# Patient Record
Sex: FEMALE | Race: WHITE | NOT HISPANIC OR LATINO | Employment: UNEMPLOYED | URBAN - METROPOLITAN AREA
[De-identification: names, ages, dates, MRNs, and addresses within clinical notes are randomized per-mention and may not be internally consistent; named-entity substitution may affect disease eponyms.]

---

## 2017-06-28 ENCOUNTER — HOSPITAL ENCOUNTER (OUTPATIENT)
Dept: MRI IMAGING | Facility: CLINIC | Age: 27
Discharge: HOME | End: 2017-06-28
Attending: PEDIATRICS | Admitting: PEDIATRICS
Payer: COMMERCIAL

## 2017-06-28 ENCOUNTER — OFFICE VISIT (OUTPATIENT)
Dept: PEDIATRIC HEMATOLOGY/ONCOLOGY | Facility: CLINIC | Age: 27
End: 2017-06-28
Attending: PEDIATRICS
Payer: COMMERCIAL

## 2017-06-28 VITALS
SYSTOLIC BLOOD PRESSURE: 107 MMHG | HEIGHT: 67 IN | TEMPERATURE: 97.5 F | DIASTOLIC BLOOD PRESSURE: 72 MMHG | WEIGHT: 186.95 LBS | RESPIRATION RATE: 20 BRPM | OXYGEN SATURATION: 100 % | BODY MASS INDEX: 29.34 KG/M2 | HEART RATE: 87 BPM

## 2017-06-28 DIAGNOSIS — D32.9 MENINGIOMA (H): Primary | ICD-10-CM

## 2017-06-28 DIAGNOSIS — D32.9 MENINGIOMA (H): ICD-10-CM

## 2017-06-28 PROCEDURE — 25000128 H RX IP 250 OP 636: Performed by: PEDIATRICS

## 2017-06-28 PROCEDURE — 99213 OFFICE O/P EST LOW 20 MIN: CPT | Mod: ZF

## 2017-06-28 PROCEDURE — 70553 MRI BRAIN STEM W/O & W/DYE: CPT

## 2017-06-28 PROCEDURE — A9585 GADOBUTROL INJECTION: HCPCS | Performed by: PEDIATRICS

## 2017-06-28 RX ORDER — GADOBUTROL 604.72 MG/ML
10 INJECTION INTRAVENOUS ONCE
Status: COMPLETED | OUTPATIENT
Start: 2017-06-28 | End: 2017-06-28

## 2017-06-28 RX ADMIN — GADOBUTROL 9 ML: 604.72 INJECTION INTRAVENOUS at 08:48

## 2017-06-28 ASSESSMENT — ENCOUNTER SYMPTOMS
HEMATOLOGIC/LYMPHATIC NEGATIVE: 1
MUSCULOSKELETAL NEGATIVE: 1
HEADACHES: 1
CARDIOVASCULAR NEGATIVE: 1
ALLERGIC/IMMUNOLOGIC NEGATIVE: 1
PSYCHIATRIC NEGATIVE: 1
CONSTITUTIONAL NEGATIVE: 1
ENDOCRINE NEGATIVE: 1
RESPIRATORY NEGATIVE: 1
DIARRHEA: 1
EYES NEGATIVE: 1

## 2017-06-28 ASSESSMENT — PAIN SCALES - GENERAL: PAINLEVEL: NO PAIN (1)

## 2017-06-28 NOTE — PROGRESS NOTES
Pt had MR brain performed this morning before Oncology appointment. After MR brain, shunt was interrogated with . Found to be 0.5 According to previous notes, patient's shunt valve should be at 2.0. Strata valve was reprogrammed to 2.0. Checked x3.     Kei Acosta, Neurosurgery, PGY-3  Please contact the neurosurgery resident on call with questions by dialing * * [phone], then entering [phone] prompted

## 2017-06-28 NOTE — LETTER
"  6/28/2017      RE: Mita Higgins  [address]   [facility] [address]          Pediatric Hematology/Oncology Clinic Note     HPI-  Mita Higgins is a 26 year old female with bilateral petrosphenoclival meningioma who presents to the clinic with mengioma for a follow up and review of MR brain results. She is s/p proton beam radiotherapy. Denies hip or other extremity pain. She is here for routine f/u - \"I'd rather be safe than sorry\".    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    History was obtained from Mita and medical record.       Allergies   Allergen Reactions     Nka [No Known Allergies]      Nkda [No Known Drug Allergies]      Seasonal Allergies        Current Outpatient Prescriptions   Medication     HYDROmorphone (DILAUDID) 2 MG tablet     acetaminophen (TYLENOL) 325 MG tablet     OMEPRAZOLE PO     cholecalciferol (VITAMIN D) 1000 UNIT tablet     atomoxetine (STRATTERA) 40 MG capsule     No current facility-administered medications for this visit.        Past Medical History:   Diagnosis Date     ADHD (attention deficit hyperactivity disorder)      Brain tumor (H)      Gastro-oesophageal reflux disease      Meningioma (H)      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)      S/P  shunt        Past Surgical History:   Procedure Laterality Date     ARTHROPLASTY HIP  7/30/2013    Procedure: ARTHROPLASTY HIP;  Left Total Hip Replacement ;  Surgeon: Oscar Peña MD;  Location:  OR     " "ARTHROPLASTY HIP Right 12/19/2014    Procedure: ARTHROPLASTY HIP;  Surgeon: Oscar Peña MD;  [facility]     BIOPSY  10/2010    brain tumor     ENT SURGERY      T & A     IMPLANT SHUNT VENTRICULOPERITONEAL  4/5/2012    Procedure:IMPLANT SHUNT VENTRICULOPERITONEAL; Left Ventriculo-Peritoneal Shunt Placement with Stealth Axiom; Surgeon:DWIGHT HOPKINS; Location:[facility]     OPTICAL TRACKING SYSTEM CRANIOTOMY, EXCISE TUMOR, COMBINED  4/12/2012    Procedure:COMBINED OPTICAL TRACKING SYSTEM CRANIOTOMY, EXCISE TUMOR; Stealth Assisted Suboccipital Craniotomy and Supracerabellar Infratentorial Approach to Brain Tumor; Surgeon:KEN VAUGHN; [facility]       No family history on file.    Review of Systems   Constitutional: Negative.    Eyes: Negative.    Respiratory: Negative.    Cardiovascular: Negative.    Gastrointestinal: Positive for diarrhea (With certain foods (greasy)).   Endocrine: Negative.    Genitourinary: Negative.    Musculoskeletal: Negative.    Skin: Negative.    Allergic/Immunologic: Negative.    Neurological: Positive for headaches.   Hematological: Negative.    Psychiatric/Behavioral: Negative.           /72 (BP Location: Right arm, Patient Position: Fowlers, Cuff Size: Adult Large)  Pulse 87  Temp 97.5  F (36.4  C) (Oral)  Resp 20  Ht 1.698 m (5' 6.85\")  Wt 84.8 kg (186 lb 15.2 oz)  SpO2 100%  BMI 29.41 kg/m2    Physical Exam   Constitutional: She is oriented to person, place, and time and well-developed, well-nourished, and in no distress.   HENT:   Head: Normocephalic.   Right Ear: External ear normal.   Left Ear: External ear normal.   Mouth/Throat: Oropharynx is clear and moist.   Eyes: Conjunctivae and EOM are normal. Pupils are equal, round, and reactive to light.   Neck: Normal range of motion. Neck supple.   Cardiovascular: Normal rate and regular rhythm.    Pulmonary/Chest: Breath sounds normal. She is in respiratory distress.   Abdominal: Soft. Bowel sounds " are normal. She exhibits no distension. There is no tenderness.   Musculoskeletal: Normal range of motion.   Neurological: She is alert and oriented to person, place, and time. She has normal reflexes. No cranial nerve deficit. Gait normal. GCS score is 15.   Skin: Skin is warm and dry. No erythema.   Psychiatric: Mood and affect normal.         Results for orders placed or performed in visit on 12/07/16   MRI Brain with & without gadolinium [TOW212]    Narrative     MR BRAIN W/O & W CONTRAST 12/7/2016 4:01 PM    History: Presence of cerebrospinal fluid drainage device, Benign  neoplasm of meninges, unspecified.    Comparison: 6/28/2016.    Technique: Multiplanar T1-weighted, axial FLAIR, and susceptibility  images were obtained without intravenous contrast. Following  intravenous gadolinium-based contrast administration, axial  T2-weighted, diffusion, and T1-weighted images (in multiple planes)  were obtained.    Contrast: 10 cc Gadavist IV    Findings:  Left frontal approach ventriculostomy catheter with tip of the foramen  of Yepez, unchanged. Susceptibility effect associated with the valve  reservoir. No change in size and configuration of the nonenlarged  ventricles. Extra-axial enhancing dural based mass extending along the  dorsal aspect of the clivus and sphenoid bone, also extending into  Meckel's cave bilaterally and surrounding the lateral and posterior  margins of both cavernous sinuses with contiguous dural thickening and  enhancement extending into the bilateral IACs, left greater than  right. There is also dural thickening and enhancement extending into  the sella and along the tentorium bilaterally. The mass nearly  completely surrounds the basilar artery with maintained flow void on  T2-weighted images. The flow voids of both cavernous internal carotid  arteries are also maintained. Associated mass effect on the lower  midbrain and upper jessica, unchanged. No change in size of this mass  since  6/28/2016. The mass is also similar in size when compared to  3/13/2015. There is less avid enhancement of the right parasagittal  portion of the tumor in the prepontine cistern, unchanged. There is  additional contiguous enhancing extra-axial mass with dural thickening  along the anterior and lateral aspects of the left middle cranial  fossa, also extending into the inferolateral aspect of the left  anterior cranial fossa. Unchanged associated local mass effect. No new  abnormal enhancement.    No parenchymal diffusion restriction. Scattered punctate foci of  susceptibility effect along the tentorium, unchanged. Mild paranasal  sinus mucosal thickening.      Impression    Impression:  1. No change in extent of meningioma, the bulk of which is in the  prepontine cistern.  2. Unchanged meningioma over the left temporoparietal region.  3. Unchanged left frontal approach ventriculostomy catheter with  stable size and configuration of the ventricles. No hydrocephalus.    JUAN SMITH MD       Comparison MRI today is stable.      1. History of meningioma   2. MR Brain - stable    Impression:  1. No progression      Plan:  1. RTC 2 years with MRI  2. Shunt reset today per neurosurgery    Time spent with patient 25 minutes.    This document serves as a record of the services and decisions personally performed and made by Pilo Barrett MD. It was created on his behalf by Carol Ann Luke, a trained medical scribe. The creation of this document is based on the provider's statements to the medical scribe.    The documentation recorded by the scribe accurately reflects the services I personally performed and the decisions made by me.      Pilo Barrett    CC  Patient Care Team:  Mitchel Aldridge as PCP - General  Susan Short MSW as  (Pediatric Hematology/Oncology)  Dread Smith MD as MD (Neurosurgery)  Oscar Peña MD as MD (Orthopedics)      Copy to patient  ANA CHASE  TONI  [address] [place] [address]   [facility] [address]

## 2017-06-28 NOTE — PROGRESS NOTES
"   Pediatric Hematology/Oncology Clinic Note     HPI-  Mita Higgins is a 26 year old female with bilateral petrosphenoclival meningioma who presents to the clinic with mengioma for a follow up and review of MR brain results. She is s/p proton beam radiotherapy. Denies hip or other extremity pain. She is here for routine f/u - \"I'd rather be safe than sorry\".    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    History was obtained from Mita and medical record.       Allergies   Allergen Reactions     Nka [No Known Allergies]      Nkda [No Known Drug Allergies]      Seasonal Allergies        Current Outpatient Prescriptions   Medication     HYDROmorphone (DILAUDID) 2 MG tablet     acetaminophen (TYLENOL) 325 MG tablet     OMEPRAZOLE PO     cholecalciferol (VITAMIN D) 1000 UNIT tablet     atomoxetine (STRATTERA) 40 MG capsule     No current facility-administered medications for this visit.        Past Medical History:   Diagnosis Date     ADHD (attention deficit hyperactivity disorder)      Brain tumor (H)      Gastro-oesophageal reflux disease      Meningioma (H)      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)      S/P  shunt        Past Surgical History:   Procedure Laterality Date     ARTHROPLASTY HIP  7/30/2013    Procedure: ARTHROPLASTY HIP;  Left Total Hip Replacement ;  Surgeon: Oscar Peña MD;  [facility]     ARTHROPLASTY HIP Right 12/19/2014    Procedure: ARTHROPLASTY HIP;  Surgeon: Oscar Peña, " "MD;  [facility]     BIOPSY  10/2010    brain tumor     ENT SURGERY      T & A     IMPLANT SHUNT VENTRICULOPERITONEAL  4/5/2012    Procedure:IMPLANT SHUNT VENTRICULOPERITONEAL; Left Ventriculo-Peritoneal Shunt Placement with Stealth Axiom; Surgeon:DWIGHT HOPKINS; [facility]     OPTICAL TRACKING SYSTEM CRANIOTOMY, EXCISE TUMOR, COMBINED  4/12/2012    Procedure:COMBINED OPTICAL TRACKING SYSTEM CRANIOTOMY, EXCISE TUMOR; Stealth Assisted Suboccipital Craniotomy and Supracerabellar Infratentorial Approach to Brain Tumor; Surgeon:KEN VAUGHN; Location[facility]       No family history on file.    Review of Systems   Constitutional: Negative.    Eyes: Negative.    Respiratory: Negative.    Cardiovascular: Negative.    Gastrointestinal: Positive for diarrhea (With certain foods (greasy)).   Endocrine: Negative.    Genitourinary: Negative.    Musculoskeletal: Negative.    Skin: Negative.    Allergic/Immunologic: Negative.    Neurological: Positive for headaches.   Hematological: Negative.    Psychiatric/Behavioral: Negative.           /72 (BP Location: Right arm, Patient Position: Fowlers, Cuff Size: Adult Large)  Pulse 87  Temp 97.5  F (36.4  C) (Oral)  Resp 20  Ht 1.698 m (5' 6.85\")  Wt 84.8 kg (186 lb 15.2 oz)  SpO2 100%  BMI 29.41 kg/m2    Physical Exam   Constitutional: She is oriented to person, place, and time and well-developed, well-nourished, and in no distress.   HENT:   Head: Normocephalic.   Right Ear: External ear normal.   Left Ear: External ear normal.   Mouth/Throat: Oropharynx is clear and moist.   Eyes: Conjunctivae and EOM are normal. Pupils are equal, round, and reactive to light.   Neck: Normal range of motion. Neck supple.   Cardiovascular: Normal rate and regular rhythm.    Pulmonary/Chest: Breath sounds normal. She is in respiratory distress.   Abdominal: Soft. Bowel sounds are normal. She exhibits no distension. There is no tenderness.   Musculoskeletal: Normal " range of motion.   Neurological: She is alert and oriented to person, place, and time. She has normal reflexes. No cranial nerve deficit. Gait normal. GCS score is 15.   Skin: Skin is warm and dry. No erythema.   Psychiatric: Mood and affect normal.         Results for orders placed or performed in visit on 12/07/16   MRI Brain with & without gadolinium [INE008]    Narrative     MR BRAIN W/O & W CONTRAST 12/7/2016 4:01 PM    History: Presence of cerebrospinal fluid drainage device, Benign  neoplasm of meninges, unspecified.    Comparison: 6/28/2016.    Technique: Multiplanar T1-weighted, axial FLAIR, and susceptibility  images were obtained without intravenous contrast. Following  intravenous gadolinium-based contrast administration, axial  T2-weighted, diffusion, and T1-weighted images (in multiple planes)  were obtained.    Contrast: 10 cc Gadavist IV    Findings:  Left frontal approach ventriculostomy catheter with tip of the foramen  of Yepez, unchanged. Susceptibility effect associated with the valve  reservoir. No change in size and configuration of the nonenlarged  ventricles. Extra-axial enhancing dural based mass extending along the  dorsal aspect of the clivus and sphenoid bone, also extending into  Meckel's cave bilaterally and surrounding the lateral and posterior  margins of both cavernous sinuses with contiguous dural thickening and  enhancement extending into the bilateral IACs, left greater than  right. There is also dural thickening and enhancement extending into  the sella and along the tentorium bilaterally. The mass nearly  completely surrounds the basilar artery with maintained flow void on  T2-weighted images. The flow voids of both cavernous internal carotid  arteries are also maintained. Associated mass effect on the lower  midbrain and upper jessica, unchanged. No change in size of this mass  since 6/28/2016. The mass is also similar in size when compared to  3/13/2015. There is less avid  enhancement of the right parasagittal  portion of the tumor in the prepontine cistern, unchanged. There is  additional contiguous enhancing extra-axial mass with dural thickening  along the anterior and lateral aspects of the left middle cranial  fossa, also extending into the inferolateral aspect of the left  anterior cranial fossa. Unchanged associated local mass effect. No new  abnormal enhancement.    No parenchymal diffusion restriction. Scattered punctate foci of  susceptibility effect along the tentorium, unchanged. Mild paranasal  sinus mucosal thickening.      Impression    Impression:  1. No change in extent of meningioma, the bulk of which is in the  prepontine cistern.  2. Unchanged meningioma over the left temporoparietal region.  3. Unchanged left frontal approach ventriculostomy catheter with  stable size and configuration of the ventricles. No hydrocephalus.    JUAN SMITH MD       Comparison MRI today is stable.      1. History of meningioma   2. MR Brain - stable    Impression:  1. No progression      Plan:  1. RTC 2 years with MRI  2. Shunt reset today per neurosurgery    Time spent with patient 25 minutes.    This document serves as a record of the services and decisions personally performed and made by Pilo Barrett MD. It was created on his behalf by Carol Ann Luke, a trained medical scribe. The creation of this document is based on the provider's statements to the medical scribe.    The documentation recorded by the scribe accurately reflects the services I personally performed and the decisions made by me.      Pilo Barrett      Patient Care Team:  Mitchel Aldridge as PCP - General  Susan Short MSW as  (Pediatric Hematology/Oncology)  Dread Smith MD as MD (Neurosurgery)  Oscar Peña MD as MD (Orthopedics)  MITCHEL ALDRIDGE    Copy to patient  ANA STONE [address]  [place] [address]

## 2017-06-28 NOTE — NURSING NOTE
"Chief Complaint   Patient presents with     RECHECK     Patient here today for follow up with Meningioma (H)     /72 (BP Location: Right arm, Patient Position: Fowlers, Cuff Size: Adult Large)  Pulse 87  Temp 97.5  F (36.4  C) (Oral)  Resp 20  Ht 1.698 m (5' 6.85\")  Wt 84.8 kg (186 lb 15.2 oz)  SpO2 100%  BMI 29.41 kg/m2  Carmel Meza M.A  June 28, 2017    "

## 2017-06-28 NOTE — MR AVS SNAPSHOT
After Visit Summary   6/28/2017    Mita Higgins    MRN: [id number]           Patient Information     Date Of Birth          1990        Visit Information        Provider Department      6/28/2017 10:00 AM Pilo Barrett MD Peds Hematology Oncology        Today's Diagnoses     Meningioma (H)    -  1          [facility], 9th floor  [address] [place] [address]  Phone: [phone]  Clinic Hours:   Monday-Friday:   7 am to 5:00 pm   closed weekends and major  holidays     If your fever is 100.5  or greater,   call the clinic during business hours.   After hours call [phone] and ask for the pediatric hematology / oncology physician to be paged for you.               Follow-ups after your visit        Follow-up notes from your care team     Return for CNS Tumor follow-up, Scan Results.      Who to contact     Please call your clinic at [phone] to:    Ask questions about your health    Make or cancel appointments    Discuss your medicines    Learn about your test results    Speak to your doctor   If you have compliments or concerns about an experience at your clinic, or if you wish to file a complaint, please contact [facility] Patient Relations at [phone] or email us at [email or web address]         Additional Information About Your Visit        MyChart Information     iQiyi is an electronic gateway that provides easy, online access to your medical records. With iQiyi, you can request a clinic appointment, read your test results, renew a prescription or communicate with your care team.     To sign up for ByAllAccountst visit the website at [email or web address]   You will be asked to enter the access code listed below, as well as some personal information. Please follow the directions to create your username and password.     Your access code is: [email or web address]  Expires:  "2017  2:41 PM     Your access code will  in 90 days. If you need help or a new code, please contact your [facility] or call [phone] for assistance.        Care EveryWhere ID     This is your Care EveryWhere ID. This could be used by other organizations to access your [facility] medical records  [id number]        Your Vitals Were     Pulse Temperature Respirations Height Pulse Oximetry BMI (Body Mass Index)    87 97.5  F (36.4  C) (Oral) 20 1.698 m (5' 6.85\") 100% 29.41 kg/m2       Blood Pressure from Last 3 Encounters:   17 107/72   16 118/66   03/13/15 107/78    Weight from Last 3 Encounters:   17 84.8 kg (186 lb 15.2 oz)   16 92.2 kg (203 lb 3.2 oz)   03/13/15 87.3 kg (192 lb 7.4 oz)              Today, you had the following     No orders found for display       Primary Care Provider Office Phone # Fax #    Mitchel Aldridge [phone]       [address] [address]        Equal Access to Services     SAMUEL BROWN AH: Hadii suzan ku hadasho Soomaali, waaxda luqadaha, qaybta kaalmada adeegyada, barrie leonin hayramon mcgraw. So [facility] [phone].    ATENCIÓN: Si habla español, tiene a floyd disposición servicios gratuitos de asistencia lingüística. Llame al [phone].    We comply with applicable federal civil rights laws and [place] laws. We do not discriminate on the basis of race, color, national origin, age, disability sex, sexual orientation or gender identity.            Thank you!     Thank you for choosing PEDS HEMATOLOGY ONCOLOGY  for your care. Our goal is always to provide you with excellent care. Hearing back from our patients is one way we can continue to improve our services. Please take a few minutes to complete the written survey that you may receive in the mail after your visit with us. Thank you!             Your Updated Medication List - Protect others " around you: Learn how to safely use, store and throw away your medicines at www.disposemymeds.org.          This list is accurate as of: 6/28/17  2:41 PM.  Always use your most recent med list.                   Brand Name Dispense Instructions for use Diagnosis    acetaminophen 325 MG tablet    TYLENOL    100 tablet    Take 2 tablets (650 mg) by mouth every 6 hours    S/P total hip arthroplasty       cholecalciferol 1000 UNIT tablet    vitamin D    30 tablet    Take 2 tablets by mouth daily.    Meningioma (H)       ranitidine 150 MG tablet    ZANTAC    60 tablet    Take by mouth 2 times daily    Meningioma (H)       STRATTERA 40 MG capsule   Generic drug:  atomoxetine      Take 1 capsule by mouth daily Hold DOS 12-19

## 2017-06-28 NOTE — LETTER
"  6/28/2017      RE: Mita Higgins  [address]   [facility] [address]          Pediatric Hematology/Oncology Clinic Note     HPI-  Mita Higgins is a 26 year old female with bilateral petrosphenoclival meningioma who presents to the clinic with mengioma for a follow up and review of MR brain results. She is s/p proton beam radiotherapy. Denies hip or other extremity pain. She is here for routine f/u - \"I'd rather be safe than sorry\".    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    History was obtained from Mita and medical record.       Allergies   Allergen Reactions     Nka [No Known Allergies]      Nkda [No Known Drug Allergies]      Seasonal Allergies        Current Outpatient Prescriptions   Medication     HYDROmorphone (DILAUDID) 2 MG tablet     acetaminophen (TYLENOL) 325 MG tablet     OMEPRAZOLE PO     cholecalciferol (VITAMIN D) 1000 UNIT tablet     atomoxetine (STRATTERA) 40 MG capsule     No current facility-administered medications for this visit.        Past Medical History:   Diagnosis Date     ADHD (attention deficit hyperactivity disorder)      Brain tumor (H)      Gastro-oesophageal reflux disease      Meningioma (H)      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)      S/P  shunt        Past Surgical History:   Procedure Laterality Date     ARTHROPLASTY HIP  7/30/2013    Procedure: ARTHROPLASTY HIP;  Left Total Hip Replacement ;  Surgeon: Oscar Peña MD;  Location:  OR     " "ARTHROPLASTY HIP Right 12/19/2014    Procedure: ARTHROPLASTY HIP;  Surgeon: Oscar Peña MD;  Location: [facility]     BIOPSY  10/2010    brain tumor     ENT SURGERY      T & A     IMPLANT SHUNT VENTRICULOPERITONEAL  4/5/2012    Procedure:IMPLANT SHUNT VENTRICULOPERITONEAL; Left Ventriculo-Peritoneal Shunt Placement with Stealth Axiom; Surgeon:DWIGHT HOPKINS; Location:[facility]     OPTICAL TRACKING SYSTEM CRANIOTOMY, EXCISE TUMOR, COMBINED  4/12/2012    Procedure:COMBINED OPTICAL TRACKING SYSTEM CRANIOTOMY, EXCISE TUMOR; Stealth Assisted Suboccipital Craniotomy and Supracerabellar Infratentorial Approach to Brain Tumor; Surgeon:KEN VAUGHN; Location:[facility]       No family history on file.    Review of Systems   Constitutional: Negative.    Eyes: Negative.    Respiratory: Negative.    Cardiovascular: Negative.    Gastrointestinal: Positive for diarrhea (With certain foods (greasy)).   Endocrine: Negative.    Genitourinary: Negative.    Musculoskeletal: Negative.    Skin: Negative.    Allergic/Immunologic: Negative.    Neurological: Positive for headaches.   Hematological: Negative.    Psychiatric/Behavioral: Negative.           /72 (BP Location: Right arm, Patient Position: Fowlers, Cuff Size: Adult Large)  Pulse 87  Temp 97.5  F (36.4  C) (Oral)  Resp 20  Ht 1.698 m (5' 6.85\")  Wt 84.8 kg (186 lb 15.2 oz)  SpO2 100%  BMI 29.41 kg/m2    Physical Exam   Constitutional: She is oriented to person, place, and time and well-developed, well-nourished, and in no distress.   HENT:   Head: Normocephalic.   Right Ear: External ear normal.   Left Ear: External ear normal.   Mouth/Throat: Oropharynx is clear and moist.   Eyes: Conjunctivae and EOM are normal. Pupils are equal, round, and reactive to light.   Neck: Normal range of motion. Neck supple.   Cardiovascular: Normal rate and regular rhythm.    Pulmonary/Chest: Breath sounds normal. She is in respiratory distress.   Abdominal: Soft. Bowel sounds " are normal. She exhibits no distension. There is no tenderness.   Musculoskeletal: Normal range of motion.   Neurological: She is alert and oriented to person, place, and time. She has normal reflexes. No cranial nerve deficit. Gait normal. GCS score is 15.   Skin: Skin is warm and dry. No erythema.   Psychiatric: Mood and affect normal.         Results for orders placed or performed in visit on 12/07/16   MRI Brain with & without gadolinium [[id number]]    Narrative     MR BRAIN W/O & W CONTRAST 12/7/2016 4:01 PM    History: Presence of cerebrospinal fluid drainage device, Benign  neoplasm of meninges, unspecified.    Comparison: 6/28/2016.    Technique: Multiplanar T1-weighted, axial FLAIR, and susceptibility  images were obtained without intravenous contrast. Following  intravenous gadolinium-based contrast administration, axial  T2-weighted, diffusion, and T1-weighted images (in multiple planes)  were obtained.    Contrast: 10 cc Gadavist IV    Findings:  Left frontal approach ventriculostomy catheter with tip of the foramen  of Yepez, unchanged. Susceptibility effect associated with the valve  reservoir. No change in size and configuration of the nonenlarged  ventricles. Extra-axial enhancing dural based mass extending along the  dorsal aspect of the clivus and sphenoid bone, also extending into  Meckel's cave bilaterally and surrounding the lateral and posterior  margins of both cavernous sinuses with contiguous dural thickening and  enhancement extending into the bilateral IACs, left greater than  right. There is also dural thickening and enhancement extending into  the sella and along the tentorium bilaterally. The mass nearly  completely surrounds the basilar artery with maintained flow void on  T2-weighted images. The flow voids of both cavernous internal carotid  arteries are also maintained. Associated mass effect on the lower  midbrain and upper jessica, unchanged. No change in size of this mass  since  6/28/2016. The mass is also similar in size when compared to  3/13/2015. There is less avid enhancement of the right parasagittal  portion of the tumor in the prepontine cistern, unchanged. There is  additional contiguous enhancing extra-axial mass with dural thickening  along the anterior and lateral aspects of the left middle cranial  fossa, also extending into the inferolateral aspect of the left  anterior cranial fossa. Unchanged associated local mass effect. No new  abnormal enhancement.    No parenchymal diffusion restriction. Scattered punctate foci of  susceptibility effect along the tentorium, unchanged. Mild paranasal  sinus mucosal thickening.      Impression    Impression:  1. No change in extent of meningioma, the bulk of which is in the  prepontine cistern.  2. Unchanged meningioma over the left temporoparietal region.  3. Unchanged left frontal approach ventriculostomy catheter with  stable size and configuration of the ventricles. No hydrocephalus.    JUAN SMITH MD       Comparison MRI today is stable.      1. History of meningioma   2. MR Brain - stable    Impression:  1. No progression      Plan:  1. RTC 2 years with MRI  2. Shunt reset today per neurosurgery    Time spent with patient 25 minutes.    This document serves as a record of the services and decisions personally performed and made by Pilo Barrett MD. It was created on his behalf by Carol Ann Luke, a trained medical scribe. The creation of this document is based on the provider's statements to the medical scribe.    The documentation recorded by the scribe accurately reflects the services I personally performed and the decisions made by me.      Pilo Barrett      Patient Care Team:  Mitchel Aldridge as PCP - General  Susan Short MSW as  (Pediatric Hematology/Oncology)  Dread Smith MD as MD (Neurosurgery)  Oscar Peña MD as MD (Orthopedics)  MITCHEL ALDRIDGE    Copy to  patient  ANA RICHARDS[address]   [facility] [address]      Pilo Barrett MD

## 2018-11-19 DIAGNOSIS — D32.9 MENINGIOMA (H): Primary | ICD-10-CM

## 2018-12-04 ENCOUNTER — HOSPITAL ENCOUNTER (OUTPATIENT)
Dept: MRI IMAGING | Facility: CLINIC | Age: 28
Discharge: HOME | End: 2018-12-04
Attending: PEDIATRICS | Admitting: PEDIATRICS
Payer: COMMERCIAL

## 2018-12-04 ENCOUNTER — OFFICE VISIT (OUTPATIENT)
Dept: PEDIATRIC HEMATOLOGY/ONCOLOGY | Facility: CLINIC | Age: 28
End: 2018-12-04
Attending: NURSE PRACTITIONER
Payer: COMMERCIAL

## 2018-12-04 VITALS
HEIGHT: 67 IN | BODY MASS INDEX: 30.62 KG/M2 | HEART RATE: 79 BPM | RESPIRATION RATE: 24 BRPM | TEMPERATURE: 98 F | OXYGEN SATURATION: 100 % | WEIGHT: 195.11 LBS | SYSTOLIC BLOOD PRESSURE: 110 MMHG | DIASTOLIC BLOOD PRESSURE: 94 MMHG

## 2018-12-04 DIAGNOSIS — D32.9 MENINGIOMA (H): Primary | ICD-10-CM

## 2018-12-04 DIAGNOSIS — G44.209 TENSION HEADACHE: ICD-10-CM

## 2018-12-04 DIAGNOSIS — D32.9 MENINGIOMA (H): ICD-10-CM

## 2018-12-04 PROCEDURE — A9585 GADOBUTROL INJECTION: HCPCS | Performed by: PEDIATRICS

## 2018-12-04 PROCEDURE — G0463 HOSPITAL OUTPT CLINIC VISIT: HCPCS | Mod: ZF,25

## 2018-12-04 PROCEDURE — 25500064 ZZH RX 255 OP 636: Performed by: PEDIATRICS

## 2018-12-04 PROCEDURE — 70553 MRI BRAIN STEM W/O & W/DYE: CPT

## 2018-12-04 RX ORDER — MEDROXYPROGESTERONE ACETATE 10 MG
10 TABLET ORAL
Status: ON HOLD | COMMUNITY
Start: 2018-11-16 | End: 2021-09-11

## 2018-12-04 RX ORDER — GADOBUTROL 604.72 MG/ML
7.5 INJECTION INTRAVENOUS ONCE
Status: COMPLETED | OUTPATIENT
Start: 2018-12-04 | End: 2018-12-04

## 2018-12-04 RX ADMIN — GADOBUTROL 7.5 ML: 604.72 INJECTION INTRAVENOUS at 12:08

## 2018-12-04 ASSESSMENT — PAIN SCALES - GENERAL: PAINLEVEL: NO PAIN (0)

## 2018-12-04 ASSESSMENT — ENCOUNTER SYMPTOMS
RESPIRATORY NEGATIVE: 1
DIARRHEA: 1
MUSCULOSKELETAL NEGATIVE: 1
ALLERGIC/IMMUNOLOGIC NEGATIVE: 1
HEADACHES: 1
HEMATOLOGIC/LYMPHATIC NEGATIVE: 1
ENDOCRINE NEGATIVE: 1
EYES NEGATIVE: 1
CONSTITUTIONAL NEGATIVE: 1
PSYCHIATRIC NEGATIVE: 1
CARDIOVASCULAR NEGATIVE: 1

## 2018-12-04 NOTE — NURSING NOTE
"Chief Complaint   Patient presents with     RECHECK     Patient is here today for Meningioma follow up     BP (!) 110/94 (BP Location: Right arm, Patient Position: Fowlers, Cuff Size: Adult Large)  Pulse 79  Temp 98  F (36.7  C) (Oral)  Resp 24  Ht 1.698 m (5' 6.85\")  Wt 88.5 kg (195 lb 1.7 oz)  SpO2 100%  BMI 30.7 kg/m2    Jannette Miller LPN  December 4, 2018  "

## 2018-12-04 NOTE — LETTER
12/4/2018      RE: Mita Higgins  [address]  [facility] [address]          Pediatric Hematology/Oncology Clinic Note     CC:  Mita Higgins is a 28 year old female with bilateral petrosphenoclival meningioma who presents to the clinic with mengioma for a review of MR brain results which was ordered for difficult symptomatology.    HPI:. Denies hip or other extremity pain.She spoke with Dr. Barrett regarding increasing headaches which started about 3 weeks ago. They began to happen intermittently since her travel in late August but recently are more continuous. They start on the right side and then cross toward the shunt.   No nausea or vomiting with the headaches.  Increased headache issues when she is hot.  Voiding okay.  The last three days she has had diarrhea.  No fevers.  She started her period on the 17th for the first time in years.  She was started on Provera on the 15th.  Still is changing her menstrual pads today.        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    History was obtained from Mita and medical record.       Allergies   Allergen Reactions     Nka [No Known Allergies]      Nkda [No Known Drug Allergies]      Seasonal Allergies        Current Outpatient Prescriptions   Medication     acetaminophen (TYLENOL) 325 MG tablet     atomoxetine (STRATTERA) 40 MG capsule     medroxyPROGESTERone (PROVERA) 10 MG tablet     ranitidine (ZANTAC) 150 MG tablet     cholecalciferol (VITAMIN D) 1000 UNIT tablet     No current facility-administered medications for this visit.        Past Medical History:   Diagnosis Date     ADHD (attention deficit hyperactivity  "disorder)      Brain tumor (H)      Gastro-oesophageal reflux disease      Meningioma (H)      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)      S/P  shunt        Past Surgical History:   Procedure Laterality Date     ARTHROPLASTY HIP  7/30/2013    Procedure: ARTHROPLASTY HIP;  Left Total Hip Replacement ;  Surgeon: Oscar Peña MD;  [facility]     ARTHROPLASTY HIP Right 12/19/2014    Procedure: ARTHROPLASTY HIP;  Surgeon: Oscar Peña MD;  [facility]: [facility]     BIOPSY  10/2010    brain tumor     ENT SURGERY      T & A     IMPLANT SHUNT VENTRICULOPERITONEAL  4/5/2012    Procedure:IMPLANT SHUNT VENTRICULOPERITONEAL; Left Ventriculo-Peritoneal Shunt Placement with Stealth Axiom; Surgeon:DWIGHT HOPKINS; Location[facility]     OPTICAL TRACKING SYSTEM CRANIOTOMY, EXCISE TUMOR, COMBINED  4/12/2012    Procedure:COMBINED OPTICAL TRACKING SYSTEM CRANIOTOMY, EXCISE TUMOR; Stealth Assisted Suboccipital Craniotomy and Supracerabellar Infratentorial Approach to Brain Tumor; Surgeon:KEN VAUGHN; Location:[facility]   She is s/p proton beam radiotherapy.     No family history on file.    Review of Systems   Constitutional: Negative.    Eyes: Negative.    Respiratory: Negative.    Cardiovascular: Negative.    Gastrointestinal: Positive for diarrhea (With certain foods (greasy).  She has also had some the last several days. ).   Endocrine: Negative.    Genitourinary: Negative.    Musculoskeletal: Negative.    Skin: Negative.    Allergic/Immunologic: Negative.    Neurological: Positive for headaches.   Hematological: Negative.    Psychiatric/Behavioral: Negative.         BP (!) 110/94 (BP Location: Right arm, Patient Position: Fowlers, Cuff Size: Adult Large)  Pulse 79  Temp 98  F (36.7  C) (Oral)  Resp 24  Ht 1.698 m (5' 6.85\")  Wt 88.5 kg (195 lb 1.7 oz)  SpO2 100%  BMI 30.7 kg/m2    Physical Exam   Constitutional: She is oriented to person, place, and time and well-developed, well-nourished, and in no distress. "   HENT:   Head: Normocephalic.   Right Ear: External ear normal.   Left Ear: External ear normal.   Mouth/Throat: Oropharynx is clear and moist.   Eyes: Conjunctivae and EOM are normal. Pupils are equal, round, and reactive to light.   Neck: Normal range of motion. Neck supple.   Cardiovascular: Normal rate and regular rhythm.    Pulmonary/Chest: Breath sounds normal.   Abdominal: Soft. Bowel sounds are normal. She exhibits no distension. There is no tenderness.   Musculoskeletal: Normal range of motion.   Neurological: She is alert and oriented to person, place, and time. She has normal reflexes. No cranial nerve deficit. Gait normal. GCS score is 15.   Skin: Skin is warm and dry. No erythema.   Psychiatric: Mood and affect normal.       Imaging: MRI completed today    Impression:  1. History of meningioma   2. MR Brain - prelim results are that it is stable.  3. Headache - that resolved with shunt reprogramimg.     Plan:  1. Discussed preliminary results with radiology and they thought MRI was stable.  We will review the scans in tumor conference on Wednesday and will let them know when results have been finalized.    2. Shunt reset today per neurosurgery - Interestingly the headache improved with reprogramming.  It is possible that security measures or the flight itself could have spontaneously adjusted the shunt.    Addendum:  Results for orders placed or performed during the hospital encounter of 12/04/18   MR Brain w/o & w Contrast    Narrative     MR BRAIN W/O & W CONTRAST 12/4/2018 12:39 PM    Provided History: Pt has a  shunt. Strata Valve; Meningioma (H).  ICD-10: Meningioma (H)    Comparison: 6/28/2017, 12/7/2016.    Technique: Multiplanar T1-weighted, axial FLAIR, and susceptibility  images were obtained without intravenous contrast. Following  intravenous gadolinium-based contrast administration, axial  T2-weighted, diffusion, and T1-weighted images (in multiple planes)  were obtained.    Contrast:  7.5 mL of Gadavist    Findings:  Left frontal approach ventriculostomy catheter with tip at the level  of the left foramen of Yepez. Significant metal artifact from the  shunt reservoir over the left frontal convexity.    No substantial change in the dural based enhancing mass lesion  centered about the prepontine cistern with partial encasement of the  basilar artery. This lesion extends bilaterally into Meckel's cave,  the proximal aspects of the internal auditory canals, and the sella  turcica.    No substantial change in the dural based enhancing mass lesion  situated between the left frontal lobe, temporal lobe, and anterior  left temporal bone.    No substantial change in the dural based enhancing lesion overlying  the left frontal convexity.    Further increase in size of a right frontal convexity dural based  enhancing mass lesion measuring 8 x 11 mm (series 101 image 122)  previously 5 x 8 mm 6/28/2017 and 3 x 5 mm 6/28/2016.    There is no midline shift, or evidence of intracranial hemorrhage. The  ventricles are proportionate to the cerebral sulci.  No definite abnormality of the skull marrow signal is noted. The major  vascular intracranial flow-voids are present. The visualized portions  of paranasal sinuses, and mastoid air cells are relatively clear. The  orbits are grossly unremarkable.      Impression    Impression:  1. Further increase in size of the small right frontal convexity  meningioma measuring 8 x 11 mm (series 101 image 122) previously 5 x 8  mm 6/28/2017 and 3 x 5 mm 6/28/2016.  2. Unchanged meningiomas centered in the prepontine cistern, left  frontal cerebral convexity, and left temporo frontal area dating back  to 6/28/2016.  3. Stable appearance of the left frontal approach ventriculostomy  catheter.  4. T1 bright signal in the bilateral globus pallidus, question  gadolinium deposition.    I have personally reviewed the examination and initial interpretation  and I agree with the  findings.    MIRTHA WATSON MD     Discussed final MRI results with My and her mother.  It is concerning that she has multiple meningiomas and there is mild progression.  We will recheck MRI in 6 months and discuss testing for meningiomatosis. We will arrange visit with the genetic counselor.  This was explained and discussed with Mita and her mother.     LOUISE Zhang CNP

## 2018-12-04 NOTE — PROGRESS NOTES
Pediatric Hematology/Oncology Clinic Note     CC:  Mita Higgins is a 28 year old female with bilateral petrosphenoclival meningioma who presents to the clinic with mengioma for a review of MR brain results which was ordered for difficult symptomatology.    HPI:. Denies hip or other extremity pain.She spoke with Dr. Barrett regarding increasing headaches which started about 3 weeks ago. They began to happen intermittently since her travel in late August but recently are more continuous. They start on the right side and then cross toward the shunt.   No nausea or vomiting with the headaches.  Increased headache issues when she is hot.  Voiding okay.  The last three days she has had diarrhea.  No fevers.  She started her period on the 17th for the first time in years.  She was started on Provera on the 15th.  Still is changing her menstrual pads today.        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    History was obtained from Mita and medical record.       Allergies   Allergen Reactions     Nka [No Known Allergies]      Nkda [No Known Drug Allergies]      Seasonal Allergies        Current Outpatient Prescriptions   Medication     acetaminophen (TYLENOL) 325 MG tablet     atomoxetine (STRATTERA) 40 MG capsule     medroxyPROGESTERone (PROVERA) 10 MG tablet     ranitidine (ZANTAC) 150 MG tablet     cholecalciferol (VITAMIN D) 1000 UNIT tablet     No current facility-administered medications for this visit.        Past Medical History:   Diagnosis Date     ADHD (attention deficit hyperactivity disorder)      Brain tumor (H)      Gastro-oesophageal reflux disease      Meningioma (H)   "    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)      S/P  shunt        Past Surgical History:   Procedure Laterality Date     ARTHROPLASTY HIP  7/30/2013    Procedure: ARTHROPLASTY HIP;  Left Total Hip Replacement ;  Surgeon: Oscar Peña MD;  Location: [facility]     ARTHROPLASTY HIP Right 12/19/2014    Procedure: ARTHROPLASTY HIP;  Surgeon: Oscar Peña MD;  Location: [facility] OR     BIOPSY  10/2010    brain tumor     ENT SURGERY      T & A     IMPLANT SHUNT VENTRICULOPERITONEAL  4/5/2012    Procedure:IMPLANT SHUNT VENTRICULOPERITONEAL; Left Ventriculo-Peritoneal Shunt Placement with Stealth Axiom; Surgeon:DWIGHT OHPKINS; Location:[facility] OR     OPTICAL TRACKING SYSTEM CRANIOTOMY, EXCISE TUMOR, COMBINED  4/12/2012    Procedure:COMBINED OPTICAL TRACKING SYSTEM CRANIOTOMY, EXCISE TUMOR; Stealth Assisted Suboccipital Craniotomy and Supracerabellar Infratentorial Approach to Brain Tumor; Surgeon:KEN VAUGHN; Location:[facility] OR   She is s/p proton beam radiotherapy.     No family history on file.    Review of Systems   Constitutional: Negative.    Eyes: Negative.    Respiratory: Negative.    Cardiovascular: Negative.    Gastrointestinal: Positive for diarrhea (With certain foods (greasy).  She has also had some the last several days. ).   Endocrine: Negative.    Genitourinary: Negative.    Musculoskeletal: Negative.    Skin: Negative.    Allergic/Immunologic: Negative.    Neurological: Positive for headaches.   Hematological: Negative.    Psychiatric/Behavioral: Negative.         BP (!) 110/94 (BP Location: Right arm, Patient Position: Fowlers, Cuff Size: Adult Large)  Pulse 79  Temp 98  F (36.7  C) (Oral)  Resp 24  Ht 1.698 m (5' 6.85\")  Wt 88.5 kg (195 lb 1.7 oz)  SpO2 100%  BMI 30.7 kg/m2    Physical Exam   Constitutional: She is oriented to person, place, and time and well-developed, well-nourished, and in no distress.   HENT:   Head: Normocephalic.   Right Ear: External ear normal.   Left Ear: External ear normal. "   Mouth/Throat: Oropharynx is clear and moist.   Eyes: Conjunctivae and EOM are normal. Pupils are equal, round, and reactive to light.   Neck: Normal range of motion. Neck supple.   Cardiovascular: Normal rate and regular rhythm.    Pulmonary/Chest: Breath sounds normal.   Abdominal: Soft. Bowel sounds are normal. She exhibits no distension. There is no tenderness.   Musculoskeletal: Normal range of motion.   Neurological: She is alert and oriented to person, place, and time. She has normal reflexes. No cranial nerve deficit. Gait normal. GCS score is 15.   Skin: Skin is warm and dry. No erythema.   Psychiatric: Mood and affect normal.       Imaging: MRI completed today    Impression:  1. History of meningioma   2. MR Brain - prelim results are that it is stable.  3. Headache - that resolved with shunt reprogramimg.     Plan:  1. Discussed preliminary results with radiology and they thought MRI was stable.  We will review the scans in tumor conference on Wednesday and will let them know when results have been finalized.    2. Shunt reset today per neurosurgery - Interestingly the headache improved with reprogramming.  It is possible that security measures or the flight itself could have spontaneously adjusted the shunt.    Addendum:  Results for orders placed or performed during the hospital encounter of 12/04/18   MR Brain w/o & w Contrast    Narrative     MR BRAIN W/O & W CONTRAST 12/4/2018 12:39 PM    Provided History: Pt has a  shunt. Strata Valve; Meningioma (H).  ICD-10: Meningioma (H)    Comparison: 6/28/2017, 12/7/2016.    Technique: Multiplanar T1-weighted, axial FLAIR, and susceptibility  images were obtained without intravenous contrast. Following  intravenous gadolinium-based contrast administration, axial  T2-weighted, diffusion, and T1-weighted images (in multiple planes)  were obtained.    Contrast: 7.5 mL of Gadavist    Findings:  Left frontal approach ventriculostomy catheter with tip at the  level  of the left foramen of Yepez. Significant metal artifact from the  shunt reservoir over the left frontal convexity.    No substantial change in the dural based enhancing mass lesion  centered about the prepontine cistern with partial encasement of the  basilar artery. This lesion extends bilaterally into Meckel's cave,  the proximal aspects of the internal auditory canals, and the sella  turcica.    No substantial change in the dural based enhancing mass lesion  situated between the left frontal lobe, temporal lobe, and anterior  left temporal bone.    No substantial change in the dural based enhancing lesion overlying  the left frontal convexity.    Further increase in size of a right frontal convexity dural based  enhancing mass lesion measuring 8 x 11 mm (series 101 image 122)  previously 5 x 8 mm 6/28/2017 and 3 x 5 mm 6/28/2016.    There is no midline shift, or evidence of intracranial hemorrhage. The  ventricles are proportionate to the cerebral sulci.  No definite abnormality of the skull marrow signal is noted. The major  vascular intracranial flow-voids are present. The visualized portions  of paranasal sinuses, and mastoid air cells are relatively clear. The  orbits are grossly unremarkable.      Impression    Impression:  1. Further increase in size of the small right frontal convexity  meningioma measuring 8 x 11 mm (series 101 image 122) previously 5 x 8  mm 6/28/2017 and 3 x 5 mm 6/28/2016.  2. Unchanged meningiomas centered in the prepontine cistern, left  frontal cerebral convexity, and left temporo frontal area dating back  to 6/28/2016.  3. Stable appearance of the left frontal approach ventriculostomy  catheter.  4. T1 bright signal in the bilateral globus pallidus, question  gadolinium deposition.    I have personally reviewed the examination and initial interpretation  and I agree with the findings.    MIRTHA WATSON MD     Discussed final MRI results with My and her mother.  It  is concerning that she has multiple meningiomas and there is mild progression.  We will recheck MRI in 6 months and discuss testing for meningiomatosis. We will arrange visit with the genetic counselor.  This was explained and discussed with Mita and her mother.

## 2018-12-05 ENCOUNTER — TELEPHONE (OUTPATIENT)
Dept: PEDIATRIC HEMATOLOGY/ONCOLOGY | Facility: CLINIC | Age: 28
End: 2018-12-05

## 2018-12-05 DIAGNOSIS — D42.9 MENINGIOMA, MULTIPLE (H): Primary | ICD-10-CM

## 2018-12-05 DIAGNOSIS — Z98.2 S/P VP SHUNT: ICD-10-CM

## 2018-12-05 NOTE — TELEPHONE ENCOUNTER
"Called patient and her mother, per request by MICHELLE Rivas CNP, regarding updated MRI result:     \"Further increase in size of the small right frontal convexity meningioma measuring 8 x 11 mm (series 101 image 122) previously 5 x 8 mm 6/28/2017 and 3 x 5 mm 6/28/2016.\"    Informed patient and her mother that Mita's case was discussed in our team conference today; the team recommended repeating the MRI scan again in 6 months, or sooner if Mita's headaches come back, or if she develops any new symptoms. 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     Also explained to patient and mother that recent research has found genetic changes in the blood of some patients who develop multiple meningiomas or in families who have multiple members with meningiomas, so we could send a sample of Mita's blood to be tested for any of these genetic changes the next time she comes back to our clinic. We would also have Mita meet with a genetic counselor to learn more about the genetic testing process.    Reassured patient and mother that if Mita has one of these genetic changes, this would not make her meningiomas grow any faster. It would just help explain why she has developed so many of them, and if there is any risk of passing this change on to offspring, if Mita wanted to have children.     Advised patient and mother to let us know if headaches or new symptoms develop.  Patient and mother verbalized agreement with this plan.    Cristy Rios RN, MS            "

## 2018-12-06 ENCOUNTER — TELEPHONE (OUTPATIENT)
Dept: PEDIATRIC HEMATOLOGY/ONCOLOGY | Facility: CLINIC | Age: 28
End: 2018-12-06

## 2019-01-08 ENCOUNTER — TELEPHONE (OUTPATIENT)
Dept: PEDIATRIC HEMATOLOGY/ONCOLOGY | Facility: CLINIC | Age: 29
End: 2019-01-08

## 2019-01-08 DIAGNOSIS — D32.9 MENINGIOMA (H): Primary | ICD-10-CM

## 2019-01-08 NOTE — TELEPHONE ENCOUNTER
Discussion by phone with Mita today about increasing headaches.  She did not have trouble with headaches in early December after her shunt was reprogrammed however she reports headaches in December on 16th, 19th, 29th and the 31st.  She was asked to keep track of headaches is she began to have them.  She call today with another headache.  Her headaches are right sided or the entire top of her head.  She has had headaches on Jan 2nd, 4th, 6th, 7th and today.  They last for over an hour.  She is using ibuprofen for discomfort which is helping.  We will refer her to [facility] Weiner for headache evaluation.  She was asked to continue to keep record of her headaches as she has been but also to include the time of day.

## 2019-01-10 ENCOUNTER — OFFICE VISIT (OUTPATIENT)
Dept: NEUROLOGY | Facility: CLINIC | Age: 29
End: 2019-01-10
Payer: COMMERCIAL

## 2019-01-10 VITALS — OXYGEN SATURATION: 99 % | SYSTOLIC BLOOD PRESSURE: 101 MMHG | DIASTOLIC BLOOD PRESSURE: 61 MMHG | HEART RATE: 75 BPM

## 2019-01-10 DIAGNOSIS — D32.9 MENINGIOMA (H): ICD-10-CM

## 2019-01-10 DIAGNOSIS — R51.9 CHRONIC DAILY HEADACHE: Primary | ICD-10-CM

## 2019-01-10 RX ORDER — TOPIRAMATE 25 MG/1
TABLET, FILM COATED ORAL
Qty: 60 TABLET | Refills: 3 | Status: SHIPPED | OUTPATIENT
Start: 2019-01-10 | End: 2019-05-20

## 2019-01-10 ASSESSMENT — PAIN SCALES - GENERAL: PAINLEVEL: MODERATE PAIN (4)

## 2019-01-10 NOTE — PATIENT INSTRUCTIONS
Plan:  Headache log for severity and frequency  Hydration-at least 10+ glasses of water  A trial of topiramate 25 mg at bedtime for one week then 50 mg at bedtime. Use back up birth control. Pregnancy contraindicated while taking topiramate. Drink at least 10+ glasses of water. Call back with any concerns especially severe mood changes.   Ibuprofen as needed but limit use to no more than 2 days per week.   Vitamin B2 (riboflavin) 400 mg daily OTC  Follow up in 6 weeks or sooner if needed        Patient Education     Patient Education    Topiramate Oral capsule, extended-release    Topiramate Oral capsule, sprinkles    Topiramate Oral tablet  Topiramate Oral tablet  What is this medicine?  TOPIRAMATE (toe PYRE a mate) is used to treat seizures in adults or children with epilepsy. It is also used for the prevention of migraine headaches.  This medicine may be used for other purposes; ask your health care provider or pharmacist if you have questions.  What should I tell my health care provider before I take this medicine?  They need to know if you have any of these conditions:    bleeding disorders    cirrhosis of the liver or liver disease    diarrhea    glaucoma    kidney stones or kidney disease    low blood counts, like low white cell, platelet, or red cell counts    lung disease like asthma, obstructive pulmonary disease, emphysema    metabolic acidosis    on a ketogenic diet    schedule for surgery or a procedure    suicidal thoughts, plans, or attempt; a previous suicide attempt by you or a family member    an unusual or allergic reaction to topiramate, other medicines, foods, dyes, or preservatives    pregnant or trying to get pregnant    breast-feeding  How should I use this medicine?  Take this medicine by mouth with a glass of water. Follow the directions on the prescription label. Do not crush or chew. You may take this medicine with meals. Take your medicine at regular intervals. Do not take it more often  than directed.  Talk to your pediatrician regarding the use of this medicine in children. Special care may be needed. While this drug may be prescribed for children as young as 2 years of age for selected conditions, precautions do apply.  Overdosage: If you think you have taken too much of this medicine contact a poison control center or emergency room at once.  NOTE: This medicine is only for you. Do not share this medicine with others.  What if I miss a dose?  If you miss a dose, take it as soon as you can. If your next dose is to be taken in less than 6 hours, then do not take the missed dose. Take the next dose at your regular time. Do not take double or extra doses.  What may interact with this medicine?  Do not take this medicine with any of the following medications:    probenecid  This medicine may also interact with the following medications:    acetazolamide    alcohol    amitriptyline    aspirin and aspirin-like medicines    birth control pills    certain medicines for depression    certain medicines for seizures    certain medicines that treat or prevent blood clots like warfarin, enoxaparin, dalteparin, apixaban, dabigatran, and rivaroxaban    digoxin    hydrochlorothiazide    lithium    medicines for pain, sleep, or muscle relaxation    metformin    methazolamide    NSAIDS, medicines for pain and inflammation, like ibuprofen or naproxen    pioglitazone    risperidone  This list may not describe all possible interactions. Give your health care provider a list of all the medicines, herbs, non-prescription drugs, or dietary supplements you use. Also tell them if you smoke, drink alcohol, or use illegal drugs. Some items may interact with your medicine.  What should I watch for while using this medicine?  Visit your doctor or health care professional for regular checks on your progress. Do not stop taking this medicine suddenly. This increases the risk of seizures if you are using this medicine to control  epilepsy. Wear a medical identification bracelet or chain to say you have epilepsy or seizures, and carry a card that lists all your medicines.  This medicine can decrease sweating and increase your body temperature. Watch for signs of  sweating or fever, especially in children. Avoid extreme heat, hot baths, and saunas. Be careful about exercising, especially in hot weather. Contact your health care provider right away if you notice a fever or decrease in sweating.  You should drink plenty of fluids while taking this medicine. If you have had kidney stones in the past, this will help to reduce your chances of forming kidney stones.  If you have stomach pain, with nausea or vomiting and yellowing of your eyes or skin, call your doctor immediately.  You may get drowsy, dizzy, or have blurred vision. Do not drive, use machinery, or do anything that needs mental alertness until you know how this medicine affects you. To reduce dizziness, do not sit or stand up quickly, especially if you are an older patient. Alcohol can increase drowsiness and dizziness. Avoid alcoholic drinks.  If you notice blurred vision, eye pain, or other eye problems, seek medical attention at once for an eye exam.  The use of this medicine may increase the chance of suicidal thoughts or actions. Pay special attention to how you are responding while on this medicine. Any worsening of mood, or thoughts of suicide or dying should be reported to your health care professional right away.  This medicine may increase the chance of developing metabolic acidosis. If left untreated, this can cause kidney stones, bone disease, or slowed growth in children. Symptoms include breathing fast, fatigue, loss of appetite, irregular heartbeat, or loss of consciousness. Call your doctor immediately if you experience any of these side effects. Also, tell your doctor about any surgery you plan on having while taking this medicine since this may increase your  risk for metabolic acidosis.  Birth control pills may not work properly while you are taking this medicine. Talk to your doctor about using an extra method of birth control.  Women who become pregnant while using this medicine may enroll in the [place] Antiepileptic Drug Pregnancy Registry by calling 1-878.579.7319. This registry collects information about the safety of antiepileptic drug use during pregnancy.  What side effects may I notice from receiving this medicine?  Side effects that you should report to your doctor or health care professional as soon as possible:    allergic reactions like skin rash, itching or hives, swelling of the face, lips, or tongue    decreased sweating and/or rise in body temperature    depression    difficulty breathing, fast or irregular breathing patterns    difficulty speaking    difficulty walking or controlling muscle movements    hearing impairment    redness, blistering, peeling or loosening of the skin, including inside the mouth    tingling, pain or numbness in the hands or feet    unusual bleeding or bruising    unusually weak or tired    worsening of mood, thoughts or actions of suicide or dying  Side effects that usually do not require medical attention (report to your doctor or health care professional if they continue or are bothersome):    altered taste    back pain, joint or muscle aches and pains    diarrhea, or constipation    headache    loss of appetite    nausea    stomach upset, indigestion    tremors  This list may not describe all possible side effects. Call your doctor for medical advice about side effects. You may report side effects to FDA at 7-725-FDA-3931.  Where should I keep my medicine?  Keep out of the reach of children.  Store at room temperature between 15 and 30 degrees C (59 and 86 degrees F) in a tightly closed container. Protect from moisture. Throw away any unused medicine after the expiration date.  NOTE:This sheet is a summary. It may  not cover all possible information. If you have questions about this medicine, talk to your doctor, pharmacist, or health care provider. Copyright  2016 [employer]

## 2019-01-10 NOTE — NURSING NOTE
Chief Complaint   Patient presents with     Consult     [facility] NEW - HEADACHES       Pilo Wilder, EMT

## 2019-01-10 NOTE — LETTER
"1/10/2019       RE: Mita Higgins  [address]  [place] [address]     Dear Colleague,    Thank you for referring your patient, Mita Higgins, to the [facility] NEUROLOGY at [facility]. Please see a copy of my visit note below.    Re: Mita Higgins      MRN# [id number]  YOB: 1990  Date of Visit:1/10/2019     OUTPATIENT NEUROLOGY VISIT NOTE    Chief Complaint:  Headache evaluation.     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  It has been radiated a several years ago in [place].   Dr Barrett/Sakshi Rivas CNP on 12/4/2018 and brain MRI was stable, shunt reset on 12/4/2018 and helped with headaches  Accompanied by her mother    Patient reports that headaches since 7th grade and would go to the chiropractor. Family history-brother has migraine headaches and mother has some headaches. Patient reports that headache goes between two sides and sharp. Intensity between 4-6/10 and patient typically takes ibuprofen and lays down. Patient reports that headache is associated with light sensitivity, loud noise sensitivity, no nausea or vomiting.   Headache frequency 30 days per month and lasts for an hour with ibuprofen. Patient reports that she tries to limit ibuprofen use because of GI intolerance but ends up with about 3 days per week ibuprofen 600 mg which helps for a while.   Patient reports that it seems like activity and lights triggers the headache.   Headache " "tried  Ibuprofen and sleep -\"that all I do\" and \"nothing else.\"  No preventive medications tried.   Patient takes strattera for ADHD and mood.   Patient reports that sleep a lot.   Patient denies any risk of pregnancy-not sexually active.   Periods are back after radiation per mother but irregular now. Patient was on provera for a brief time but stopped.   Patient reports that she drinks a lot of water and milk  Patient wears dark shades for light sensitivity  No recent falls or head injuries.   History of head injury at the age of 3 and \"meningities\" 3 weeks old per mother. No personal or family history of seizures.     Neurodiagnostic Testing  MRI 12/4/2018  Impression:  1. Further increase in size of the small right frontal convexity  meningioma measuring 8 x 11 mm (series 101 image 122) previously 5 x 8  mm 6/28/2017 and 3 x 5 mm 6/28/2016.  2. Unchanged meningiomas centered in the prepontine cistern, left  frontal cerebral convexity, and left temporo frontal area dating back  to 6/28/2016.  3. Stable appearance of the left frontal approach ventriculostomy  catheter.  4. T1 bright signal in the bilateral globus pallidus, question  gadolinium deposition.    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)      S/P  shunt        Past Surgical History reviewed and verified with the patient  Past Surgical History:   Procedure Laterality Date     ARTHROPLASTY HIP  7/30/2013    Procedure: ARTHROPLASTY HIP;  Left Total Hip Replacement ;  Surgeon: Oscar Peña MD;  [facility]     ARTHROPLASTY HIP Right 12/19/2014    Procedure: ARTHROPLASTY HIP;  Surgeon: Oscar Peña MD;  [facility]: [facility]     BIOPSY  10/2010    brain tumor     ENT SURGERY      T & A     " IMPLANT SHUNT VENTRICULOPERITONEAL  4/5/2012    Procedure:IMPLANT SHUNT VENTRICULOPERITONEAL; Left Ventriculo-Peritoneal Shunt Placement with Stealth Axiom; Surgeon:DWIGHT HOPKINS; Location:[facility]     OPTICAL TRACKING SYSTEM CRANIOTOMY, EXCISE TUMOR, COMBINED  4/12/2012    Procedure:COMBINED OPTICAL TRACKING SYSTEM CRANIOTOMY, EXCISE TUMOR; Stealth Assisted Suboccipital Craniotomy and Supracerabellar Infratentorial Approach to Brain Tumor; Surgeon:KEN VAUGHN; Location[facility]       Family History reviewed and verified with the patient  Brother and mother-headaches    Social History:  Social History     Tobacco Use     Smoking status: Never Smoker     Smokeless tobacco: Never Used   Substance Use Topics     Alcohol use: Yes     Comment: rare---- only on special occasions     reviewed and verified with the patient     Allergies   Allergen Reactions     Nka [No Known Allergies]      Nkda [No Known Drug Allergies]      Seasonal Allergies        Current Outpatient Medications   Medication Sig Dispense Refill     acetaminophen (TYLENOL) 325 MG tablet Take 2 tablets (650 mg) by mouth every 6 hours 100 tablet 1     atomoxetine (STRATTERA) 40 MG capsule Take 1 capsule by mouth daily Hold DOS 12-19       ranitidine (ZANTAC) 150 MG tablet Take by mouth 2 times daily 60 tablet      cholecalciferol (VITAMIN D) 1000 UNIT tablet Take 2 tablets by mouth daily. (Patient not taking: Reported on 6/28/2017) 30 tablet 2     medroxyPROGESTERone (PROVERA) 10 MG tablet Take 10 mg by mouth     reviewed and verified with the patient    Review of Systems:  A 12-point ROS including constitutional, eyes, ENT, respiratory, cardiovascular, gastroenterology, genitourinary, integumentary, musculoskeletal, neurology, hematology and psychiatric were all reviewed with the patient and completed at the [facility] and as mentioned in the HPI.     General Exam:   /61 (BP Location: Left arm, Patient Position:  Sitting, Cuff Size: Adult Regular)   Pulse 75   SpO2 99%   GEN: Awake, NAD; good eye contact, responses appropriately, non toxic appearance   HEENT: Head atraumatic/Normocephalic. Scalp normal. Pupils equally round, 4 mm, reactive to light and accommodation, sclera and conjunctiva normal. Fundoscopic examination reveals normal vessels no papilledema.   Neck: Easily moveable without resistance  Heart: S1/S2 appreciated, RRR, no m/r/g, no carotid bruits  Lungs:Lungs are clear to auscultation bilaterally, no wheezes or crackles.   Neurological Examination:  The patient is alert and oriented times four. Has good attention and concentration.  Speech is fluent without dysarthria.   Cranial nerves:  CN I deferred.   CN II: Intact and full visual fields to confrontation bilaterally.    CN III, IV, VI: EOM intact. There is no nystagmus. Has conjugated gaze. Intact direct and consensual pupillary light reflexes.   CN V: Intact and symmetrical to facial sensation in the V1 through V3 bilaterally.   CN VII: Intact and symmetrical eyebrow and lid raise and eyelid closure, smiles and frown.   CN VIII: Intact to finger rub bilaterally.   CN IX and X: The palates elevates symmetrical. The uvula is midline.   CN XII: The tongue protrudes midline with no atrophy or fasciculations.   Motor exam: The patient has a normal bulk and tone throughout. There is no atrophy, fasciculations, clonus, or abnormal movements appreciated.   Strength Exam:  5/5 strength at shoulder abduction, elbow flexion or extension, wrist flexion or extension, finger abduction, , hip flexion and extension, knee flexion and extension, and dorsiflexion and plantarflexion bilaterally.   Sensation is intact to light touch  throughout. Reflexes are 2+ and symmetrical at biceps, triceps, brachioradialis, patellar, and Achilles.   Negative Babinski with downgoing toes bilaterally.   Coordination reveals finger-nose-finger, rapid alternating movements with normal  speed and accuracy. Normal casual gait.    DATA:  Last hemoglobin 13.9 in Nov of 2018      Assessment and Plan:  Headache daily chronic mixed etiology. 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   Stable neurological exam today. Patient's headaches possible multifactorial and discussed medical treatment of headaches and with main focus of decreasing severity and decreasing need for acute analgesics use. Discussed treatment options with the patient and decided to proceed with topiramate. Patient denies any history of renal calculi. Discussed topiramate side effects including but not limited to paresthesia, nephrolithiasis, teratogenic effect, mood changes, taste and food aversion, GI intolerance and other -attached to after visit summary. Patient excepts the risk and agreed to a trial.     Plan discussed with the patient:  Headache log for severity and frequency  Hydration-at least 10+ glasses of water  A trial of topiramate 25 mg at bedtime for one week then 50 mg at bedtime. Use back up birth control. Pregnancy contraindicated while taking topiramate. Drink at least 10+ glasses of water. Call back with any concerns especially severe mood changes.   Ibuprofen as needed but limit use to no more than 2 days per week.   Vitamin B2 (riboflavin) 400 mg daily OTC  Follow up in 6 weeks or sooner if needed    I discussed all my recommendation with Mita Higgins. The patient verbalizes understanding and comfortable with the plan. The patient has our clinic phone number to call with any questions or concerns. All of the patient's questions were answered from the best of my current knowledge.     Thank you for letting me be a part of the treatment team for Mita Higgins    Time spent with pt answering questions, discussing findings, counseling and coordinating  care was more than 50% the appointment time, 58  minutes.     LOUISE Pickett, CNP  [facility]

## 2019-01-10 NOTE — PROGRESS NOTES
"Re: Mita Higgins      MRN# [id number]  YOB: 1990  Date of Visit:1/10/2019     OUTPATIENT NEUROLOGY VISIT NOTE    Chief Complaint:  Headache evaluation.     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  It has been radiated a several years ago in [place].   Dr Barrett/Sakshi Rivas CNP on 12/4/2018 and brain MRI was stable, shunt reset on 12/4/2018 and helped with headaches  Accompanied by her mother    Patient reports that headaches since 7th grade and would go to the chiropractor. Family history-brother has migraine headaches and mother has some headaches. Patient reports that headache goes between two sides and sharp. Intensity between 4-6/10 and patient typically takes ibuprofen and lays down. Patient reports that headache is associated with light sensitivity, loud noise sensitivity, no nausea or vomiting.   Headache frequency 30 days per month and lasts for an hour with ibuprofen. Patient reports that she tries to limit ibuprofen use because of GI intolerance but ends up with about 3 days per week ibuprofen 600 mg which helps for a while.   Patient reports that it seems like activity and lights triggers the headache.   Headache tried  Ibuprofen and sleep -\"that all I do\" and \"nothing else.\"  No preventive medications tried.   Patient takes strattera for ADHD and mood.   Patient reports that sleep a lot.   Patient denies any risk of pregnancy-not sexually active.   Periods are back after radiation per mother but " "irregular now. Patient was on provera for a brief time but stopped.   Patient reports that she drinks a lot of water and milk  Patient wears dark shades for light sensitivity  No recent falls or head injuries.   History of head injury at the age of 3 and \"meningities\" 3 weeks old per mother. No personal or family history of seizures.     Neurodiagnostic Testing  MRI 12/4/2018  Impression:  1. Further increase in size of the small right frontal convexity  meningioma measuring 8 x 11 mm (series 101 image 122) previously 5 x 8  mm 6/28/2017 and 3 x 5 mm 6/28/2016.  2. Unchanged meningiomas centered in the prepontine cistern, left  frontal cerebral convexity, and left temporo frontal area dating back  to 6/28/2016.  3. Stable appearance of the left frontal approach ventriculostomy  catheter.  4. T1 bright signal in the bilateral globus pallidus, question  gadolinium deposition.    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)      S/P  shunt        Past Surgical History reviewed and verified with the patient  Past Surgical History:   Procedure Laterality Date     ARTHROPLASTY HIP  7/30/2013    Procedure: ARTHROPLASTY HIP;  Left Total Hip Replacement ;  Surgeon: Oscar Peña MD;  [facility]     ARTHROPLASTY HIP Right 12/19/2014    Procedure: ARTHROPLASTY HIP;  Surgeon: Oscar Peña MD;  [facility]     BIOPSY  10/2010    brain tumor     ENT SURGERY      T & A     IMPLANT SHUNT VENTRICULOPERITONEAL  4/5/2012    Procedure:IMPLANT SHUNT VENTRICULOPERITONEAL; Left Ventriculo-Peritoneal Shunt Placement with Stealth Axiom; Surgeon:DWIGHT HOPKINS; [facility]     OPTICAL TRACKING SYSTEM CRANIOTOMY, EXCISE TUMOR, COMBINED  4/12/2012    " Procedure:COMBINED OPTICAL TRACKING SYSTEM CRANIOTOMY, EXCISE TUMOR; Stealth Assisted Suboccipital Craniotomy and Supracerabellar Infratentorial Approach to Brain Tumor; Surgeon:KEN VAUGHN; Location:[facility]       Family History reviewed and verified with the patient  Brother and mother-headaches    Social History:  Social History     Tobacco Use     Smoking status: Never Smoker     Smokeless tobacco: Never Used   Substance Use Topics     Alcohol use: Yes     Comment: rare---- only on special occasions     reviewed and verified with the patient     Allergies   Allergen Reactions     Nka [No Known Allergies]      Nkda [No Known Drug Allergies]      Seasonal Allergies        Current Outpatient Medications   Medication Sig Dispense Refill     acetaminophen (TYLENOL) 325 MG tablet Take 2 tablets (650 mg) by mouth every 6 hours 100 tablet 1     atomoxetine (STRATTERA) 40 MG capsule Take 1 capsule by mouth daily Hold DOS 12-19       ranitidine (ZANTAC) 150 MG tablet Take by mouth 2 times daily 60 tablet      cholecalciferol (VITAMIN D) 1000 UNIT tablet Take 2 tablets by mouth daily. (Patient not taking: Reported on 6/28/2017) 30 tablet 2     medroxyPROGESTERone (PROVERA) 10 MG tablet Take 10 mg by mouth     reviewed and verified with the patient    Review of Systems:  A 12-point ROS including constitutional, eyes, ENT, respiratory, cardiovascular, gastroenterology, genitourinary, integumentary, musculoskeletal, neurology, hematology and psychiatric were all reviewed with the patient and completed at the [facility] and as mentioned in the HPI.     General Exam:   /61 (BP Location: Left arm, Patient Position: Sitting, Cuff Size: Adult Regular)   Pulse 75   SpO2 99%   GEN: Awake, NAD; good eye contact, responses appropriately, non toxic appearance   HEENT: Head atraumatic/Normocephalic. Scalp normal. Pupils equally round, 4 mm, reactive to light and accommodation, sclera and  conjunctiva normal. Fundoscopic examination reveals normal vessels no papilledema.   Neck: Easily moveable without resistance  Heart: S1/S2 appreciated, RRR, no m/r/g, no carotid bruits  Lungs:Lungs are clear to auscultation bilaterally, no wheezes or crackles.   Neurological Examination:  The patient is alert and oriented times four. Has good attention and concentration.  Speech is fluent without dysarthria.   Cranial nerves:  CN I deferred.   CN II: Intact and full visual fields to confrontation bilaterally.    CN III, IV, VI: EOM intact. There is no nystagmus. Has conjugated gaze. Intact direct and consensual pupillary light reflexes.   CN V: Intact and symmetrical to facial sensation in the V1 through V3 bilaterally.   CN VII: Intact and symmetrical eyebrow and lid raise and eyelid closure, smiles and frown.   CN VIII: Intact to finger rub bilaterally.   CN IX and X: The palates elevates symmetrical. The uvula is midline.   CN XII: The tongue protrudes midline with no atrophy or fasciculations.   Motor exam: The patient has a normal bulk and tone throughout. There is no atrophy, fasciculations, clonus, or abnormal movements appreciated.   Strength Exam:  5/5 strength at shoulder abduction, elbow flexion or extension, wrist flexion or extension, finger abduction, , hip flexion and extension, knee flexion and extension, and dorsiflexion and plantarflexion bilaterally.   Sensation is intact to light touch  throughout. Reflexes are 2+ and symmetrical at biceps, triceps, brachioradialis, patellar, and Achilles.   Negative Babinski with downgoing toes bilaterally.   Coordination reveals finger-nose-finger, rapid alternating movements with normal speed and accuracy. Normal casual gait.    DATA:  Last hemoglobin 13.9 in Nov of 2018      Assessment and Plan:  Headache daily chronic mixed etiology. History of complex suprasellar and skull base meningioma diagnosed in 2010bilateral petrosphenoclival meningioma, s/p  craniotomy and tumor debulking in 2012,  s/p  shunt and s/p s/p proton beam radiotherapy.   Stable neurological exam today. Patient's headaches possible multifactorial and discussed medical treatment of headaches and with main focus of decreasing severity and decreasing need for acute analgesics use. Discussed treatment options with the patient and decided to proceed with topiramate. Patient denies any history of renal calculi. Discussed topiramate side effects including but not limited to paresthesia, nephrolithiasis, teratogenic effect, mood changes, taste and food aversion, GI intolerance and other -attached to after visit summary. Patient excepts the risk and agreed to a trial.     Plan discussed with the patient:  Headache log for severity and frequency  Hydration-at least 10+ glasses of water  A trial of topiramate 25 mg at bedtime for one week then 50 mg at bedtime. Use back up birth control. Pregnancy contraindicated while taking topiramate. Drink at least 10+ glasses of water. Call back with any concerns especially severe mood changes.   Ibuprofen as needed but limit use to no more than 2 days per week.   Vitamin B2 (riboflavin) 400 mg daily OTC  Follow up in 6 weeks or sooner if needed    I discussed all my recommendation with Mita Higgins. The patient verbalizes understanding and comfortable with the plan. The patient has our clinic phone number to call with any questions or concerns. All of the patient's questions were answered from the best of my current knowledge.     Thank you for letting me be a part of the treatment team for Mita Higgins    Time spent with pt answering questions, discussing findings, counseling and coordinating care was more than 50% the appointment time, 58  minutes.         LOUISE Pickett, CNP  [facility]

## 2019-02-15 ENCOUNTER — HEALTH MAINTENANCE LETTER (OUTPATIENT)
Age: 29
End: 2019-02-15

## 2019-02-21 ENCOUNTER — TELEPHONE (OUTPATIENT)
Dept: NEUROLOGY | Facility: CLINIC | Age: 29
End: 2019-02-21

## 2019-02-21 ENCOUNTER — VIRTUAL VISIT (OUTPATIENT)
Dept: NEUROLOGY | Facility: CLINIC | Age: 29
End: 2019-02-21
Payer: COMMERCIAL

## 2019-02-21 DIAGNOSIS — R51.9 CHRONIC DAILY HEADACHE: Primary | ICD-10-CM

## 2019-02-21 NOTE — PROGRESS NOTES
Med follow up. Called and spoke to the patient. Headaches are better and no headaches at night. Mild headaches in the day time but mild and tolerable. Patient reports that topiramate has been helping with headaches. Currently she takes 25 mg at bedtime and tried 50 mg at bedtime but made her tired. May try 37.5 mg  for a few weeks  Then increase the dose to 50 mg at bedtime. Patient would like to continue with 25 mg at bedtime. Patient reports that she has been staying hydrated and in overall doing well. She sound happy on the phone.   Follow up on 6/4 for headaches and med recheck. Patient is in agreement with the plan.

## 2019-05-20 DIAGNOSIS — R51.9 CHRONIC DAILY HEADACHE: ICD-10-CM

## 2019-05-22 ENCOUNTER — TELEPHONE (OUTPATIENT)
Dept: NEUROLOGY | Facility: CLINIC | Age: 29
End: 2019-05-22

## 2019-05-22 NOTE — TELEPHONE ENCOUNTER
"Spoke with pt. She is currently taking 25mg Topiramate at bedtime and is happy with this dose. She has been doing \"ok\" since last med check in February and reports \"very little\" headache days.   "

## 2019-05-24 RX ORDER — TOPIRAMATE 25 MG/1
TABLET, FILM COATED ORAL
Qty: 30 TABLET | Refills: 6 | Status: ON HOLD | OUTPATIENT
Start: 2019-05-24 | End: 2021-09-13

## 2019-06-04 ENCOUNTER — HOSPITAL ENCOUNTER (OUTPATIENT)
Dept: MRI IMAGING | Facility: CLINIC | Age: 29
Discharge: HOME | End: 2019-06-04
Attending: NURSE PRACTITIONER | Admitting: NURSE PRACTITIONER
Payer: COMMERCIAL

## 2019-06-04 ENCOUNTER — OFFICE VISIT (OUTPATIENT)
Dept: CONSULT | Facility: CLINIC | Age: 29
End: 2019-06-04
Attending: GENETIC COUNSELOR, MS
Payer: COMMERCIAL

## 2019-06-04 ENCOUNTER — OFFICE VISIT (OUTPATIENT)
Dept: PEDIATRIC HEMATOLOGY/ONCOLOGY | Facility: CLINIC | Age: 29
End: 2019-06-04
Attending: PEDIATRICS
Payer: COMMERCIAL

## 2019-06-04 ENCOUNTER — OFFICE VISIT (OUTPATIENT)
Dept: NEUROLOGY | Facility: CLINIC | Age: 29
End: 2019-06-04
Payer: COMMERCIAL

## 2019-06-04 VITALS
HEART RATE: 64 BPM | WEIGHT: 197 LBS | SYSTOLIC BLOOD PRESSURE: 105 MMHG | TEMPERATURE: 98 F | OXYGEN SATURATION: 97 % | HEIGHT: 67 IN | BODY MASS INDEX: 30.92 KG/M2 | RESPIRATION RATE: 16 BRPM | DIASTOLIC BLOOD PRESSURE: 73 MMHG

## 2019-06-04 DIAGNOSIS — D42.9 MENINGIOMA, MULTIPLE (H): Primary | ICD-10-CM

## 2019-06-04 DIAGNOSIS — N92.6 IRREGULAR PERIODS: ICD-10-CM

## 2019-06-04 DIAGNOSIS — Z98.2 S/P VP SHUNT: ICD-10-CM

## 2019-06-04 DIAGNOSIS — D49.6 INTRACRANIAL TUMOR (H): ICD-10-CM

## 2019-06-04 DIAGNOSIS — D42.9 MENINGIOMA, MULTIPLE (H): ICD-10-CM

## 2019-06-04 DIAGNOSIS — R51.9 CHRONIC DAILY HEADACHE: Primary | ICD-10-CM

## 2019-06-04 PROBLEM — H53.001 AMBLYOPIA OF RIGHT EYE: Status: ACTIVE | Noted: 2019-06-04

## 2019-06-04 PROBLEM — H52.13 MYOPIA, BILATERAL: Status: ACTIVE | Noted: 2019-06-04

## 2019-06-04 PROBLEM — H52.223 REGULAR ASTIGMATISM, BILATERAL: Status: ACTIVE | Noted: 2019-06-04

## 2019-06-04 PROBLEM — H50.21 VERTICAL STRABISMUS OF RIGHT EYE: Status: ACTIVE | Noted: 2019-06-04

## 2019-06-04 LAB — HCG UR QL: NEGATIVE

## 2019-06-04 PROCEDURE — 25500064 ZZH RX 255 OP 636: Performed by: NURSE PRACTITIONER

## 2019-06-04 PROCEDURE — 81406 MOPATH PROCEDURE LEVEL 7: CPT | Performed by: PEDIATRICS

## 2019-06-04 PROCEDURE — 70553 MRI BRAIN STEM W/O & W/DYE: CPT

## 2019-06-04 PROCEDURE — 81405 MOPATH PROCEDURE LEVEL 6: CPT | Performed by: PEDIATRICS

## 2019-06-04 PROCEDURE — 36415 COLL VENOUS BLD VENIPUNCTURE: CPT | Performed by: PEDIATRICS

## 2019-06-04 PROCEDURE — 81479 UNLISTED MOLECULAR PATHOLOGY: CPT | Performed by: PEDIATRICS

## 2019-06-04 PROCEDURE — 84999 UNLISTED CHEMISTRY PROCEDURE: CPT | Performed by: PEDIATRICS

## 2019-06-04 PROCEDURE — A9585 GADOBUTROL INJECTION: HCPCS | Performed by: NURSE PRACTITIONER

## 2019-06-04 PROCEDURE — 96040 ZZH GENETIC COUNSELING, EACH 30 MINUTES: CPT | Mod: ZF | Performed by: GENETIC COUNSELOR, MS

## 2019-06-04 RX ORDER — GADOBUTROL 604.72 MG/ML
10 INJECTION INTRAVENOUS ONCE
Status: COMPLETED | OUTPATIENT
Start: 2019-06-04 | End: 2019-06-04

## 2019-06-04 RX ADMIN — GADOBUTROL 8.8 ML: 604.72 INJECTION INTRAVENOUS at 10:57

## 2019-06-04 ASSESSMENT — ENCOUNTER SYMPTOMS
ABDOMINAL PAIN: 1
HEADACHES: 1
SPEECH CHANGE: 0
BLOOD IN STOOL: 0
ARTHRALGIAS: 0
NAUSEA: 0
HEARTBURN: 1
JAUNDICE: 0
FLANK PAIN: 0
DECREASED LIBIDO: 0
BACK PAIN: 0
CONSTIPATION: 0
NAUSEA: 1
HEMATURIA: 0
STIFFNESS: 1
RECTAL PAIN: 0
MYALGIAS: 1
NUMBNESS: 0
JOINT SWELLING: 0
TINGLING: 0
BOWEL INCONTINENCE: 0
NECK PAIN: 0
TREMORS: 0
VOMITING: 0
BACK PAIN: 1
HOT FLASHES: 0
DIARRHEA: 0
NECK PAIN: 0
MUSCLE WEAKNESS: 0
WEAKNESS: 0
MYALGIAS: 1
PARALYSIS: 0
BLOATING: 0
VOMITING: 0
DYSURIA: 0
ABDOMINAL PAIN: 1
DIFFICULTY URINATING: 0
MUSCLE CRAMPS: 0

## 2019-06-04 ASSESSMENT — PAIN SCALES - GENERAL: PAINLEVEL: NO PAIN (0)

## 2019-06-04 ASSESSMENT — MIFFLIN-ST. JEOR: SCORE: 1653.83

## 2019-06-04 NOTE — MR AVS SNAPSHOT
After Visit Summary   6/4/2019    Mita Higgins    MRN: [id number]           Patient Information     Date Of Birth          1990        Visit Information        Provider Department      6/4/2019 1:00 PM Pilo Barrett MD [facility] PEDS NEUROFIBROMATOSIS         Follow-ups after your visit        Your next 10 appointments already scheduled     Jun 04, 2019 11:00 AM CDT   MR BRAIN W/O & W CONTRAST with URMR2   [facility] [place] ([facility])    [address] [place] [address] [phone]           How do I prepare for my exam? (Food and drink instructions) **If you will be receiving sedation or general anesthesia, please see special notes below.**  How do I prepare for my exam? (Other instructions) Take your medicines as usual, unless your doctor tells you not to. You may or may not receive intravenous (IV) contrast for this exam pending the discretion of the Radiologist.  You do not need to do anything special to prepare.  **If you will be receiving sedation or general anesthesia, please see special notes below.**  What should I wear: The MRI machine uses a strong magnet. Please wear clothes without metal (snaps, zippers). A sweatsuit works well, or we may give you a hospital gown. Please remove any body piercings and hair extensions before you arrive. You will also remove watches, jewelry, hairpins, wallets, dentures, partial dental plates and hearing aids. You may wear contact lenses, and you may be able to wear your rings. We have a safe place to keep your personal items, but it is safer to leave them at home.  How long does the exam take: Most tests take 30 to 60 minutes.  HOWEVER, IF YOUR DOCTOR PRESCRIBES ANESTHESIA please plan on spending four to five hours in the recovery room.  What should I bring:  Bring a list of your current medicines to your exam (including vitamins, minerals and over-the-counter drugs).  Do I  need a :  **If you will be receiving sedation or general anesthesia, please see special notes below.**  What should I do after the exam: No Restrictions, You may resume normal activities.  What is this test: MRI (magnetic resonance imaging) uses a strong magnet and radio waves to look inside the body. An MRA (magnetic resonance angiogram) does the same thing, but it lets us look at your blood vessels. A computer turns the radio waves into pictures showing cross sections of the body, much like slices of bread. This helps us see any problems more clearly. You may receive fluid (called  contrast ) before or during your scan. The fluid helps us see the pictures better. We give the fluid through an IV (small needle in your arm).  Who should I call with questions:  Please call the Imaging Department at your exam site with any questions. Directions, parking instructions, and other information is available on our website, [email or web address].  How do I prepare if I m having sedation or anesthesia? **IMPORTANT** THE INSTRUCTIONS BELOW ARE ONLY FOR THOSE PATIENTS WHO HAVE BEEN TOLD THEY WILL RECEIVE SEDATION OR GENERAL ANESTHESIA DURING THEIR MRI PROCEDURE:  IF YOU WILL RECEIVE SEDATION (take medicine to help you relax during your exam): You must get the medicine from your doctor before you arrive. Bring the medicine to the exam. Do not take it at home. Arrive one hour early. Bring someone who can take you home after the test. Your medicine will make you sleepy. After the exam, you may not drive, take a bus or take a taxi by yourself. No eating 8 hours before your exam. You may have clear liquids up until 4 hours before your exam. (Clear liquids include water, clear tea, black coffee and fruit juice without pulp.)  IF YOU WILL RECEIVE ANESTHESIA (be asleep for your exam): Arrive 1 1/2 hours early. Bring someone who can take you home after the test. You may not drive, take a bus or take a taxi by yourself. No eating 8  hours before your exam. You may have clear liquids up until 4 hours before your exam. (Clear liquids include water, clear tea, black coffee and fruit juice without pulp.)            2019  1:00 PM CDT   Return Visit with Pilo Barrett MD   [facility] PEDS NEUROFIBROMATOSIS ([facility])    [facility]  9th Floor  [address] [address] [phone]            2019  1:00 PM CDT   Genetic Counseling with Jonelle Freedman GC   [facility] NEUROFIBROMATOSIS ([facility])    [facility]  9th Floor  [address] [address] [phone]              Future tests that were ordered for you today     Open Future Orders        Priority Expected Expires Ordered    MR Brain w/o & w Contrast Routine 2019            Who to contact     Please call your clinic at [phone] to:    Ask questions about your health    Make or cancel appointments    Discuss your medicines    Learn about your test results    Speak to your doctor            Additional Information About Your Visit        [employer] Information     [employer] is an electronic gateway that provides easy, online access to your medical records. With [employer], you can request a clinic appointment, read your test results, renew a prescription or communicate with your care team.     To sign up for [employer] visit the website at [email or web address]   You will be asked to enter the access code listed below, as well as some personal information. Please follow the directions to create your username and password.     Your access code is: [email or web address]  Expires: 3/6/2019  9:14 AM     Your access code will  in 90 days. If you need help or a new code, please contact your [facility] or call [phone] for assistance.        Care EveryWhere ID     This is your Care EveryWhere ID. This could be used by other  organizations to access your [place] medical records  [id number]         Blood Pressure from Last 3 Encounters:   12/04/18 (!) 110/94   06/28/17 107/72   06/28/16 118/66    Weight from Last 3 Encounters:   12/04/18 88.5 kg (195 lb 1.7 oz)   06/28/17 84.8 kg (186 lb 15.2 oz)   06/28/16 92.2 kg (203 lb 3.2 oz)              Today, you had the following     No orders found for display       Primary Care Provider Office Phone # Fax #    Mitchel Aldridge [phone]       [address] [address]        Equal Access to Services     SAMUEL BROWN : Ezra Fenton, waaxda luqadaha, qaybta kaalmada adeegyarodger, barrie mcgraw. So [facility] [phone].    ATENCIÓN: Si habla español, tiene a floyd disposición servicios gratuitos de asistencia lingüística. Ll[facility] [phone].    We comply with applicable federal civil rights laws and [place] laws. We do not discriminate on the basis of race, color, national origin, age, disability, sex, sexual orientation, or gender identity.            Thank you!     Thank you for choosing [facility] NEUROFIBROMATOSIS  for your care. Our goal is always to provide you with excellent care. Hearing back from our patients is one way we can continue to improve our services. Please take a few minutes to complete the written survey that you may receive in the mail after your visit with us. Thank you!             Your Updated Medication List - Protect others around you: Learn how to safely use, store and throw away your medicines at www.disposemymeds.org.          This list is accurate as of 12/6/18  9:14 AM.  Always use your most recent med list.                   Brand Name Dispense Instructions for use Diagnosis    acetaminophen 325 MG tablet    TYLENOL    100 tablet    Take 2 tablets (650 mg) by mouth every 6 hours    S/P total hip arthroplasty       medroxyPROGESTERone 10 MG tablet    PROVERA      Take 10 mg by mouth        ranitidine 150 MG tablet    ZANTAC    60 tablet    Take by mouth 2 times daily    Meningioma (H)       STRATTERA 40 MG capsule   Generic drug:  atomoxetine      Take 1 capsule by mouth daily Hold DOS 12-19        vitamin D3 1000 units (25 mcg) tablet    CHOLECALCIFEROL    30 tablet    Take 2 tablets by mouth daily.    Meningioma (H)

## 2019-06-04 NOTE — LETTER
"  6/4/2019      RE: Mita STONE Ronaldo  [address]  [place] [address]       Presenting information: Mita is a 28 year old female with a history of multiple meningiomas. She has been seen previously in the [facility] clinic and was evaluated by Dr. Barrett today.   Mita was accompanied to her appointment by her mother, Riri, and step-father, Juancho.  I met with the family at the request of Dr. Barrett to obtain a personal and family history, discuss possible genetic contributions to her symptoms, and to obtain informed consent for genetic testing.     Personal History:  Mita has a history of a complex suprasellar and skull base meningioma diagnosed in 2010, bilateral petrosphenoclival meningioma; s/p craniotomy and tumor debulking in 2012; s/p shunt and s/p proton beam radiotherapy. Per Dr Estrada note from 2016, \"The remaining tumor is anterior to the brainstem and completely engulfs the basilar artery and is, therefore, not resectable without unacceptable consequences.  It has been radiated a several years ago in [place].\" Mita has had headaches since the 7th grade. She denies a history of dizziness/tinnitus; she reports taht her right ear is sensitive to loud noises after her surgery. She denies a history of eye concerns or facial weakness. 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 See Dr. Barrett' note for additional details.     Family History: A three generation pedigree was obtained today and scanned into the EMR. The following information is significant:  - Children: no children  - Siblings:  30yo brother with migraines. 25yo sister A&W. Two half brothers living in their 40s with limited contact. No concerns noted for nieces/nephews.   - Maternal:51yo mother with a history of cervical cancer dx30, occasional headaches. No concerns noted for maternal uncle. No maternal cousins. Grandmother with a history of breast cancer dx around age 67. Grandfather with high " "blood pressure and diabetes.   - Paternal: 65yo father with high blood pressure. Aunt  from a blood clot in her brain. No concerns noted for additional aunt, uncle, or cousins. Grandmother  in her 70s from \"age.\" Grandfather  at an older age from Alzheimer's disease.   - Ancestry: ; consanguinity was denied.    Discussion:    Meningiomas are tumors that develop in the meninges, which is the protective membrane that covers the brain and spinal cord. Meningiomas may be sporadic or may present as part of a tumor predisposition syndrome like NF2.    Individuals who have an inherited type of multiple meningiomas have a 50% chance of passing this condition on to each of their children. It is inherited in a dominant pattern in families.     The symptoms may be different in different relatives (variable expression) or a very few symptoms (reduced penetrance).     Multiple meningiomas can also be segmental, meaning that it occurs only in one part of the body.    Testing: The genes associated with hereditary meningiomas include NF2, SMARCB1, SMARCE1 and SUFU.   Genetic testing for hereditary meningiomas involves next generation sequencing of these four genes to look for single nucleotide misspellings, as well as deletion/duplication analysis to look for missing or extra chunks of DNA within the gene.  This testing allows for simultaneous analysis of multiple genes associated with particular symptoms or related disorders.  Testing will be done by the [facility] pending insurance authorization.  We discussed possible results from the testing which can include:      1)  Negative/normal - no mutations were identified in the genes that were analyzed.  A negative result reduces the likelihood of a diagnosis, but cannot definitively exclude a diagnosis.      2)  Positive - a mutation(s) was identified that is known to be associated with meningiomas.  In most cases, a clearly positive result would " confirm a diagnosis on a molecular level.     3)  Variant of uncertain significance (VUS) - a change in the DNA sequence of a particular gene was identified, but there is not enough information to determine if the DNA change is disease-causing or benign.  If a variant of uncertain significance is identified, testing of other relatives may be helpful to provide clarification.  In most cases, identification of a VUS does not confirm a diagnosis and does not result in any clinically actionable recommendations.    Mita consented to this testing and blood was drawn and sent to the [facility] for this testing. A hold was placed on this order to await insurance prior authorization. Results are typically available within 4 to 6 weeks after testing is initiated. We will plan to see Mita for follow-up to discuss these results.    Medical Management: If a mutation is identified in a gene associated with hereditary meningiomas, it will be important for Mita  to be followed in our NF clinic and to have imaging as recommended by the physicians in our  clinic. If Mita Higgins is found to have a mutation in one of these genes, this confirms her diagnosis of the condition and would make targeted testing available for other family members suspected of having the condition.     Plan:  1. Testing of NF2, SMARCB1, SMARCE1 and SUFU.   2. Return pending results of genetic testing and per Dr. Barrett's recommendation.   3. Contact information was provided should any questions arise in the future.     Valentina Sim MS, [facility]  Licensed Genetic Counselor  [phone]    Approximate Time Spent in Consultation: 30 minutes    Valentina Sim GC

## 2019-06-04 NOTE — PROGRESS NOTES
Pediatric Hematology/Oncology Clinic Note     HPI-  Mita Higgins is a 28 year old female with bilateral petrosphenoclival meningioma who presents to the clinic with her mother and step father for a follow up and review of MRI Brain.     Today, Mita continues to have frequent headaches. She will take Ibuprofen and topiramate for her headaches at bedtime. She has also recently started Provera at the same time as the topiramate. She will have 1-2 headaches a day when she wakes up and when she is getting ready for bed. They are not as severe and less often then before. They are not accompanied by nausea or vomiting. Her headaches originate near the location of her shunt.     In the winter time she will have rhinorrhea and cough.     Recently, her stomach has been bothering her and she will feel like she needs to vomit. She will be seeing her PCP for this soon. The pain originates in her left abdomen.     She discussed genetic testing for her and her family with a genetic counselor.      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 Her first day was Saturday June 1st. She lives on her own and sees her mom frequently. She will begin riding horses soon and has been more active this summer. She can ride for one hour maximum.     History was obtained from Mita and her mother.        Allergies   Allergen Reactions     Nka [No Known Allergies]      Nkda [No Known Drug Allergies]      Seasonal Allergies        Current Outpatient Medications   Medication     acetaminophen (TYLENOL) 325 MG tablet     atomoxetine (STRATTERA) 40 MG capsule     cholecalciferol (VITAMIN D) 1000 UNIT tablet     medroxyPROGESTERone (PROVERA) 10 MG tablet     ranitidine (ZANTAC) 150 MG tablet     topiramate (TOPAMAX) 25 MG tablet     No current facility-administered medications for this visit.        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)      S/P  shunt        Past Surgical History:   Procedure Laterality Date     ARTHROPLASTY HIP  7/30/2013    Procedure: ARTHROPLASTY HIP;  Left Total Hip Replacement ;  Surgeon: Oscar Peña MD;  Location: [facility] OR     ARTHROPLASTY HIP Right 12/19/2014    Procedure: ARTHROPLASTY HIP;  Surgeon: Oscar Peña MD;  Location: [facility] OR     BIOPSY  10/2010    brain tumor     ENT SURGERY      T & A     IMPLANT SHUNT VENTRICULOPERITONEAL  4/5/2012    Procedure:IMPLANT SHUNT VENTRICULOPERITONEAL; Left Ventriculo-Peritoneal Shunt Placement with Stealth Axiom; Surgeon:DWIGHT HOPKINS; Location:[facility]     OPTICAL TRACKING SYSTEM CRANIOTOMY, EXCISE TUMOR, COMBINED  4/12/2012    Procedure:COMBINED OPTICAL TRACKING SYSTEM CRANIOTOMY, EXCISE TUMOR; Stealth Assisted Suboccipital Craniotomy and Supracerabellar Infratentorial Approach to Brain Tumor; Surgeon:KEN VAUGHN; Location:[facility]       No family history on file.    Review of Systems   Eyes: Negative for visual disturbance.   Gastrointestinal: Positive for abdominal pain. Negative for nausea and vomiting.   Musculoskeletal: Positive for myalgias (Leg cramping when she sleeps. She will sleep with a pilow between her legs which helps. ). Negative for back pain and neck pain.   Allergic/Immunologic: Positive for environmental allergies (Seasonal allergies. ).   Neurological: Positive for headaches (See HPI. ).     Physical Exam   Constitutional: She is oriented to person, place, and time. She appears well-developed and well-nourished.   HENT:   Head: Normocephalic and atraumatic.   Right Ear: External ear normal.   Left Ear: External ear normal.   Mouth/Throat: Oropharynx is clear and moist. No oropharyngeal exudate.   Eyes: Pupils are equal, round, and reactive to light.  Conjunctivae are normal.   Cardiovascular: Normal rate and regular rhythm.   No murmur heard.  Abdominal: Soft.   Musculoskeletal: She exhibits no edema.   Neurological: She is alert and oriented to person, place, and time. No cranial nerve deficit or sensory deficit. Coordination normal.   Skin: Skin is warm and dry.   Psychiatric: She has a normal mood and affect. Her behavior is normal.         Results for orders placed or performed during the hospital encounter of 12/04/18   MR Brain w/o & w Contrast    Narrative     MR BRAIN W/O & W CONTRAST 12/4/2018 12:39 PM    Provided History: Pt has a  shunt. Strata Valve; Meningioma (H).  ICD-10: Meningioma (H)    Comparison: 6/28/2017, 12/7/2016.    Technique: Multiplanar T1-weighted, axial FLAIR, and susceptibility  images were obtained without intravenous contrast. Following  intravenous gadolinium-based contrast administration, axial  T2-weighted, diffusion, and T1-weighted images (in multiple planes)  were obtained.    Contrast: 7.5 mL of Gadavist    Findings:  Left frontal approach ventriculostomy catheter with tip at the level  of the left foramen of Yepez. Significant metal artifact from the  shunt reservoir over the left frontal convexity.    No substantial change in the dural based enhancing mass lesion  centered about the prepontine cistern with partial encasement of the  basilar artery. This lesion extends bilaterally into Meckel's cave,  the proximal aspects of the internal auditory canals, and the sella  turcica.    No substantial change in the dural based enhancing mass lesion  situated between the left frontal lobe, temporal lobe, and anterior  left temporal bone.    No substantial change in the dural based enhancing lesion overlying  the left frontal convexity.    Further increase in size of a right frontal convexity dural based  enhancing mass lesion measuring 8 x 11 mm (series 101 image 122)  previously 5 x 8 mm 6/28/2017 and 3 x 5 mm  6/28/2016.    There is no midline shift, or evidence of intracranial hemorrhage. The  ventricles are proportionate to the cerebral sulci.  No definite abnormality of the skull marrow signal is noted. The major  vascular intracranial flow-voids are present. The visualized portions  of paranasal sinuses, and mastoid air cells are relatively clear. The  orbits are grossly unremarkable.      Impression    Impression:  1. Further increase in size of the small right frontal convexity  meningioma measuring 8 x 11 mm (series 101 image 122) previously 5 x 8  mm 6/28/2017 and 3 x 5 mm 6/28/2016.  2. Unchanged meningiomas centered in the prepontine cistern, left  frontal cerebral convexity, and left temporo frontal area dating back  to 6/28/2016.  3. Stable appearance of the left frontal approach ventriculostomy  catheter.  4. T1 bright signal in the bilateral globus pallidus, question  gadolinium deposition.    I have personally reviewed the examination and initial interpretation  and I agree with the findings.    MIRTHA WATSON MD       Impression:  1. H/o meningioma s/p subtotal resection and radiation therapy  2. MRI Brain 6/4/19: showed no change, tumors have not grown, shunt is working, normal vasculature - no pathologic reason for headache noted.  3. Meningioma - stable disease    Plan:  1. RTC in 3 months for follow up, or sooner PRN.  2. Obtain genetic testing for Mita and her family.   Symptomatic headache management        This document serves as a record of the services and decisions personally performed and made by Pilo Barrett MD. It was created on his behalf by Damaris joiner trained medical scribe. The creation of this document is based on the provider's statements to the medical scribe.    The documentation recorded by the scribe accurately reflects the services I personally performed and the decisions made by me.      Pilo Barrett    CC  Patient Care Team:  Mitchel Aldridge  MD CESAR as PCP - General  Susan Short MSW as  (Pediatric Hematology/Oncology)  Dread Smith MD as MD (Neurosurgery)  Oscar Peña MD as MD (Orthopedics)  Mehnaz Weiner APRN CNP as Nurse Practitioner (Nurse Practitioner)  TAIWO MIJARES    Copy to patient  ANA MUÑOZ  [address] [facility] [address]  [place] [address]    MR BRAIN W/O & W CONTRAST 6/4/2019 11:32 AM     Provided History: multiple meningiomas; follow-up imaging; Meningioma,  multiple (H); S/P  shunt.  ICD-10: Meningioma, multiple (H); S/P  shunt     Comparison: MRI 12/4/2018, 6/20/2017.     Technique: Multiplanar T1-weighted, axial FLAIR, and susceptibility  images were obtained without intravenous contrast. Following  intravenous gadolinium-based contrast administration, axial  T2-weighted, diffusion, and T1-weighted images (in multiple planes)  were obtained.     Contrast: 8.8ml Gadavist      Findings:  Susceptibility artifact associated with the ventriculostomy valve  degrades multiple images, most pronounced over the left frontal and  parietal regions.     Postsurgical changes of left frontal approach ventriculostomy, with  the catheter tip in the region of the foramen of Yepez. Multiple  homogeneously enhancing dural based extra-axial masses are present  within the prepontine cisterns, right frontoparietal convexity, and  left frontotemporal region. The prepontine cistern mass is unchanged  and encases and narrows the basilar causing basilar artery, extends to  bilateral Meckel's caves and sella turcica. The right cord terminal  cistern meningioma in the right frontoparietal convexity meningiomas  demonstrate reduced diffusion.      There is no midline shift, or evidence of intracranial hemorrhage. The  ventricles are proportionate to the cerebral sulci. Diffusion-weighted  imaging reveals no acute infarction. Susceptibility weighted imaging  reveals scattered foci of hemosiderin staining  within the posterior  fossa and along the right frontal convexity. Flow voids within the  major intracranial vessels are present..     Unchanged subtle T1 hyperintensity bilateral globus pallidus may be  from prior gadolinium deposition.     No abnormality of the skull marrow signal. The visualized portions of  paranasal sinuses, and mastoid air cells are relatively clear. The  orbits are grossly unremarkable.                                                                      Impression:  1. Unchanged multiple enhancing dural base masses, likely representing  meningiomas 2018. Specifically, the meningioma centered in the  prepontine cistern encases the basilar artery causing mild narrowing.  This is also unchanged.  2. Unchanged left frontal approach ventriculostomy catheter tip in the  third ventricle.     I have personally reviewed the examination and initial interpretation  and I agree with the findings.     PAULETTE AMBROSIO MD    Plan:  Return on 3 months with MRI and visit with Dr. Barrett    Thank you for choosing [facility].    It was a pleasure to see you today.      CNS Tumor Team  Pilo Rivas NP APRN  Veronique Snyder RN BSN - Care Coordinator       [facility], 9th Floor - [address] [address]  Scheduling/Appointments: [phone]  Fax: [phone]     Numbers to call:   Monday - Friday, 8:00 am - 5:00 pm:    Office : [phone]    Scheduling/Appointments: [phone]  Nights and weekends:   Call [phone] and ask the  to page the 'Pediatric Heme/Onc fellow on call' if you have an urgent concern that can't wait until the clinic opens.     Scheduling:    [facility], 9th floor [phone]  Infusion Center/Lab: [phone]   Radiology/ [phone]      Services:   [phone]     Veronique Snyder RN, BSN  CNS Tumor Care  Coordinator  Office: [phone]  Pager: [phone]  E-mail: [email or web address]     We encourage you to sign up for [email or web address] for easy communication with us.  Sign up at the clinic  or go to [email or web address].      Please try the [facility] to [facility] ([facility]) phone application for Virtual Tours, Procedure Preparation, Resources, Preparation for Hospital Stay and the Coloring Board.

## 2019-06-04 NOTE — PATIENT INSTRUCTIONS
Plan:   try to increase topiramate 1.5 tab (37.5 mg ) at bedtime for one-two  weeks then may try 2 tabs (50 mg) at bedtime. Side effects-possible kidney stones so stay hydrated  Drink plenty of fluids at least 10 +glasses of water  Consistent sleep hours   Lab UA pregnancy test due to stomach pain and irregular periods   Magnesium 500 mg daily OTC for headache prevention but may cause constipation or diarrhea, bloating or indigestion.    Acute headache treatment-ibuprofen 600-800 mg every 6-8 hours as needed or naproxen 440 every 12 hours as needed  Follow up schedule around late August -September of sooner if needed

## 2019-06-04 NOTE — NURSING NOTE
Chief Complaint   Patient presents with     Headache     [facility] RETURN HEADACHE- MIGRAINE F/U       Ryder Coyle, EMT

## 2019-06-04 NOTE — LETTER
6/4/2019      RE: Mita Higgins  [address]  [facility] [facility] [place] [address]          Pediatric Hematology/Oncology Clinic Note     HPI-  Mita Higgins is a 28 year old female with bilateral petrosphenoclival meningioma who presents to the clinic with her mother and step father for a follow up and review of MRI Brain.     Today, Mita continues to have frequent headaches. She will take Ibuprofen and topiramate for her headaches at bedtime. She has also recently started Provera at the same time as the topiramate. She will have 1-2 headaches a day when she wakes up and when she is getting ready for bed. They are not as severe and less often then before. They are not accompanied by nausea or vomiting. Her headaches originate near the location of her shunt.     In the winter time she will have rhinorrhea and cough.     Recently, her stomach has been bothering her and she will feel like she needs to vomit. She will be seeing her PCP for this soon. The pain originates in her left abdomen.     She discussed genetic testing for her and her family with a genetic counselor.      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 Her first day was Saturday June 1st. She lives on her own and sees her mom frequently. She will begin riding horses soon and has been more active this summer. She can ride for one hour maximum.     History was obtained from Mita and her mother.        Allergies   Allergen Reactions     Nka [No Known Allergies]      Nkda [No Known Drug Allergies]      Seasonal Allergies        Current Outpatient Medications   Medication     acetaminophen (TYLENOL) 325 MG tablet     atomoxetine (STRATTERA) 40 MG capsule     cholecalciferol (VITAMIN D) 1000 UNIT tablet     medroxyPROGESTERone (PROVERA) 10 MG tablet     ranitidine (ZANTAC) 150 MG tablet     topiramate (TOPAMAX) 25 MG tablet     No current facility-administered medications  for this visit.        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)      S/P  shunt        Past Surgical History:   Procedure Laterality Date     ARTHROPLASTY HIP  7/30/2013    Procedure: ARTHROPLASTY HIP;  Left Total Hip Replacement ;  Surgeon: Oscar Peña MD;  Location: [facility] OR     ARTHROPLASTY HIP Right 12/19/2014    Procedure: ARTHROPLASTY HIP;  Surgeon: Oscar Peña MD;  Location: [facility] OR     BIOPSY  10/2010    brain tumor     ENT SURGERY      T & A     IMPLANT SHUNT VENTRICULOPERITONEAL  4/5/2012    Procedure:IMPLANT SHUNT VENTRICULOPERITONEAL; Left Ventriculo-Peritoneal Shunt Placement with Stealth Axiom; Surgeon:DWIGHT HOPKINS; Location:[facility]     OPTICAL TRACKING SYSTEM CRANIOTOMY, EXCISE TUMOR, COMBINED  4/12/2012    Procedure:COMBINED OPTICAL TRACKING SYSTEM CRANIOTOMY, EXCISE TUMOR; Stealth Assisted Suboccipital Craniotomy and Supracerabellar Infratentorial Approach to Brain Tumor; Surgeon:KEN VAUGHN; Location:[facility]       No family history on file.    Review of Systems   Eyes: Negative for visual disturbance.   Gastrointestinal: Positive for abdominal pain. Negative for nausea and vomiting.   Musculoskeletal: Positive for myalgias (Leg cramping when she sleeps. She will sleep with a pilow between her legs which helps. ). Negative for back pain and neck pain.   Allergic/Immunologic: Positive for environmental allergies (Seasonal allergies. ).   Neurological: Positive for headaches (See HPI. ).     Physical Exam   Constitutional: She is oriented to person, place, and time. She appears well-developed and well-nourished.   HENT:   Head: Normocephalic and atraumatic.   Right Ear: External ear normal.   Left Ear: External ear normal.   Mouth/Throat: Oropharynx is clear and moist. No  oropharyngeal exudate.   Eyes: Pupils are equal, round, and reactive to light. Conjunctivae are normal.   Cardiovascular: Normal rate and regular rhythm.   No murmur heard.  Abdominal: Soft.   Musculoskeletal: She exhibits no edema.   Neurological: She is alert and oriented to person, place, and time. No cranial nerve deficit or sensory deficit. Coordination normal.   Skin: Skin is warm and dry.   Psychiatric: She has a normal mood and affect. Her behavior is normal.         Results for orders placed or performed during the hospital encounter of 12/04/18   MR Brain w/o & w Contrast    Narrative     MR BRAIN W/O & W CONTRAST 12/4/2018 12:39 PM    Provided History: Pt has a  shunt. Strata Valve; Meningioma (H).  ICD-10: Meningioma (H)    Comparison: 6/28/2017, 12/7/2016.    Technique: Multiplanar T1-weighted, axial FLAIR, and susceptibility  images were obtained without intravenous contrast. Following  intravenous gadolinium-based contrast administration, axial  T2-weighted, diffusion, and T1-weighted images (in multiple planes)  were obtained.    Contrast: 7.5 mL of Gadavist    Findings:  Left frontal approach ventriculostomy catheter with tip at the level  of the left foramen of Yepez. Significant metal artifact from the  shunt reservoir over the left frontal convexity.    No substantial change in the dural based enhancing mass lesion  centered about the prepontine cistern with partial encasement of the  basilar artery. This lesion extends bilaterally into Meckel's cave,  the proximal aspects of the internal auditory canals, and the sella  turcica.    No substantial change in the dural based enhancing mass lesion  situated between the left frontal lobe, temporal lobe, and anterior  left temporal bone.    No substantial change in the dural based enhancing lesion overlying  the left frontal convexity.    Further increase in size of a right frontal convexity dural based  enhancing mass lesion measuring 8 x 11 mm  (series 101 image 122)  previously 5 x 8 mm 6/28/2017 and 3 x 5 mm 6/28/2016.    There is no midline shift, or evidence of intracranial hemorrhage. The  ventricles are proportionate to the cerebral sulci.  No definite abnormality of the skull marrow signal is noted. The major  vascular intracranial flow-voids are present. The visualized portions  of paranasal sinuses, and mastoid air cells are relatively clear. The  orbits are grossly unremarkable.      Impression    Impression:  1. Further increase in size of the small right frontal convexity  meningioma measuring 8 x 11 mm (series 101 image 122) previously 5 x 8  mm 6/28/2017 and 3 x 5 mm 6/28/2016.  2. Unchanged meningiomas centered in the prepontine cistern, left  frontal cerebral convexity, and left temporo frontal area dating back  to 6/28/2016.  3. Stable appearance of the left frontal approach ventriculostomy  catheter.  4. T1 bright signal in the bilateral globus pallidus, question  gadolinium deposition.    I have personally reviewed the examination and initial interpretation  and I agree with the findings.    MIRTHA WATSON MD       Impression:  1. H/o meningioma s/p resection and radiation therapy  2. MRI Brain 6/4/19: showed no change, tumors have not grown, shunt is working    Plan:  1. RTC in 3 months for follow up, or sooner PRN.  2. Obtain genetic testing for Mita and her family.     Total time spent with patient 15 minutes.     This document serves as a record of the services and decisions personally performed and made by Pilo Barrett MD. It was created on his behalf by Damaris joiner trained medical scribe. The creation of this document is based on the provider's statements to the medical scribe.    The documentation recorded by the scribe accurately reflects the services I personally performed and the decisions made by me.      Pilo SOTOMAYOR  Patient Care Team:  Mitchel Aldridge MD as PCP -  Susan Yadav MSW as  (Pediatric Hematology/Oncology)  Dread Smith MD as MD (Neurosurgery)  Oscar Peña MD as MD (Orthopedics)  Mehnaz Weiner APRN CNP as Nurse Practitioner (Nurse Practitioner)    Copy to patient  ANA MUÑOZ  [address]  [place] [address]    MR BRAIN W/O & W CONTRAST 6/4/2019 11:32 AM     Provided History: multiple meningiomas; follow-up imaging; Meningioma,  multiple (H); S/P  shunt.  ICD-10: Meningioma, multiple (H); S/P  shunt     Comparison: MRI 12/4/2018, 6/20/2017.     Technique: Multiplanar T1-weighted, axial FLAIR, and susceptibility  images were obtained without intravenous contrast. Following  intravenous gadolinium-based contrast administration, axial  T2-weighted, diffusion, and T1-weighted images (in multiple planes)  were obtained.     Contrast: 8.8ml Gadavist      Findings:  Susceptibility artifact associated with the ventriculostomy valve  degrades multiple images, most pronounced over the left frontal and  parietal regions.     Postsurgical changes of left frontal approach ventriculostomy, with  the catheter tip in the region of the foramen of Yepez. Multiple  homogeneously enhancing dural based extra-axial masses are present  within the prepontine cisterns, right frontoparietal convexity, and  left frontotemporal region. The prepontine cistern mass is unchanged  and encases and narrows the basilar causing basilar artery, extends to  bilateral Meckel's caves and sella turcica. The right cord terminal  cistern meningioma in the right frontoparietal convexity meningiomas  demonstrate reduced diffusion.      There is no midline shift, or evidence of intracranial hemorrhage. The  ventricles are proportionate to the cerebral sulci. Diffusion-weighted  imaging reveals no acute infarction. Susceptibility weighted imaging  reveals scattered foci of hemosiderin staining within the posterior  fossa and along  the right frontal convexity. Flow voids within the  major intracranial vessels are present..     Unchanged subtle T1 hyperintensity bilateral globus pallidus may be  from prior gadolinium deposition.     No abnormality of the skull marrow signal. The visualized portions of  paranasal sinuses, and mastoid air cells are relatively clear. The  orbits are grossly unremarkable.                                                                      Impression:  1. Unchanged multiple enhancing dural base masses, likely representing  meningiomas 2018. Specifically, the meningioma centered in the  prepontine cistern encases the basilar artery causing mild narrowing.  This is also unchanged.  2. Unchanged left frontal approach ventriculostomy catheter tip in the  third ventricle.     I have personally reviewed the examination and initial interpretation  and I agree with the findings.     PAULETTE AMBROSIO MD    Plan:  Return on 3 months with MRI and visit with Dr. Barrett    Thank you for choosing [facility].    It was a pleasure to see you today.      CNS Tumor Team  Pilo Snyder RN BSN - Care Coordinator       [facility], 9th Floor - [address] [address]  Scheduling/Appointments: [phone]  Fax: [phone]     Numbers to call:   Monday - Friday, 8:00 am - 5:00 pm:    Office : [phone]    Scheduling/Appointments: [phone]  Nights and weekends:   Call [phone] and ask the  to page the 'Pediatric Heme/Onc fellow on call' if you have an urgent concern that can't wait until the clinic opens.     Scheduling:    [facility], 9th floor [phone]  Infusion Center/Lab: [phone]   Radiology/ [phone]      Services:   [phone]     Veronique Snyder RN, BSN  CNS Tumor Care Coordinator  Office: [phone]  Pager:  [phone]  E-mail: [email or web address]     We encourage you to sign up for [employer] for easy communication with us.  Sign up at the clinic  or go to [email or web address].      Please try the Passport to [facility]) phone application for Virtual Tours, Procedure Preparation, Resources, Preparation for Hospital Stay and the Coloring Board.     Pilo Barrett MD

## 2019-06-04 NOTE — PROGRESS NOTES
"Re: Mita Higgins      MRN# [id number]  YOB: 1990  Date of Visit:6/4/2019     OUTPATIENT NEUROLOGY VISIT NOTE    Reason for Visit:  Headache follow up    Interval History:  Mita Higgins is a 28-year-old female presents to the clinic today for headache follow up.  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  It has been radiated a several years ago in [place].   Dr Barrett/Sakshi Rivas CNP on 12/4/2018 and brain MRI was stable, shunt reset on 12/4/2018     Accompanied to today's appointment by her family. Initial Neurology Clinic visit for headaches on 1/10/2019, see note for details. Patient has started on topiramate for headache prevention and follow up via phone on 2/21/2019 -patient reported headache improvement with starting topiramate.   Today Patient reports that she has been taking topiramate one tab at bedtime and reports that once she tried 50 mg of topiramate  at bedtime and \"it was too much\" and made her \"sleepy\" and she went down on topiramate dose  to one tablet (25 mg ) at bedtime.   Headaches are \"good\" and less \"frequent\" especially at night and less intense.   Recommended to try to increase topiramate 1.5 tab (37.5 mg ) at bedtime for one-two weeks then may try 2 tabs (50 mg) at bedtime if tolerated.   Patient reports that she has too many late night activities at [facility] with her family which may contribute to her headaches.   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   Patient reports that she is normally sleeps about 9 hours but regular schedule may be changing from 10-13 so it may " "affect her headaches.   Regarding acute headache treatment, patient reports that she takes ibuprofen 800 mg as needed and helps with headaches and reports that headaches are less with starting topiramate.   Patient reports that she has some \"stomach\" pain and nausea  Patient reports that she has had irregular periods and no period since 2009 and patient received a pill to force the period and no period since then.   Not clear of sexual activities and will do UA per patient and mother's agreement because of reported stomach pain and nausea and teratogenic effect of topiramate. Patient and mother both aware of teratogenic effect of topiramate and pregnancy contraindications.       Headache treatment plan discussed with the patient:   Try to increase topiramate 1.5 tab (37.5 mg ) at bedtime for one-two  weeks then may try 2 tabs (50 mg) at bedtime. Side effects-possible kidney stones so stay hydrated  Drink plenty of fluids at least 10 +glasses of water  Consistent sleep hours   Lab UA pregnancy test due to stomach pain and irregular periods   Magnesium 500 mg daily OTC for headache prevention but may cause constipation or diarrhea, bloating or indigestion.    Acute headache treatment-ibuprofen 600-800 mg every 6-8 hours as needed or naproxen 440 every 12 hours as needed  Follow up schedule around late August -September of sooner if needed        Past Medical History reviewed   Social History     Tobacco Use     Smoking status: Never Smoker     Smokeless tobacco: Never Used   Substance Use Topics     Alcohol use: Yes     Comment: rare---- only on special occasions     reviewed and verified with the patient     Allergies   Allergen Reactions     Nka [No Known Allergies]      Nkda [No Known Drug Allergies]      Seasonal Allergies        Current Outpatient Medications   Medication Sig Dispense Refill     acetaminophen (TYLENOL) 325 MG tablet Take 2 tablets (650 mg) by mouth every 6 hours 100 tablet 1     atomoxetine " "(STRATTERA) 40 MG capsule Take 1 capsule by mouth daily Hold DOS 12-19       ranitidine (ZANTAC) 150 MG tablet Take by mouth 2 times daily 60 tablet      topiramate (TOPAMAX) 25 MG tablet TAKE ONE TABLET ORALLY AT EACH BEDTIME 30 tablet 6     cholecalciferol (VITAMIN D) 1000 UNIT tablet Take 2 tablets by mouth daily. (Patient not taking: Reported on 6/28/2017) 30 tablet 2     medroxyPROGESTERone (PROVERA) 10 MG tablet Take 10 mg by mouth     reviewed and verified with the patient    Review of Systems:   A 10-point ROS including constitutional, eyes, respiratory, cardiovascular, gastroenterology, genitourinary, integumentary, musculoskeletal, neurology and psychiatric were reviewed and positives as mentioned in the interval history.      General Exam:   /73   Pulse 64   Temp 98  F (36.7  C) (Oral)   Resp 16   Ht 1.698 m (5' 6.85\")   Wt 89.4 kg (197 lb)   SpO2 97%   BMI 30.99 kg/m    GEN: Awake, NAD; good eye contact, responses appropriately, healthy appearing   HEENT: Head atraumatic/Normocephalic. Scalp normal. Pupils equally round, 4 mm, reactive to light and accommodation, sclera and conjunctiva normal. Speech is fluent without dysarthria. EOM intact. There is no nystagmus. Has conjugated gaze. Face is symmetrical.  Strength intact in the upper and lower extremities bilaterally.  Normal casual gait.    Assessment and Plan:  See Interval History for our discussion and plan    I discussed all my recommendation with Mita Higgins. The patient verbalizes understanding and comfortable with the plan. The patient has our clinic phone number to call with any questions or concerns. All of the patient's questions were answered from the best of my current knowledge.     Time spent with pt answering questions, discussing findings, counseling and coordinating care was more than 50% the appointment time, 21  minutes.         LOUISE Pickett, CNP  [facility]    Answers for HPI/ROS submitted by " the patient on 6/4/2019   General Symptoms: No  Skin Symptoms: No  HENT Symptoms: No  EYE SYMPTOMS: No  HEART SYMPTOMS: No  LUNG SYMPTOMS: No  INTESTINAL SYMPTOMS: Yes  URINARY SYMPTOMS: Yes  GYNECOLOGIC SYMPTOMS: Yes  BREAST SYMPTOMS: No  SKELETAL SYMPTOMS: Yes  BLOOD SYMPTOMS: No  NERVOUS SYSTEM SYMPTOMS: Yes  MENTAL HEALTH SYMPTOMS: No  Heart burn or indigestion: Yes  Nausea: Yes  Vomiting: No  Abdominal pain: Yes  Bloating: No  Constipation: No  Diarrhea: No  Blood in stool: No  Black stools: No  Rectal or Anal pain: No  Fecal incontinence: No  Yellowing of skin or eyes: No  Vomit with blood: No  Change in stools: No  Trouble holding urine or incontinence: Yes  Pain or burning: No  Trouble starting or stopping: No  Increased frequency of urination: No  Blood in urine: No  Decreased frequency of urination: No  Frequent nighttime urination: No  Flank pain: No  Difficulty emptying bladder: No  Back pain: Yes  Muscle aches: Yes  Neck pain: No  Swollen joints: No  Joint pain: No  Bone pain: No  Muscle cramps: No  Muscle weakness: No  Joint stiffness: Yes  Bone fracture: No  Speech problems: No  Tingling: No  Tremor: No  Weakness: No  Difficulty walking: No  Paralysis: No  Numbness: No  Bleeding or spotting between periods: No  Heavy or painful periods: No  Irregular periods: Yes  Vaginal discharge: No  Hot flashes: No  Vaginal dryness: No  Genital ulcers: No  Reduced libido: No  Painful intercourse: No  Difficulty with sexual arousal: No  Post-menopausal bleeding: No

## 2019-06-04 NOTE — LETTER
6/4/2019      RE: Mita Higgins  [address]  [place] [facility] [place] [address]          Pediatric Hematology/Oncology Clinic Note     HPI-  Mita Higgins is a 28 year old female with bilateral petrosphenoclival meningioma who presents to the clinic with her mother and step father for a follow up and review of MRI Brain.     Today, Mita continues to have frequent headaches. She will take Ibuprofen and topiramate for her headaches at bedtime. She has also recently started Provera at the same time as the topiramate. She will have 1-2 headaches a day when she wakes up and when she is getting ready for bed. They are not as severe and less often then before. They are not accompanied by nausea or vomiting. Her headaches originate near the location of her shunt.     In the winter time she will have rhinorrhea and cough.     Recently, her stomach has been bothering her and she will feel like she needs to vomit. She will be seeing her PCP for this soon. The pain originates in her left abdomen.     She discussed genetic testing for her and her family with a genetic counselor.      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 Her first day was Saturday June 1st. She lives on her own and sees her mom frequently. She will begin riding horses soon and has been more active this summer. She can ride for one hour maximum.     History was obtained from Mita and her mother.        Allergies   Allergen Reactions     Nka [No Known Allergies]      Nkda [No Known Drug Allergies]      Seasonal Allergies        Current Outpatient Medications   Medication     acetaminophen (TYLENOL) 325 MG tablet     atomoxetine (STRATTERA) 40 MG capsule     cholecalciferol (VITAMIN D) 1000 UNIT tablet     medroxyPROGESTERone (PROVERA) 10 MG tablet     ranitidine (ZANTAC) 150 MG tablet     topiramate (TOPAMAX) 25 MG tablet     No current facility-administered medications  for this visit.        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)      S/P  shunt        Past Surgical History:   Procedure Laterality Date     ARTHROPLASTY HIP  7/30/2013    Procedure: ARTHROPLASTY HIP;  Left Total Hip Replacement ;  Surgeon: Oscar Peña MD;  Location: [facility] OR     ARTHROPLASTY HIP Right 12/19/2014    Procedure: ARTHROPLASTY HIP;  Surgeon: Oscar Peña MD;  Location: [facility] OR     BIOPSY  10/2010    brain tumor     ENT SURGERY      T & A     IMPLANT SHUNT VENTRICULOPERITONEAL  4/5/2012    Procedure:IMPLANT SHUNT VENTRICULOPERITONEAL; Left Ventriculo-Peritoneal Shunt Placement with Stealth Axiom; Surgeon:DWIGHT HOPKINS; Location:[facility] OR     OPTICAL TRACKING SYSTEM CRANIOTOMY, EXCISE TUMOR, COMBINED  4/12/2012    Procedure:COMBINED OPTICAL TRACKING SYSTEM CRANIOTOMY, EXCISE TUMOR; Stealth Assisted Suboccipital Craniotomy and Supracerabellar Infratentorial Approach to Brain Tumor; Surgeon:KEN VAUGHN; Location:[facility]       No family history on file.    Review of Systems   Eyes: Negative for visual disturbance.   Gastrointestinal: Positive for abdominal pain. Negative for nausea and vomiting.   Musculoskeletal: Positive for myalgias (Leg cramping when she sleeps. She will sleep with a pilow between her legs which helps. ). Negative for back pain and neck pain.   Allergic/Immunologic: Positive for environmental allergies (Seasonal allergies. ).   Neurological: Positive for headaches (See HPI. ).     Physical Exam   Constitutional: She is oriented to person, place, and time. She appears well-developed and well-nourished.   HENT:   Head: Normocephalic and atraumatic.   Right Ear: External ear normal.   Left Ear: External ear normal.   Mouth/Throat: Oropharynx is clear and moist. No  oropharyngeal exudate.   Eyes: Pupils are equal, round, and reactive to light. Conjunctivae are normal.   Cardiovascular: Normal rate and regular rhythm.   No murmur heard.  Abdominal: Soft.   Musculoskeletal: She exhibits no edema.   Neurological: She is alert and oriented to person, place, and time. No cranial nerve deficit or sensory deficit. Coordination normal.   Skin: Skin is warm and dry.   Psychiatric: She has a normal mood and affect. Her behavior is normal.         Results for orders placed or performed during the hospital encounter of 12/04/18   MR Brain w/o & w Contrast    Narrative     MR BRAIN W/O & W CONTRAST 12/4/2018 12:39 PM    Provided History: Pt has a  shunt. Strata Valve; Meningioma (H).  ICD-10: Meningioma (H)    Comparison: 6/28/2017, 12/7/2016.    Technique: Multiplanar T1-weighted, axial FLAIR, and susceptibility  images were obtained without intravenous contrast. Following  intravenous gadolinium-based contrast administration, axial  T2-weighted, diffusion, and T1-weighted images (in multiple planes)  were obtained.    Contrast: 7.5 mL of Gadavist    Findings:  Left frontal approach ventriculostomy catheter with tip at the level  of the left foramen of Yepez. Significant metal artifact from the  shunt reservoir over the left frontal convexity.    No substantial change in the dural based enhancing mass lesion  centered about the prepontine cistern with partial encasement of the  basilar artery. This lesion extends bilaterally into Meckel's cave,  the proximal aspects of the internal auditory canals, and the sella  turcica.    No substantial change in the dural based enhancing mass lesion  situated between the left frontal lobe, temporal lobe, and anterior  left temporal bone.    No substantial change in the dural based enhancing lesion overlying  the left frontal convexity.    Further increase in size of a right frontal convexity dural based  enhancing mass lesion measuring 8 x 11 mm  (series 101 image 122)  previously 5 x 8 mm 6/28/2017 and 3 x 5 mm 6/28/2016.    There is no midline shift, or evidence of intracranial hemorrhage. The  ventricles are proportionate to the cerebral sulci.  No definite abnormality of the skull marrow signal is noted. The major  vascular intracranial flow-voids are present. The visualized portions  of paranasal sinuses, and mastoid air cells are relatively clear. The  orbits are grossly unremarkable.      Impression    Impression:  1. Further increase in size of the small right frontal convexity  meningioma measuring 8 x 11 mm (series 101 image 122) previously 5 x 8  mm 6/28/2017 and 3 x 5 mm 6/28/2016.  2. Unchanged meningiomas centered in the prepontine cistern, left  frontal cerebral convexity, and left temporo frontal area dating back  to 6/28/2016.  3. Stable appearance of the left frontal approach ventriculostomy  catheter.  4. T1 bright signal in the bilateral globus pallidus, question  gadolinium deposition.    I have personally reviewed the examination and initial interpretation  and I agree with the findings.    MIRTHA WATSON MD       Impression:  1. H/o meningioma s/p resection and radiation therapy  2. MRI Brain 6/4/19: showed no change, tumors have not grown, shunt is working    Plan:  1. RTC in 3 months for follow up, or sooner PRN.  2. Obtain genetic testing for Mita and her family.     Total time spent with patient 15 minutes.     This document serves as a record of the services and decisions personally performed and made by Pilo Barrett MD. It was created on his behalf by Damaris joiner trained medical scribe. The creation of this document is based on the provider's statements to the medical scribe.    The documentation recorded by the scribe accurately reflects the services I personally performed and the decisions made by me.      Pilo SOTOMAYOR  Patient Care Team:  Mitchel Aldridge MD as PCP -  General  Susan Short MSW as  (Pediatric Hematology/Oncology)  Dread Smith MD as MD (Neurosurgery)  Oscar Peña MD as MD (Orthopedics)  Mehnaz Weiner APRN CNP as Nurse Practitioner (Nurse Practitioner)  TAIWO MIJARES    Copy to patient  ANA MUÑOZ  [address]  [place] [address]    MR BRAIN W/O & W CONTRAST 6/4/2019 11:32 AM     Provided History: multiple meningiomas; follow-up imaging; Meningioma,  multiple (H); S/P  shunt.  ICD-10: Meningioma, multiple (H); S/P  shunt     Comparison: MRI 12/4/2018, 6/20/2017.     Technique: Multiplanar T1-weighted, axial FLAIR, and susceptibility  images were obtained without intravenous contrast. Following  intravenous gadolinium-based contrast administration, axial  T2-weighted, diffusion, and T1-weighted images (in multiple planes)  were obtained.     Contrast: 8.8ml Gadavist      Findings:  Susceptibility artifact associated with the ventriculostomy valve  degrades multiple images, most pronounced over the left frontal and  parietal regions.     Postsurgical changes of left frontal approach ventriculostomy, with  the catheter tip in the region of the foramen of Yepez. Multiple  homogeneously enhancing dural based extra-axial masses are present  within the prepontine cisterns, right frontoparietal convexity, and  left frontotemporal region. The prepontine cistern mass is unchanged  and encases and narrows the basilar causing basilar artery, extends to  bilateral Meckel's caves and sella turcica. The right cord terminal  cistern meningioma in the right frontoparietal convexity meningiomas  demonstrate reduced diffusion.      There is no midline shift, or evidence of intracranial hemorrhage. The  ventricles are proportionate to the cerebral sulci. Diffusion-weighted  imaging reveals no acute infarction. Susceptibility weighted imaging  reveals scattered foci of hemosiderin staining within the  posterior  fossa and along the right frontal convexity. Flow voids within the  major intracranial vessels are present..     Unchanged subtle T1 hyperintensity bilateral globus pallidus may be  from prior gadolinium deposition.     No abnormality of the skull marrow signal. The visualized portions of  paranasal sinuses, and mastoid air cells are relatively clear. The  orbits are grossly unremarkable.                                                                      Impression:  1. Unchanged multiple enhancing dural base masses, likely representing  meningiomas 2018. Specifically, the meningioma centered in the  prepontine cistern encases the basilar artery causing mild narrowing.  This is also unchanged.  2. Unchanged left frontal approach ventriculostomy catheter tip in the  third ventricle.     I have personally reviewed the examination and initial interpretation  and I agree with the findings.     PAULETTE AMBROSIO MD    Plan:  Return on 3 months with MRI and visit with Dr. Barrett    Thank you for choosing [facility].    It was a pleasure to see you today.      CNS Tumor Team  Pilo Rivas NP APRN  Veronique Snyder RN BSN - Care Coordinator       [facility], 9th Floor - [address] [address]  Scheduling/Appointments: [phone]  Fax: [phone]     Numbers to call:   Monday - Friday, 8:00 am - 5:00 pm:    Office : [phone]    Scheduling/Appointments: [phone]  Nights and weekends:   Call [phone] and ask the  to page the 'Pediatric Heme/Onc fellow on call' if you have an urgent concern that can't wait until the clinic opens.     Scheduling:    [facility], 9th floor [phone]  Infusion Center/Lab: [phone]   Radiology/ [phone]      Services:   [phone]     Veronique Snyder RN, BSN  CNS Tumor Care Coordinator  Office:  [phone]  Pager: [phone]  E-mail: [email or web address]     We encourage you to sign up for [employer] for easy communication with us.  Sign up at the clinic  or go to [email or web address].      Please try the Passport to [facility]) phone application for Virtual Tours, Procedure Preparation, Resources, Preparation for Hospital Stay and the Coloring Board.     Pilo Barrett MD

## 2019-06-05 LAB — MISCELLANEOUS TEST: NORMAL

## 2019-06-05 NOTE — PROGRESS NOTES
"Presenting information: Mita is a 28 year old female with a history of multiple meningiomas. She has been seen previously in the NF clinic and was evaluated by Dr. Barrett today.   Mita was accompanied to her appointment by her mother, Riri, and step-father, Juancho.  I met with the family at the request of Dr. Barrett to obtain a personal and family history, discuss possible genetic contributions to her symptoms, and to obtain informed consent for genetic testing.     Personal History:  Mita has a history of a complex suprasellar and skull base meningioma diagnosed in 2010, bilateral petrosphenoclival meningioma; s/p craniotomy and tumor debulking in 2012; s/p shunt and s/p proton beam radiotherapy. Per Dr Estrada note from 2016, \"The remaining tumor is anterior to the brainstem and completely engulfs the basilar artery and is, therefore, not resectable without unacceptable consequences.  It has been radiated a several years ago in [place].\" Mita has had headaches since the 7th grade. She denies a history of dizziness/tinnitus; she reports taht her right ear is sensitive to loud noises after her surgery. She denies a history of eye concerns or facial weakness. 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 See Dr. Barrett' note for additional details.     Family History: A three generation pedigree was obtained today and scanned into the EMR. The following information is significant:  - Children: no children  - Siblings:  28yo brother with migraines. 23yo sister A&W. Two half brothers living in their 40s with limited contact. No concerns noted for nieces/nephews.   - Maternal:51yo mother with a history of cervical cancer dx30, occasional headaches. No concerns noted for maternal uncle. No maternal cousins. Grandmother with a history of breast cancer dx around age 67. Grandfather with high blood pressure and diabetes.   - Paternal: 65yo father with high blood pressure. Aunt " " from a blood clot in her brain. No concerns noted for additional aunt, uncle, or cousins. Grandmother  in her 70s from \"age.\" Grandfather  at an older age from Alzheimer's disease.   - Ancestry: ; consanguinity was denied.    Discussion:    Meningiomas are tumors that develop in the meninges, which is the protective membrane that covers the brain and spinal cord. Meningiomas may be sporadic or may present as part of a tumor predisposition syndrome like NF2.    Individuals who have an inherited type of multiple meningiomas have a 50% chance of passing this condition on to each of their children. It is inherited in a dominant pattern in families.     The symptoms may be different in different relatives (variable expression) or a very few symptoms (reduced penetrance).     Multiple meningiomas can also be segmental, meaning that it occurs only in one part of the body.    Testing: The genes associated with hereditary meningiomas include NF2, SMARCB1, SMARCE1 and SUFU.   Genetic testing for hereditary meningiomas involves next generation sequencing of these four genes to look for single nucleotide misspellings, as well as deletion/duplication analysis to look for missing or extra chunks of DNA within the gene.  This testing allows for simultaneous analysis of multiple genes associated with particular symptoms or related disorders.  Testing will be done by the [facility] pending insurance authorization.  We discussed possible results from the testing which can include:      1)  Negative/normal - no mutations were identified in the genes that were analyzed.  A negative result reduces the likelihood of a diagnosis, but cannot definitively exclude a diagnosis.      2)  Positive - a mutation(s) was identified that is known to be associated with meningiomas.  In most cases, a clearly positive result would confirm a diagnosis on a molecular level.     3)  Variant of uncertain significance (VUS) " - a change in the DNA sequence of a particular gene was identified, but there is not enough information to determine if the DNA change is disease-causing or benign.  If a variant of uncertain significance is identified, testing of other relatives may be helpful to provide clarification.  In most cases, identification of a VUS does not confirm a diagnosis and does not result in any clinically actionable recommendations.    Mita consented to this testing and blood was drawn and sent to the [facility] for this testing. A hold was placed on this order to await insurance prior authorization. Results are typically available within 4 to 6 weeks after testing is initiated. We will plan to see Mita for follow-up to discuss these results.    Medical Management: If a mutation is identified in a gene associated with hereditary meningiomas, it will be important for Mita  to be followed in our NF clinic and to have imaging as recommended by the physicians in our  clinic. If Mita Higgins is found to have a mutation in one of these genes, this confirms her diagnosis of the condition and would make targeted testing available for other family members suspected of having the condition.     Plan:  1. Testing of NF2, SMARCB1, SMARCE1 and SUFU.   2. Return pending results of genetic testing and per Dr. Barrett's recommendation.   3. Contact information was provided should any questions arise in the future.     Valentina Sim MS, [facility]  Licensed Genetic Counselor  [phone]    Approximate Time Spent in Consultation: 30 minutes

## 2019-06-10 NOTE — PATIENT INSTRUCTIONS
Plan:  Return on 3 months with MRI and visit with Dr. Barrett    Thank you for choosing [facility].    It was a pleasure to see you today.      CNS Tumor Team  Pilo Rivas NP, APRN  Veronique Snyder RN BSN - Care Coordinator       [facility], 9th Floor - [facility]  [address] [place] [address]  Scheduling/Appointments: [phone]  Fax: [phone]     Numbers to call:   Monday - Friday, 8:00 am - 5:00 pm:    Office : [phone]    Scheduling/Appointments: [phone]  Nights and weekends:   Call [phone] and ask the  to page the 'Pediatric Heme/Onc fellow on call' if you have an urgent concern that can't wait until the clinic opens.     Scheduling:    [facility], 9th floor [phone]  Infusion Center/Lab: [phone]   Radiology/ [phone]      Services:   [phone]     Veronique Snyder RN, BSN  CNS Tumor Care Coordinator  Office: [phone]  Pager: [phone]  E-mail: [email or web address]     We encourage you to sign up for [employer] for easy communication with us.  Sign up at the clinic  or go to [email or web address].      Please try the Passport to [facility] ([facility]) phone application for Virtual Tours, Procedure Preparation, Resources, Preparation for Hospital Stay and the Coloring Board.

## 2019-06-27 ENCOUNTER — TELEPHONE (OUTPATIENT)
Dept: CONSULT | Facility: CLINIC | Age: 29
End: 2019-06-27

## 2019-06-27 NOTE — TELEPHONE ENCOUNTER
I called 06/27/19 to update Mita on insurance coverage for her genetic testing (approval was received). However, I was unable to reach Mita. I left a non-detailed voicemail with my name and phone number.  Mary Barnes    Division of Genetics  [facility]  P: [phone]

## 2019-07-01 NOTE — TELEPHONE ENCOUNTER
Notified Mita that we received prior authorization approval for genetic testing. Valid from 06/04/19 to 06/04/19. Authorization number is [id number].     Explained that insurance benefits may still apply, therefore, there could be an out of pocket cost. Provided Mita with estimated out of pocket cost for testing.     Mita expressed understanding and stated that she wants to proceed with testing. We will call when results are available. Mita had no further questions.    Mary Barnes    Division of Genetics  [facility]  P: [phone]

## 2019-07-09 LAB — LAB SCANNED RESULT: NORMAL

## 2019-07-24 DIAGNOSIS — R51.9 CHRONIC DAILY HEADACHE: ICD-10-CM

## 2019-07-24 NOTE — TELEPHONE ENCOUNTER
Last Written Prescription Date:  05/24/2019  Last Fill Quantity: 30,  # refills: 6   Last office visit: Previous visit found with prescribing provider:  06/04/2019   Future Office Visit:  09/03/19

## 2019-07-29 RX ORDER — TOPIRAMATE 25 MG/1
TABLET, FILM COATED ORAL
Qty: 60 TABLET | Refills: 3 | OUTPATIENT
Start: 2019-07-29

## 2019-08-28 ENCOUNTER — TELEPHONE (OUTPATIENT)
Dept: CONSULT | Facility: CLINIC | Age: 29
End: 2019-08-28

## 2019-08-28 NOTE — TELEPHONE ENCOUNTER
"Mita returned my call and we reviewed the information below. Mita wanted to know if it was a good or bad thing that her testing was negative. We discussed that you could look at it as both \"good\" and \"bad.\" On the good side, this makes it less likely that Mita's meningioma was due to a genetic reason. If she had been positive, she may have been at an increased risk to develop other tumors due to these results and we may have needed to do more screenings/tests to keep her healthy than we already do because of her personal history. It also makes us think that her family members are less likely to have a meningioma due to Mita's history. Some people think that this result would be bad because it does not give us an explanation for why Mita had her meningioma. We discussed that it is ok to be happy and sad at these results, and that this information can be confusing. We can talk more about this when Mita returns to clinic as well, so that she has time to see if she has more questions and to talk to her mom about this too.     Mita notes that she has been trying to reach the NF clinic to reschedule her upcoming appointment next week. Her mother is unable to bring her to her appointment and the family would like to reschedule. I will copy the care coordinator for the NF clinic and our  to this message.    Mita took my contact information and was asked to call me if she has questions or concerns before her follow-up appointment.    Valentina Sim  Licensed Genetic Counselor  [phone]    ---------------------------------------------------  I attempted to contact Mita to discuss the results of her recent genetic testing at the [facility]. I left her a voicemail asking her to return my phone call. When she returns my phone call I will review the following:    Results:  - Genetic testing for a panel of genes (NF2, SMARCB1, SMARCE1 and SUFU) related to a genetic predisposition for " "meningiomas via next generation sequencing at the [facility] was NEGATIVE. No pathogenic variants, likely pathogenic variants or variants of uncertain significance were detected.   - A copy of these results is available in the laboratory section dated 06/04/2019 labeled \"SEND OUTS MISC TEST.\"     Discussion:  - Testing did not identify a known pathogenic change in any of the genes tested. This reduces the likelihood that Mita's meningiomas are due to a known genetic predisposition syndrome. However, we cannot completely rule out a genetic cause due to several reasons:   1. One explanation may be that there is a different gene or combination of genes and environment that are associated with the meningiomas in Mita that were not tested.  2. There is also a small possibility that there is a mutation in one of these genes, and we could not find it with our current testing methods.     3. It is possible that Mita could have a mutation in one of these genes that is only in parts of her body (mosaicism or segmental).     Plan:  - Mita has a follow-up appointment scheduled with Dr. Barrett on Tuesday September 4. We will meet at that time to review results again and answer any questions the family has.    Valentina Sim  Licensed Genetic Counselor  [phone]    "

## 2019-09-10 ENCOUNTER — OFFICE VISIT (OUTPATIENT)
Dept: PEDIATRIC HEMATOLOGY/ONCOLOGY | Facility: CLINIC | Age: 29
End: 2019-09-10
Attending: PEDIATRICS
Payer: COMMERCIAL

## 2019-09-10 ENCOUNTER — HOSPITAL ENCOUNTER (OUTPATIENT)
Dept: MRI IMAGING | Facility: CLINIC | Age: 29
Discharge: HOME | End: 2019-09-10
Attending: PEDIATRICS | Admitting: PEDIATRICS
Payer: COMMERCIAL

## 2019-09-10 VITALS
RESPIRATION RATE: 18 BRPM | WEIGHT: 199.74 LBS | SYSTOLIC BLOOD PRESSURE: 124 MMHG | HEIGHT: 66 IN | OXYGEN SATURATION: 100 % | TEMPERATURE: 97.6 F | BODY MASS INDEX: 32.1 KG/M2 | DIASTOLIC BLOOD PRESSURE: 72 MMHG | HEART RATE: 84 BPM

## 2019-09-10 DIAGNOSIS — D42.9 MENINGIOMA, MULTIPLE (H): ICD-10-CM

## 2019-09-10 DIAGNOSIS — D42.9 MENINGIOMA, MULTIPLE (H): Primary | ICD-10-CM

## 2019-09-10 PROCEDURE — 25500064 ZZH RX 255 OP 636: Performed by: PEDIATRICS

## 2019-09-10 PROCEDURE — A9585 GADOBUTROL INJECTION: HCPCS | Performed by: PEDIATRICS

## 2019-09-10 PROCEDURE — 70553 MRI BRAIN STEM W/O & W/DYE: CPT

## 2019-09-10 RX ORDER — GADOBUTROL 604.72 MG/ML
10 INJECTION INTRAVENOUS ONCE
Status: COMPLETED | OUTPATIENT
Start: 2019-09-10 | End: 2019-09-10

## 2019-09-10 RX ADMIN — GADOBUTROL 9 ML: 604.72 INJECTION INTRAVENOUS at 14:58

## 2019-09-10 ASSESSMENT — PAIN SCALES - GENERAL: PAINLEVEL: NO PAIN (0)

## 2019-09-10 ASSESSMENT — ENCOUNTER SYMPTOMS
HEADACHES: 1
TROUBLE SWALLOWING: 0

## 2019-09-10 ASSESSMENT — MIFFLIN-ST. JEOR: SCORE: 1657.5

## 2019-09-10 NOTE — PROGRESS NOTES
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    Mita continues to have pretty consistent headaches. Her mother reports that Mita has been kohler recently as well.     Fam/Soc: Mita enjoys horseback riding with her mom.    History was obtained from Mita, her mother, and her stepfather.     Allergies   Allergen Reactions     Nka [No Known Allergies]      Nkda [No Known Drug Allergies]      Seasonal Allergies      Current Outpatient Medications   Medication     acetaminophen (TYLENOL) 325 MG tablet     atomoxetine (STRATTERA) 40 MG capsule     cholecalciferol (VITAMIN D) 1000 UNIT tablet     medroxyPROGESTERone (PROVERA) 10 MG tablet     ranitidine (ZANTAC) 150 MG tablet     topiramate (TOPAMAX) 25 MG tablet     No current facility-administered medications for this visit.      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)      S/P  shunt      Past Surgical History:   Procedure Laterality Date     ARTHROPLASTY HIP  7/30/2013    Procedure: ARTHROPLASTY HIP;  Left Total Hip Replacement ;  Surgeon: Oscar Peña MD;  [facility]     ARTHROPLASTY HIP Right 12/19/2014    Procedure: ARTHROPLASTY HIP;  Surgeon: Oscar Peña MD;  [facility]     BIOPSY  10/2010    brain tumor     ENT SURGERY      T & A     IMPLANT SHUNT VENTRICULOPERITONEAL  4/5/2012    Procedure:IMPLANT SHUNT VENTRICULOPERITONEAL; Left Ventriculo-Peritoneal Shunt Placement with Stealth Axiom; Surgeon:DWIGHT HOPKINS; [facility]     OPTICAL TRACKING SYSTEM CRANIOTOMY, EXCISE TUMOR, COMBINED  4/12/2012     "Procedure:COMBINED OPTICAL TRACKING SYSTEM CRANIOTOMY, EXCISE TUMOR; Stealth Assisted Suboccipital Craniotomy and Supracerabellar Infratentorial Approach to Brain Tumor; Surgeon:KEN VAUGHN; [facility]     No family history on file.    Review of Systems   HENT: Negative for trouble swallowing.         Getting shunt valve reprogrammed today.   Neurological: Positive for headaches.     /72 (BP Location: Right arm, Patient Position: Sitting, Cuff Size: Adult Large)   Pulse 84   Temp 97.6  F (36.4  C) (Oral)   Resp 18   Ht 1.684 m (5' 6.3\")   Wt 90.6 kg (199 lb 11.8 oz)   SpO2 100%   BMI 31.95 kg/m      Physical Exam   Constitutional: She is oriented to person, place, and time. She is cooperative.   HENT:   Mouth/Throat: Oropharynx is clear and moist. Normal dentition.   Eyes: Pupils are equal, round, and reactive to light. Conjunctivae and EOM are normal.   Neurological: She is alert and oriented to person, place, and time. She has normal strength. No sensory deficit. She displays a negative Romberg sign.   Skin: Skin is warm and dry.   Psychiatric: She has a normal mood and affect. Her behavior is normal.     MR BRAIN W/O & W CONTRAST 9/10/2019 3:42 PM     Provided History: Hisotry of multiple meningiomas, s/p radiation;  Meningioma, multiple (H).  ICD-10: Meningioma, multiple (H)     Comparison: Brain MRI 6/4/2019 and 12/4/2018.     Technique: Multiplanar T1-weighted, axial FLAIR, and susceptibility  images were obtained without intravenous contrast. Following  intravenous gadolinium-based contrast administration, axial  T2-weighted, diffusion, and T1-weighted images (in multiple planes)  were obtained.     Contrast: 10 cc Gadavist     Findings:  Shunt reservoir related susceptibility artifact limits the evaluation  of the left cerebral hemisphere.     No significant change in dural-based enhancing meningiomas. The  largest is in the prepontine cistern and encases the basilar " artery  without narrowing the artery. It appears to extend into the Meckel's  cave. Additional meningiomas in the left temporal and right  frontoparietal lobes, unchanged.      No extra-axial collection or midline shift. Left frontal approach  shunt catheter tip is in the third ventricle. Ventricles are not  enlarged. Patent major intracranial vascular flow-voids.     No abnormality of the skull marrow signal. The visualized portions of  paranasal sinuses, and mastoid air cells are relatively clear. The  orbits are grossly unremarkable.                                                                      Impression:No significant change dating back to 12/4/2018.  1. Multiple meningiomas involving the basilar cisterns, left temporal  and right frontoparietal regions.  2. Stable positioning of left frontal approach shunt catheter. No  hydrocephalus.     CIARA NEGRO MD    Impression:  1. History of meningioma status post subtotal resection and radiation therapy  2. 9/10/19 Brain MRI: most of the tumor is stable.    Plan:  1. RTC in 3 months for follow up, or sooner PRN.  2. Continue to monitor for changes in how Mita's face moves. If there is a change, this could indicate a growth of the tumor near her cranial nerve. At this time, we would want to do a biopsy.      Time spent with patient 25 minutes. Over 50% of the visit was spent counseling the patient, her mother, and her step father on Brain MRI results and treatment plan.    This document serves as a record of the services and decisions personally performed and made by Pilo Barrett MD. It was created on his behalf by Valentina joiner trained medical scribe. The creation of this document is based on the provider's statements to the medical scribe.    The documentation recorded by the scribe accurately reflects the services I personally performed and the decisions made by me.    Pilo Barrett    CC  Patient Care Team:  Luis Carlos  Mitchel GUERRERO MD as PCP - General  Susan Short MSW as  (Pediatric Hematology/Oncology)  Dread Smith MD as MD (Neurosurgery)  Oscar Peña MD as MD (Orthopedics)  Mehnaz Weiner APRN CNP as Nurse Practitioner (Nurse Practitioner)  MITCHEL MIJARES    Copy to patient  ANA MUÑOZ  [address] [facility] [address]  [facility] [address]    Plan:  Return in 3 months for MRI and visit with Dr. Barrett    Thank you for choosing [facility].    It was a pleasure to see you today.      CNS Tumor Team  Pilo Rivas NP APRN  Veronique Snyder RN BSN - Care Coordinator       [facility], 9th Floor - [address] [address]  Scheduling/Appointments: [phone]  Fax: [phone]     Numbers to call:   Monday - Friday, 8:00 am - 5:00 pm:    Office : [phone]    Scheduling/Appointments: [phone]  Nights and weekends:   Call [phone] and ask the  to page the 'Pediatric Heme/Onc fellow on call' if you have an urgent concern that can't wait until the clinic opens.     Scheduling:    [facility], 9th floor [phone]  Infusion Center/Lab: [phone]   Radiology/ [phone]      Services:   [phone]     Veronique Snyder RN, BSN  CNS Tumor Care Coordinator  Office: [phone]  Pager: [phone]  E-mail: [email or web address]     We encourage you to sign up for [employer] for easy communication with us.  Sign up at the clinic  or go to [email or web address].      Please try the Passport to [facility] ([facility]) phone application for Virtual Tours, Procedure Preparation, Resources, Preparation for Hospital Stay and the Coloring Board.

## 2019-09-10 NOTE — PATIENT INSTRUCTIONS
Plan:  Return in 3 months for MRI and visit with Dr. Barrett    Thank you for choosing [facility].    It was a pleasure to see you today.      CNS Tumor Team  Pilo Rivas NP, APRN  Veronique Snyder RN BSN - Care Coordinator       [facility], 9th Floor - [facility]  [address] [place] [address]  Scheduling/Appointments: [phone]  Fax: [phone]     Numbers to call:   Monday - Friday, 8:00 am - 5:00 pm:    Office : [phone]    Scheduling/Appointments: [phone]  Nights and weekends:   Call [phone] and ask the  to page the 'Pediatric Heme/Onc fellow on call' if you have an urgent concern that can't wait until the clinic opens.     Scheduling:    [facility], 9th floor [phone]  Infusion Center/Lab: [phone]   Radiology/ [phone]      Services:   [phone]     Veronique Snyder RN, BSN  CNS Tumor Care Coordinator  Office: [phone]  Pager: [phone]  E-mail: [email or web address]     We encourage you to sign up for [employer] for easy communication with us.  Sign up at the clinic  or go to [email or web address].      Please try the Passport to [facility] ([facility]) phone application for Virtual Tours, Procedure Preparation, Resources, Preparation for Hospital Stay and the Coloring Board.

## 2019-09-10 NOTE — LETTER
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    Mita continues to have pretty consistent headaches. Her mother reports that Mita has been kohler recently as well.     Fam/Soc: Mita enjoys horseback riding with her mom.    History was obtained from Mita, her mother, and her stepfather.     Allergies   Allergen Reactions     Nka [No Known Allergies]      Nkda [No Known Drug Allergies]      Seasonal Allergies      Current Outpatient Medications   Medication     acetaminophen (TYLENOL) 325 MG tablet     atomoxetine (STRATTERA) 40 MG capsule     cholecalciferol (VITAMIN D) 1000 UNIT tablet     medroxyPROGESTERone (PROVERA) 10 MG tablet     ranitidine (ZANTAC) 150 MG tablet     topiramate (TOPAMAX) 25 MG tablet     No current facility-administered medications for this visit.      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)      S/P  shunt      Past Surgical History:   Procedure Laterality Date     ARTHROPLASTY HIP  7/30/2013    Procedure: ARTHROPLASTY HIP;  Left Total Hip Replacement ;  Surgeon: Oscar Peña MD;  [facility]     ARTHROPLASTY HIP Right 12/19/2014    Procedure: ARTHROPLASTY HIP;  Surgeon: Oscar Peña MD;  [facility]     BIOPSY  10/2010    brain tumor     ENT SURGERY      T & A     IMPLANT SHUNT VENTRICULOPERITONEAL  4/5/2012    Procedure:IMPLANT SHUNT VENTRICULOPERITONEAL; Left Ventriculo-Peritoneal Shunt Placement with Stealth Axiom; Surgeon:DWIGHT HOPKINS; Location:  "OR     OPTICAL TRACKING SYSTEM CRANIOTOMY, EXCISE TUMOR, COMBINED  4/12/2012    Procedure:COMBINED OPTICAL TRACKING SYSTEM CRANIOTOMY, EXCISE TUMOR; Stealth Assisted Suboccipital Craniotomy and Supracerabellar Infratentorial Approach to Brain Tumor; Surgeon:KEN VAUGHN; Location: OR     No family history on file.    Review of Systems   HENT: Negative for trouble swallowing.         Getting shunt valve reprogrammed today.   Neurological: Positive for headaches.     /72 (BP Location: Right arm, Patient Position: Sitting, Cuff Size: Adult Large)   Pulse 84   Temp 97.6  F (36.4  C) (Oral)   Resp 18   Ht 1.684 m (5' 6.3\")   Wt 90.6 kg (199 lb 11.8 oz)   SpO2 100%   BMI 31.95 kg/m       Physical Exam   Constitutional: She is oriented to person, place, and time. She is cooperative.   HENT:   Mouth/Throat: Oropharynx is clear and moist. Normal dentition.   Eyes: Pupils are equal, round, and reactive to light. Conjunctivae and EOM are normal.   Neurological: She is alert and oriented to person, place, and time. She has normal strength. No sensory deficit. She displays a negative Romberg sign.   Skin: Skin is warm and dry.   Psychiatric: She has a normal mood and affect. Her behavior is normal.     MR BRAIN W/O & W CONTRAST 9/10/2019 3:42 PM     Provided History: Hisotry of multiple meningiomas, s/p radiation;  Meningioma, multiple (H).  ICD-10: Meningioma, multiple (H)     Comparison: Brain MRI 6/4/2019 and 12/4/2018.     Technique: Multiplanar T1-weighted, axial FLAIR, and susceptibility  images were obtained without intravenous contrast. Following  intravenous gadolinium-based contrast administration, axial  T2-weighted, diffusion, and T1-weighted images (in multiple planes)  were obtained.     Contrast: 10 cc Gadavist     Findings:  Shunt reservoir related susceptibility artifact limits the evaluation  of the left cerebral hemisphere.     No significant change in dural-based enhancing " meningiomas. The  largest is in the prepontine cistern and encases the basilar artery  without narrowing the artery. It appears to extend into the Meckel's  cave. Additional meningiomas in the left temporal and right  frontoparietal lobes, unchanged.      No extra-axial collection or midline shift. Left frontal approach  shunt catheter tip is in the third ventricle. Ventricles are not  enlarged. Patent major intracranial vascular flow-voids.     No abnormality of the skull marrow signal. The visualized portions of  paranasal sinuses, and mastoid air cells are relatively clear. The  orbits are grossly unremarkable.                                                                      Impression:No significant change dating back to 12/4/2018.  1. Multiple meningiomas involving the basilar cisterns, left temporal  and right frontoparietal regions.  2. Stable positioning of left frontal approach shunt catheter. No  hydrocephalus.     CIARA NEGRO MD    Impression:  1. H istory of meningioma status post subtotal resection and radiation therapy  2. 9/10/19 Brain MRI: most of the tumor is stable.    Plan:  1. RTC in 3 months for follow up, or sooner PRN.  2. Continue to monitor for changes in how Mita's face moves. If there is a change, this could indicate a growth of the tumor near her cranial nerve. At this time, we would want to do a biopsy.      Time spent with patient 25 minutes. Over 50% of the visit was spent counseling the patient, her mother, and her step father on Brain MRI results and treatment plan.    This document serves as a record of the services and decisions personally performed and made by Pilo Barrett MD. It was created on his behalf by Valentina Simon a trained medical scribe. The creation of this document is based on the provider's statements to the medical scribe.    The documentation recorded by the scribe accurately reflects the services I personally performed and the decisions  made by me.    Pilo Barrett    CC  Patient Care Team:  Mitchel Aldridge MD as PCP - General  Susan Shotr MSW as  (Pediatric Hematology/Oncology)  Dread Smith MD as MD (Neurosurgery)  Oscar Peña MD as MD (Orthopedics)  Mehnaz Weiner APRN CNP as Nurse Practitioner (Nurse Practitioner)      Copy to patient  ANA MUÑOZ  [address]  [facility] [address]    Plan:  Return in 3 months for MRI and visit with Dr. Barrett    Thank you for choosing [facility].    It was a pleasure to see you today.      CNS Tumor Team  Pilo Rivas NP APRN  Veronique Snyder RN BSN - Care Coordinator       [facility], 9th Floor - [address] [address]  Scheduling/Appointments: [phone]  Fax: [phone]     Numbers to call:   Monday - Friday, 8:00 am - 5:00 pm:    Office : [phone]    Scheduling/Appointments: [phone]  Nights and weekends:   Call [phone] and ask the  to page the 'Pediatric Heme/Onc fellow on call' if you have an urgent concern that can't wait until the clinic opens.     Scheduling:    [facility], 9th floor [phone]  Infusion Center/Lab: [phone]   Radiology/ [phone]      Services:   [phone]     Veronique Snyder RN, BSN  CNS Tumor Care Coordinator  Office: [phone]  Pager: [phone]  E-mail: [email or web address]     We encourage you to sign up for [employer] for easy communication with us.  Sign up at the clinic  or go to [email or web address].      Please try the Passport to [facility] ([facility]) phone application for Virtual Tours, Procedure Preparation, Resources, Preparation for Hospital Stay and the Coloring Board.     Pilo Barrett MD

## 2019-09-12 NOTE — PROCEDURES
NP at bedside to reprogram shunt following MRI.  Procedure explained, questions answered.  Using the [employer] , shunt valve was interrogated and found to be at 2.5.  Previous setting was 2.0.  Using the [employer] magnet, the shunt valve was dialed down to 2.0.  This was confirmed x 3.  Pt tolerated procedure well.

## 2019-12-10 ENCOUNTER — HOSPITAL ENCOUNTER (OUTPATIENT)
Dept: MRI IMAGING | Facility: CLINIC | Age: 29
Discharge: HOME | End: 2019-12-10
Attending: PEDIATRICS | Admitting: PEDIATRICS
Payer: COMMERCIAL

## 2019-12-10 ENCOUNTER — OFFICE VISIT (OUTPATIENT)
Dept: PEDIATRIC HEMATOLOGY/ONCOLOGY | Facility: CLINIC | Age: 29
End: 2019-12-10
Attending: PEDIATRICS
Payer: COMMERCIAL

## 2019-12-10 VITALS
OXYGEN SATURATION: 99 % | SYSTOLIC BLOOD PRESSURE: 124 MMHG | RESPIRATION RATE: 18 BRPM | TEMPERATURE: 98.4 F | WEIGHT: 199.74 LBS | DIASTOLIC BLOOD PRESSURE: 80 MMHG | HEART RATE: 106 BPM | BODY MASS INDEX: 31.35 KG/M2 | HEIGHT: 67 IN

## 2019-12-10 DIAGNOSIS — D42.9 MENINGIOMA, MULTIPLE (H): ICD-10-CM

## 2019-12-10 DIAGNOSIS — D42.9 MENINGIOMA, MULTIPLE (H): Primary | ICD-10-CM

## 2019-12-10 PROCEDURE — G0463 HOSPITAL OUTPT CLINIC VISIT: HCPCS | Mod: 25

## 2019-12-10 PROCEDURE — 62252 CSF SHUNT REPROGRAM: CPT

## 2019-12-10 PROCEDURE — A9585 GADOBUTROL INJECTION: HCPCS | Performed by: PEDIATRICS

## 2019-12-10 PROCEDURE — 25500064 ZZH RX 255 OP 636: Performed by: PEDIATRICS

## 2019-12-10 PROCEDURE — 70553 MRI BRAIN STEM W/O & W/DYE: CPT

## 2019-12-10 RX ORDER — GADOBUTROL 604.72 MG/ML
10 INJECTION INTRAVENOUS ONCE
Status: COMPLETED | OUTPATIENT
Start: 2019-12-10 | End: 2019-12-10

## 2019-12-10 RX ADMIN — GADOBUTROL 9 ML: 604.72 INJECTION INTRAVENOUS at 13:53

## 2019-12-10 ASSESSMENT — ENCOUNTER SYMPTOMS
SHORTNESS OF BREATH: 0
COUGH: 1
NUMBNESS: 0
NAUSEA: 1
BACK PAIN: 0
VOMITING: 0
MYALGIAS: 0
TROUBLE SWALLOWING: 0
FEVER: 0
DIARRHEA: 1
ARTHRALGIAS: 0
NECK PAIN: 0

## 2019-12-10 ASSESSMENT — MIFFLIN-ST. JEOR: SCORE: 1659.37

## 2019-12-10 ASSESSMENT — PAIN SCALES - GENERAL: PAINLEVEL: NO PAIN (0)

## 2019-12-10 NOTE — PROCEDURES
NP at bedside to reprogram shunt following MRI.  Procedure explained, questions answered.  Using the [employer] , shunt valve was interrogated and found to be at 1.0.  Previous setting was 2.0.  Using the [employer] magnet, the shunt valve was dialed up to 2.0.  This was confirmed x 3.  Pt tolerated procedure well.

## 2019-12-10 NOTE — NURSING NOTE
"Chief Complaint   Patient presents with     RECHECK     Patient here today for  Meningioma     /80 (BP Location: Right arm, Patient Position: Sitting, Cuff Size: Adult Large)   Pulse 106   Temp 98.4  F (36.9  C) (Oral)   Resp 18   Ht 1.695 m (5' 6.73\")   Wt 90.6 kg (199 lb 11.8 oz)   SpO2 99%   BMI 31.53 kg/m    Dalila Yepez, [facility]   December 10, 2019  "

## 2019-12-10 NOTE — LETTER
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    Since our last visit, things have remained stable with Mita. She still has headaches, but she denies experiencing any double vision with the headaches. Her headaches are worsened with loud noises. The pain is alleviated with ibuprofen.     Fam/Soc: Mita spends her days watching TV and going to basketball and volleyball games. She is not excited for the winter and cold. Mita still lives alone in her own apartment. She is currently applying for jobs.    History was obtained from Mita, her mom, and her stepdad.    Allergies   Allergen Reactions     Nka [No Known Allergies]      Nkda [No Known Drug Allergies]      Seasonal Allergies      Current Outpatient Medications   Medication     acetaminophen (TYLENOL) 325 MG tablet     atomoxetine (STRATTERA) 40 MG capsule     ranitidine (ZANTAC) 150 MG tablet     topiramate (TOPAMAX) 25 MG tablet     cholecalciferol (VITAMIN D) 1000 UNIT tablet     medroxyPROGESTERone (PROVERA) 10 MG tablet     No current facility-administered medications for this visit.      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)      S/P  shunt      Past Surgical History:   Procedure Laterality Date     ARTHROPLASTY HIP  7/30/2013    Procedure: ARTHROPLASTY HIP;  Left Total Hip Replacement ;  Surgeon: Oscar Peña MD;  [facility]: [facility]     ARTHROPLASTY HIP Right 12/19/2014    Procedure: ARTHROPLASTY HIP;  Surgeon: Oscar Peña MD;  Location: [facility]  "    BIOPSY  10/2010    brain tumor     ENT SURGERY      T & A     IMPLANT SHUNT VENTRICULOPERITONEAL  4/5/2012    Procedure:IMPLANT SHUNT VENTRICULOPERITONEAL; Left Ventriculo-Peritoneal Shunt Placement with Stealth Axiom; Surgeon:DWIGHT HOPKINS; Location: OR     OPTICAL TRACKING SYSTEM CRANIOTOMY, EXCISE TUMOR, COMBINED  4/12/2012    Procedure:COMBINED OPTICAL TRACKING SYSTEM CRANIOTOMY, EXCISE TUMOR; Stealth Assisted Suboccipital Craniotomy and Supracerabellar Infratentorial Approach to Brain Tumor; Surgeon:KEN VAUGHN; Location: OR     No family history on file.    Review of Systems   Constitutional: Negative for fever.   HENT: Negative for hearing loss and trouble swallowing.    Eyes:        Vision is stable. Needs glasses for driving.   Respiratory: Positive for cough (Getting over a cold.). Negative for shortness of breath.    Gastrointestinal: Positive for diarrhea and nausea. Negative for vomiting.        Flu going around, almost over it.   Musculoskeletal: Negative for arthralgias, back pain, myalgias and neck pain.   Skin: Negative for rash.        No lumps or bumps.   Neurological: Negative for numbness.        No tingling.   All other systems reviewed and are negative.    /80 (BP Location: Right arm, Patient Position: Sitting, Cuff Size: Adult Large)   Pulse 106   Temp 98.4  F (36.9  C) (Oral)   Resp 18   Ht 1.695 m (5' 6.73\")   Wt 90.6 kg (199 lb 11.8 oz)   SpO2 99%   BMI 31.53 kg/m       Physical Exam  HENT:      Right Ear: Ear canal normal.      Left Ear: Tympanic membrane and ear canal normal.      Ears:      Comments: Cerumen in both ears, R>L.     Mouth/Throat:      Mouth: Mucous membranes are moist.      Pharynx: Oropharynx is clear.   Eyes:      Extraocular Movements: Extraocular movements intact.      Conjunctiva/sclera: Conjunctivae normal.      Pupils: Pupils are equal, round, and reactive to light.   Cardiovascular:      Rate and Rhythm: Normal rate and " regular rhythm.      Heart sounds: Normal heart sounds.   Pulmonary:      Effort: Pulmonary effort is normal.      Breath sounds: Normal breath sounds.   Abdominal:      General: Bowel sounds are normal.      Palpations: There is no mass.      Tenderness: There is no abdominal tenderness.   Lymphadenopathy:      Head:      Right side of head: No submental or submandibular adenopathy.      Left side of head: No submental or submandibular adenopathy.      Cervical: No cervical adenopathy.   Neurological:      Mental Status: She is alert and oriented to person, place, and time.      Sensory: Sensation is intact.      Motor: Motor function is intact. No weakness.      Deep Tendon Reflexes: Reflexes are normal and symmetric. Reflexes normal.   Psychiatric:         Behavior: Behavior is cooperative.       MR BRAIN W/O & W CONTRAST 12/10/2019 2:28 PM     Provided History: Meningioma, multiple (H).28 year old female with bilateral petrosphenoclival meningioma?s/p hydroxyurea and XRT?     ICD-10: Meningioma, multiple (H)     Comparison: 12/10/2018.     Technique: Multiplanar T1-weighted, axial FLAIR, and susceptibility images were obtained without intravenous contrast. Following intravenous gadolinium-based contrast administration, axial T2-weighted, diffusion, and T1-weighted images (in multiple planes) were obtained.     Contrast dose: 9 mL Gadavist     Findings: Left frontal approach shunt catheter with the tip of the catheter extending to the foramen of Monro. Susceptibility artifacts associated with shunt reservoir. Mild patchy meningeal enhancement likely from chronic shunting. No change in the caliber of the ventricular system. Multiple dural based presumed meningiomas particularly at the level of  basal cisterns. Grossly compared to most recent MRI from 9/10/2019, there is no significant change in the size, number and appearance of these multiple presumed meningiomas. Largest lesion is in continuation in the basal  cistern level. Due to its complex shape, and exact measurement cannot be obtained. Unchanged encasement of the basilar artery, but the largest meningioma, which is retroclival and also displacing the jessica. Extension into bilateral cerebellopontine angle and left greater than right encasement of 7th and 8th cranial nerves at the internal acoustic canal. These lesions extend into Meckel's cave along the 5th cranial nerve and encases both optic nerves at the entrance of the optic canal. In addition, these lesions are protruding into the cavernous sinuses bilaterally along the multiple cranial nerves in the prepontine cistern level. Involvement of the inferior medial portion of the right supraclinoid internal carotid artery and inferior aspect of the left supraclinoid internal carotid artery. No significant change in the size of left frontal temporal 2.6 x 1.1 cm (based on coronal images) and right frontal 1.5 x 0.9 cm meningiomas.     Intraorbital optic nerves are grossly normal. Normal orbits. Clear paranasal sinuses. Clear mastoid air cells.                                                                    Impression:     1. Stable multiple meningiomas predominantly involving the basal cisterns as described in great detail above. Again there is encasement of cisternal components of multiple cranial nerves as well as the basilar artery and abutment of bilateral supraclinoid internal carotid arteries.  2. The largest retroclival meningioma can use to displace the jessica posteriorly, but grossly unchanged.  3. No significant change in size of ventricles and no evident hydrocephalus in a patient with a left frontal ventriculostomy.     I have personally reviewed the examination and initial interpretation and I agree with the findings.     MIRTHA WATSON MD    Impression:  1. History of meningioma status post subtotal resection and radiation therapy  2. 12/10/19 Brain MRI: stable    Plan:   1. RTC in 9-10 months for  follow up with brain MRI, or sooner PRN.    Patient seen and discussed with Dr. Nena Chris MD  Internal Medicine- Pediatrics PGY2    Physician Attestation   I, Pilo Barrett MD, saw this patient with the resident and agree with the resident/fellow's findings and plan of care as documented in the note.      I personally reviewed vital signs, medications and imaging.    Key findings: I agree with the assessment as noted    Pilo Barrett MD  Date of Service (when I saw the patient): 12/10/19      Time spent with patient 25 minutes. Over 50% of the visit was spent counseling the patient, her mom, and her stepdad on Brain MRI results and treatment plan.    This document serves as a record of the services and decisions personally performed and made by Pilo Barrett MD. It was created on his behalf by Valentina joiner trained medical scribe. The creation of this document is based on the provider's statements to the medical scribe.    The documentation recorded by the scribe accurately reflects the services I personally performed and the decisions made by me.    Pilo Barrett      Patient Care Team:  Mitchel Aldridge MD as PCP - General  Susan Short MSW as  (Pediatric Hematology/Oncology)  Dread Smith MD as MD (Neurosurgery)  Oscar Peña MD as MD (Orthopedics)  Mehnaz Weiner APRN CNP as Nurse Practitioner (Nurse Practitioner)  MITCHEL ALDRIDGE    Copy to patient  ANA MUÑOZ  [address] [place] [id number]

## 2019-12-10 NOTE — PROGRESS NOTES
Pediatric Hematology/Oncology Clinic Note     HPI-  Mita Higgins is a 29 year old female with bilateral petrosphenoclival meningioma status post hydroxyurea and XRT who presents to the clinic with her mom and stepdad for a follow up and review of Brain MRI results.    Since our last visit, things have remained stable with Mita. She still has headaches, but she denies experiencing any double vision with the headaches. Her headaches are worsened with loud noises. The pain is alleviated with ibuprofen.     Fam/Soc: Mita spends her days watching TV and going to basketball and volleyball games. She is not excited for the winter and cold. Mita still lives alone in her own apartment. She is currently applying for jobs.    History was obtained from Mita, her mom, and her steprodgerd.    Allergies   Allergen Reactions     Nka [No Known Allergies]      Nkda [No Known Drug Allergies]      Seasonal Allergies      Current Outpatient Medications   Medication     acetaminophen (TYLENOL) 325 MG tablet     atomoxetine (STRATTERA) 40 MG capsule     ranitidine (ZANTAC) 150 MG tablet     topiramate (TOPAMAX) 25 MG tablet     cholecalciferol (VITAMIN D) 1000 UNIT tablet     medroxyPROGESTERone (PROVERA) 10 MG tablet     No current facility-administered medications for this visit.      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)      S/P  shunt      Past Surgical History:   Procedure Laterality Date     ARTHROPLASTY HIP  7/30/2013    Procedure: ARTHROPLASTY HIP;  Left Total Hip Replacement ;  Surgeon: Oscar Peña MD;  [facility]     ARTHROPLASTY HIP Right 12/19/2014    Procedure: ARTHROPLASTY HIP;  Surgeon: Oscar Peña MD;  [facility]     BIOPSY  10/2010    brain tumor     ENT SURGERY      T & A     IMPLANT  "SHUNT VENTRICULOPERITONEAL  4/5/2012    Procedure:IMPLANT SHUNT VENTRICULOPERITONEAL; Left Ventriculo-Peritoneal Shunt Placement with Stealth Axiom; Surgeon:DWIGHT HOPKINS; Location: OR     OPTICAL TRACKING SYSTEM CRANIOTOMY, EXCISE TUMOR, COMBINED  4/12/2012    Procedure:COMBINED OPTICAL TRACKING SYSTEM CRANIOTOMY, EXCISE TUMOR; Stealth Assisted Suboccipital Craniotomy and Supracerabellar Infratentorial Approach to Brain Tumor; Surgeon:KEN VAUGHN; Location: OR     No family history on file.    Review of Systems   Constitutional: Negative for fever.   HENT: Negative for hearing loss and trouble swallowing.    Eyes:        Vision is stable. Needs glasses for driving.   Respiratory: Positive for cough (Getting over a cold.). Negative for shortness of breath.    Gastrointestinal: Positive for diarrhea and nausea. Negative for vomiting.        Flu going around, almost over it.   Musculoskeletal: Negative for arthralgias, back pain, myalgias and neck pain.   Skin: Negative for rash.        No lumps or bumps.   Neurological: Negative for numbness.        No tingling.   All other systems reviewed and are negative.    /80 (BP Location: Right arm, Patient Position: Sitting, Cuff Size: Adult Large)   Pulse 106   Temp 98.4  F (36.9  C) (Oral)   Resp 18   Ht 1.695 m (5' 6.73\")   Wt 90.6 kg (199 lb 11.8 oz)   SpO2 99%   BMI 31.53 kg/m      Physical Exam  HENT:      Right Ear: Ear canal normal.      Left Ear: Tympanic membrane and ear canal normal.      Ears:      Comments: Cerumen in both ears, R>L.     Mouth/Throat:      Mouth: Mucous membranes are moist.      Pharynx: Oropharynx is clear.   Eyes:      Extraocular Movements: Extraocular movements intact.      Conjunctiva/sclera: Conjunctivae normal.      Pupils: Pupils are equal, round, and reactive to light.   Cardiovascular:      Rate and Rhythm: Normal rate and regular rhythm.      Heart sounds: Normal heart sounds.   Pulmonary:      " Effort: Pulmonary effort is normal.      Breath sounds: Normal breath sounds.   Abdominal:      General: Bowel sounds are normal.      Palpations: There is no mass.      Tenderness: There is no abdominal tenderness.   Lymphadenopathy:      Head:      Right side of head: No submental or submandibular adenopathy.      Left side of head: No submental or submandibular adenopathy.      Cervical: No cervical adenopathy.   Neurological:      Mental Status: She is alert and oriented to person, place, and time.      Sensory: Sensation is intact.      Motor: Motor function is intact. No weakness.      Deep Tendon Reflexes: Reflexes are normal and symmetric. Reflexes normal.   Psychiatric:         Behavior: Behavior is cooperative.       MR BRAIN W/O & W CONTRAST 12/10/2019 2:28 PM     Provided History: Meningioma, multiple (H).28 year old female with bilateral petrosphenoclival meningioma?s/p hydroxyurea and XRT?     ICD-10: Meningioma, multiple (H)     Comparison: 12/10/2018.     Technique: Multiplanar T1-weighted, axial FLAIR, and susceptibility images were obtained without intravenous contrast. Following intravenous gadolinium-based contrast administration, axial T2-weighted, diffusion, and T1-weighted images (in multiple planes) were obtained.     Contrast dose: 9 mL Gadavist     Findings: Left frontal approach shunt catheter with the tip of the catheter extending to the foramen of Monro. Susceptibility artifacts associated with shunt reservoir. Mild patchy meningeal enhancement likely from chronic shunting. No change in the caliber of the ventricular system. Multiple dural based presumed meningiomas particularly at the level of  basal cisterns. Grossly compared to most recent MRI from 9/10/2019, there is no significant change in the size, number and appearance of these multiple presumed meningiomas. Largest lesion is in continuation in the basal cistern level. Due to its complex shape, and exact measurement cannot be  obtained. Unchanged encasement of the basilar artery, but the largest meningioma, which is retroclival and also displacing the jessica. Extension into bilateral cerebellopontine angle and left greater than right encasement of 7th and 8th cranial nerves at the internal acoustic canal. These lesions extend into Meckel's cave along the 5th cranial nerve and encases both optic nerves at the entrance of the optic canal. In addition, these lesions are protruding into the cavernous sinuses bilaterally along the multiple cranial nerves in the prepontine cistern level. Involvement of the inferior medial portion of the right supraclinoid internal carotid artery and inferior aspect of the left supraclinoid internal carotid artery. No significant change in the size of left frontal temporal 2.6 x 1.1 cm (based on coronal images) and right frontal 1.5 x 0.9 cm meningiomas.     Intraorbital optic nerves are grossly normal. Normal orbits. Clear paranasal sinuses. Clear mastoid air cells.                                                                    Impression:     1. Stable multiple meningiomas predominantly involving the basal cisterns as described in great detail above. Again there is encasement of cisternal components of multiple cranial nerves as well as the basilar artery and abutment of bilateral supraclinoid internal carotid arteries.  2. The largest retroclival meningioma can use to displace the jessica posteriorly, but grossly unchanged.  3. No significant change in size of ventricles and no evident hydrocephalus in a patient with a left frontal ventriculostomy.     I have personally reviewed the examination and initial interpretation and I agree with the findings.     MIRTHA WATSON MD    Impression:  1. History of meningioma status post subtotal resection and radiation therapy  2. 12/10/19 Brain MRI: stable    Plan:   1. RTC in 9-10 months for follow up with brain MRI, or sooner PRN.    Patient seen and discussed  with Dr. Nena Chris MD  Internal Medicine- Pediatrics PGY2    Physician Attestation   I, Pilo Barrett MD, saw this patient with the resident and agree with the resident/fellow's findings and plan of care as documented in the note.      I personally reviewed vital signs, medications and imaging.    Key findings: I agree with the assessment as noted    Pilo Barrett MD  Date of Service (when I saw the patient): 12/10/19      Time spent with patient 25 minutes. Over 50% of the visit was spent counseling the patient, her mom, and her stepdad on Brain MRI results and treatment plan.    This document serves as a record of the services and decisions personally performed and made by Pilo Barrett MD. It was created on his behalf by Valentina Simon a trained medical scribe. The creation of this document is based on the provider's statements to the medical scribe.    The documentation recorded by the scribe accurately reflects the services I personally performed and the decisions made by me.    Pilo Barrett      Patient Care Team:  Mitchel Aldridge MD as PCP - General  Susan Short MSW as  (Pediatric Hematology/Oncology)  Dread Smith MD as MD (Neurosurgery)  Oscar Peña MD as MD (Orthopedics)  Mehnaz Weiner APRN CNP as Nurse Practitioner (Nurse Practitioner)  MITCHEL ALDRIDGE    Copy to patient  ANA STONE [address] [phone]

## 2019-12-10 NOTE — LETTER
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    Since our last visit, things have remained stable with Mita. She still has headaches, but she denies experiencing any double vision with the headaches. Her headaches are worsened with loud noises. The pain is alleviated with ibuprofen.     Fam/Soc: Mita spends her days watching TV and going to basketball and volleyball games. She is not excited for the winter and cold. Mita still lives alone in her own apartment. She is currently applying for jobs.    History was obtained from Mita, her mom, and her stepdad.    Allergies   Allergen Reactions     Nka [No Known Allergies]      Nkda [No Known Drug Allergies]      Seasonal Allergies      Current Outpatient Medications   Medication     acetaminophen (TYLENOL) 325 MG tablet     atomoxetine (STRATTERA) 40 MG capsule     ranitidine (ZANTAC) 150 MG tablet     topiramate (TOPAMAX) 25 MG tablet     cholecalciferol (VITAMIN D) 1000 UNIT tablet     medroxyPROGESTERone (PROVERA) 10 MG tablet     No current facility-administered medications for this visit.      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)      S/P  shunt      Past Surgical History:   Procedure Laterality Date     ARTHROPLASTY HIP  7/30/2013    Procedure: ARTHROPLASTY HIP;  Left Total Hip Replacement ;  Surgeon: Oscar Peña MD;  Location: [facility]     ARTHROPLASTY HIP Right 12/19/2014    Procedure: ARTHROPLASTY HIP;  Surgeon: Oscar Peña MD;  Location:   "OR     BIOPSY  10/2010    brain tumor     ENT SURGERY      T & A     IMPLANT SHUNT VENTRICULOPERITONEAL  4/5/2012    Procedure:IMPLANT SHUNT VENTRICULOPERITONEAL; Left Ventriculo-Peritoneal Shunt Placement with Stealth Axiom; Surgeon:DWIGHT HOPKINS; Location: OR     OPTICAL TRACKING SYSTEM CRANIOTOMY, EXCISE TUMOR, COMBINED  4/12/2012    Procedure:COMBINED OPTICAL TRACKING SYSTEM CRANIOTOMY, EXCISE TUMOR; Stealth Assisted Suboccipital Craniotomy and Supracerabellar Infratentorial Approach to Brain Tumor; Surgeon:KEN VAUGHN; Location: OR     No family history on file.    Review of Systems   Constitutional: Negative for fever.   HENT: Negative for hearing loss and trouble swallowing.    Eyes:        Vision is stable. Needs glasses for driving.   Respiratory: Positive for cough (Getting over a cold.). Negative for shortness of breath.    Gastrointestinal: Positive for diarrhea and nausea. Negative for vomiting.        Flu going around, almost over it.   Musculoskeletal: Negative for arthralgias, back pain, myalgias and neck pain.   Skin: Negative for rash.        No lumps or bumps.   Neurological: Negative for numbness.        No tingling.   All other systems reviewed and are negative.    /80 (BP Location: Right arm, Patient Position: Sitting, Cuff Size: Adult Large)   Pulse 106   Temp 98.4  F (36.9  C) (Oral)   Resp 18   Ht 1.695 m (5' 6.73\")   Wt 90.6 kg (199 lb 11.8 oz)   SpO2 99%   BMI 31.53 kg/m       Physical Exam  HENT:      Right Ear: Ear canal normal.      Left Ear: Tympanic membrane and ear canal normal.      Ears:      Comments: Cerumen in both ears, R>L.     Mouth/Throat:      Mouth: Mucous membranes are moist.      Pharynx: Oropharynx is clear.   Eyes:      Extraocular Movements: Extraocular movements intact.      Conjunctiva/sclera: Conjunctivae normal.      Pupils: Pupils are equal, round, and reactive to light.   Cardiovascular:      Rate and Rhythm: Normal rate and " regular rhythm.      Heart sounds: Normal heart sounds.   Pulmonary:      Effort: Pulmonary effort is normal.      Breath sounds: Normal breath sounds.   Abdominal:      General: Bowel sounds are normal.      Palpations: There is no mass.      Tenderness: There is no abdominal tenderness.   Lymphadenopathy:      Head:      Right side of head: No submental or submandibular adenopathy.      Left side of head: No submental or submandibular adenopathy.      Cervical: No cervical adenopathy.   Neurological:      Mental Status: She is alert and oriented to person, place, and time.      Sensory: Sensation is intact.      Motor: Motor function is intact. No weakness.      Deep Tendon Reflexes: Reflexes are normal and symmetric. Reflexes normal.   Psychiatric:         Behavior: Behavior is cooperative.       MR BRAIN W/O & W CONTRAST 12/10/2019 2:28 PM     Provided History: Meningioma, multiple (H).28 year old female with bilateral petrosphenoclival meningioma?s/p hydroxyurea and XRT?     ICD-10: Meningioma, multiple (H)     Comparison: 12/10/2018.     Technique: Multiplanar T1-weighted, axial FLAIR, and susceptibility images were obtained without intravenous contrast. Following intravenous gadolinium-based contrast administration, axial T2-weighted, diffusion, and T1-weighted images (in multiple planes) were obtained.     Contrast dose: 9 mL Gadavist     Findings: Left frontal approach shunt catheter with the tip of the catheter extending to the foramen of Monro. Susceptibility artifacts associated with shunt reservoir. Mild patchy meningeal enhancement likely from chronic shunting. No change in the caliber of the ventricular system. Multiple dural based presumed meningiomas particularly at the level of  basal cisterns. Grossly compared to most recent MRI from 9/10/2019, there is no significant change in the size, number and appearance of these multiple presumed meningiomas. Largest lesion is in continuation in the basal  cistern level. Due to its complex shape, and exact measurement cannot be obtained. Unchanged encasement of the basilar artery, but the largest meningioma, which is retroclival and also displacing the jessica. Extension into bilateral cerebellopontine angle and left greater than right encasement of 7th and 8th cranial nerves at the internal acoustic canal. These lesions extend into Meckel's cave along the 5th cranial nerve and encases both optic nerves at the entrance of the optic canal. In addition, these lesions are protruding into the cavernous sinuses bilaterally along the multiple cranial nerves in the prepontine cistern level. Involvement of the inferior medial portion of the right supraclinoid internal carotid artery and inferior aspect of the left supraclinoid internal carotid artery. No significant change in the size of left frontal temporal 2.6 x 1.1 cm (based on coronal images) and right frontal 1.5 x 0.9 cm meningiomas.     Intraorbital optic nerves are grossly normal. Normal orbits. Clear paranasal sinuses. Clear mastoid air cells.                                                                    Impression:     1. Stable multiple meningiomas predominantly involving the basal cisterns as described in great detail above. Again there is encasement of cisternal components of multiple cranial nerves as well as the basilar artery and abutment of bilateral supraclinoid internal carotid arteries.  2. The largest retroclival meningioma can use to displace the jessica posteriorly, but grossly unchanged.  3. No significant change in size of ventricles and no evident hydrocephalus in a patient with a left frontal ventriculostomy.     I have personally reviewed the examination and initial interpretation and I agree with the findings.     MIRTHA WATSON MD    Impression:  1. History of meningioma status post subtotal resection and radiation therapy  2. 12/10/19 Brain MRI: stable    Plan:   1. RTC in 9-10 months for  follow up with brain MRI, or sooner PRN.    Patient seen and discussed with Dr. Nena Chris MD  Internal Medicine- Pediatrics PGY2    Physician Attestation   I, Pilo Barrett MD, saw this patient with the resident and agree with the resident/fellow's findings and plan of care as documented in the note.      I personally reviewed vital signs, medications and imaging.    Key findings: I agree with the assessment as noted    Pilo Barrett MD  Date of Service (when I saw the patient): 12/10/19      Time spent with patient 25 minutes. Over 50% of the visit was spent counseling the patient, her mom, and her stepdad on Brain MRI results and treatment plan.    This document serves as a record of the services and decisions personally performed and made by Pilo Barrett MD. It was created on his behalf by Valentina joiner trained medical scribe. The creation of this document is based on the provider's statements to the medical scribe.    The documentation recorded by the scribe accurately reflects the services I personally performed and the decisions made by me.    Pilo Barrett      Patient Care Team:  Mitchel Aldridge MD as PCP - General  Susan Short MSW as  (Pediatric Hematology/Oncology)  Dread Smith MD as MD (Neurosurgery)  Oscar Peña MD as MD (Orthopedics)  Mehnaz Weiner APRN CNP as Nurse Practitioner (Nurse Practitioner)  MITCHEL ALDRIDGE    Copy to patient  ANA RICHARDS[address] [address]    Pilo Barrett MD

## 2020-03-01 ENCOUNTER — HEALTH MAINTENANCE LETTER (OUTPATIENT)
Age: 30
End: 2020-03-01

## 2020-05-20 ENCOUNTER — TRANSFERRED RECORDS (OUTPATIENT)
Dept: HEALTH INFORMATION MANAGEMENT | Facility: CLINIC | Age: 30
End: 2020-05-20

## 2020-09-07 ENCOUNTER — ALLIED HEALTH/NURSE VISIT (OUTPATIENT)
Dept: FAMILY MEDICINE | Facility: OTHER | Age: 30
End: 2020-09-07
Attending: SURGERY
Payer: COMMERCIAL

## 2020-09-07 DIAGNOSIS — Z29.9 PROPHYLACTIC MEASURE: Primary | ICD-10-CM

## 2020-09-07 PROCEDURE — 99207 ZZC NO CHARGE NURSE ONLY: CPT

## 2020-09-07 PROCEDURE — C9803 HOPD COVID-19 SPEC COLLECT: HCPCS

## 2020-09-07 PROCEDURE — U0003 INFECTIOUS AGENT DETECTION BY NUCLEIC ACID (DNA OR RNA); SEVERE ACUTE RESPIRATORY SYNDROME CORONAVIRUS 2 (SARS-COV-2) (CORONAVIRUS DISEASE [COVID-19]), AMPLIFIED PROBE TECHNIQUE, MAKING USE OF HIGH THROUGHPUT TECHNOLOGIES AS DESCRIBED BY CMS-2020-01-R: HCPCS | Mod: ZL | Performed by: SURGERY

## 2020-09-08 LAB
SARS-COV-2 RNA SPEC QL NAA+PROBE: NOT DETECTED
SPECIMEN SOURCE: NORMAL

## 2020-10-13 ENCOUNTER — HOSPITAL ENCOUNTER (OUTPATIENT)
Dept: MRI IMAGING | Facility: CLINIC | Age: 30
End: 2020-10-13
Attending: PEDIATRICS
Payer: MEDICARE

## 2020-10-13 ENCOUNTER — OFFICE VISIT (OUTPATIENT)
Dept: PEDIATRIC HEMATOLOGY/ONCOLOGY | Facility: CLINIC | Age: 30
End: 2020-10-13
Attending: PEDIATRICS
Payer: MEDICARE

## 2020-10-13 VITALS
HEART RATE: 78 BPM | OXYGEN SATURATION: 100 % | SYSTOLIC BLOOD PRESSURE: 119 MMHG | DIASTOLIC BLOOD PRESSURE: 80 MMHG | TEMPERATURE: 97.5 F | HEIGHT: 67 IN | BODY MASS INDEX: 33.46 KG/M2 | RESPIRATION RATE: 16 BRPM | WEIGHT: 213.19 LBS

## 2020-10-13 DIAGNOSIS — D42.9 MENINGIOMA, MULTIPLE (H): ICD-10-CM

## 2020-10-13 DIAGNOSIS — D42.9 MENINGIOMA, MULTIPLE (H): Primary | ICD-10-CM

## 2020-10-13 PROCEDURE — 70553 MRI BRAIN STEM W/O & W/DYE: CPT

## 2020-10-13 PROCEDURE — 62252 CSF SHUNT REPROGRAM: CPT

## 2020-10-13 PROCEDURE — 255N000002 HC RX 255 OP 636: Performed by: PEDIATRICS

## 2020-10-13 PROCEDURE — G0463 HOSPITAL OUTPT CLINIC VISIT: HCPCS

## 2020-10-13 PROCEDURE — 70553 MRI BRAIN STEM W/O & W/DYE: CPT | Mod: 26 | Performed by: RADIOLOGY

## 2020-10-13 PROCEDURE — 99214 OFFICE O/P EST MOD 30 MIN: CPT | Performed by: PEDIATRICS

## 2020-10-13 PROCEDURE — A9585 GADOBUTROL INJECTION: HCPCS | Performed by: PEDIATRICS

## 2020-10-13 RX ORDER — GADOBUTROL 604.72 MG/ML
10 INJECTION INTRAVENOUS ONCE
Status: COMPLETED | OUTPATIENT
Start: 2020-10-13 | End: 2020-10-13

## 2020-10-13 RX ADMIN — GADOBUTROL 9 ML: 604.72 INJECTION INTRAVENOUS at 14:23

## 2020-10-13 ASSESSMENT — MIFFLIN-ST. JEOR: SCORE: 1713.5

## 2020-10-13 NOTE — PROCEDURES
NP at bedside to reprogram shunt following MRI.  Procedure explained, questions answered.  Using the [employer] , shunt valve was interrogated and found to be at 1.0.  Previous setting was 2.0.  Using the [employer] magnet, the shunt valve was dialed up to 2.0.  This was confirmed x 3.  Pt tolerated procedure well.

## 2020-10-13 NOTE — NURSING NOTE
"Chief Complaint   Patient presents with     RECHECK     Meningioma, needs neruosurgery to check shunt       /80 (BP Location: Right arm, Patient Position: Sitting, Cuff Size: Adult Large)   Pulse 78   Temp 97.5  F (36.4  C) (Oral)   Resp 16   Ht 5' 6.61\" (169.2 cm)   Wt 213 lb 3 oz (96.7 kg)   SpO2 100%   BMI 33.78 kg/m      Peds Outpatient BP  1) Rested for 5 minutes, BP taken on bare arm, patient sitting (or supine for infants) w/ legs uncrossed?   Yes  2) Right arm used?  Right arm   Yes  3) Arm circumference of largest part of upper arm (in cm): N/A  4) BP cuff sized used: Large Adult (32-43cm)   If used different size cuff then what was recommended why? N/A  5) Machine BP reading:   BP Readings from Last 1 Encounters:   10/13/20 119/80      Is reading >90%?No   (90% for <1 years is 90/50)  (90% for >18 years is 140/90)  *If BP is >90% take manual BP  6) Manual BP reading: N/A  7) Other comments: Other Floating at this clinic today, unaware of project being done on this patient    Joanie Orlando, EMT  October 13, 2020  "

## 2020-10-13 NOTE — LETTER
10/13/2020      RE: Mita Higgins  [address] [facility]  [place] [address]       Mita comes in today for F/U of her meningioma s/p therapy. She continues to have intermittent HAs, but is o/w well. She comes in with her mother today.  MRI images are reviewed with patient and parent - see report below. Shunt is reprogrammed by neurosurgery post-MRI today.    Dx: Bilateral petrosphenoclival meningioma     Oncology history:   October 2010- biopsy of meningioma, partial resection of meningoma, placement of a  shunt   2011 Hydroxyurea therapy   2012 Proton radiation in [place]   2013 Left ADDY by Dr. Peña, Biomet implants    Results for orders placed or performed during the hospital encounter of 10/13/20   MRI Brain w & w/o contrast     Status: None    Narrative    Brain MRI without and with contrast, 10/13/2020 2:55 PM    Provided History: Meningioma; Meningioma, multiple (H).    ICD-10: Meningioma, multiple (H)    Comparison: Brain MRI from 12/10/2019.    Technique: Multiplanar T1-weighted, axial FLAIR, and susceptibility  images were obtained without intravenous contrast. Following  intravenous gadolinium-based contrast administration, axial  T2-weighted, diffusion, and T1-weighted images (in multiple planes)  were obtained.    Contrast: 9 mL Gadavist IV     Findings:  Left frontal approach ventriculostomy catheter with tip ends near  foramen of Monro. Susceptibility artifact of the shunt reservoir over  the left frontal convexity degrades the image.    Multiple dural-based extra-axial masses within the basal cistern,  right frontoparietal convexity, and left frontotemporal convexity.  Hypoattenuating, most superior part of the basal cisternal lesion on  the right is slightly increased when compared to prior study.  Otherwise no significant change in extra-axial lesions.    Largest lesion within the basal cistern has mass effect on the  brainstem. It encases and narrows the basilar artery, and extends to  the  bilateral Meckel's cave and cavernous sinuses. Abutment of the  cavernous and supraclinoid segments of the bilateral internal carotid  arteries. It also extends to the bilateral internal acoustic canals,  greater on the left.    Lesions within the right ambient cistern and overlying the right  frontoparietal convexity demonstrate mild diffusion restriction.  Lesion within the right frontal parietal convexity measures 1.7 x 1.0  cm and termination within the left frontotemporal convexity measures  2.0 x 0.8 cm. Unchanged mild thickening of the anterior falx.    No midline shift or extra-axial fluid collection. Ventricles are  proportionate to the cerebral sulci. Unchanged scattered foci of  susceptibility artifacts within the posterior fossa.      Impression    Impression:  1. Multiple presumed meningiomas within the basal cistern, right  frontoparietal convexity, and left frontotemporal convexity.  Hypoattenuating, most superior part of the basal cisternal lesion on  the right is slightly increased when compared to prior study.  Otherwise no significant change in extra-axial lesions as described  above.  2. Largest lesion within the basal cistern encases and narrows the  basilar artery and has mass effect on brainstem, similar to prior  study. It extends to the bilateral Meckel's cave and bilateral  cavernous sinuses. Also extends to the bilateral internal acoustic  canals.  3. Stable left frontal approach ventriculostomy catheter with  unchanged ventricular size and configuration.    I have personally reviewed the examination and initial interpretation  and I agree with the findings.    PAULETTE AMBROSIO MD       Impression: Stable meningioma, no evidence of other intracranial disease. Shunt functional.    Plan: F/U 1 year.    > 50% of time today spent in counseling regarding current status and imaging results. 25 minutes total time.    Pilo Barrett MD

## 2020-10-21 NOTE — PROGRESS NOTES
Mita comes in today for F/U of her meningioma s/p therapy. She continues to have intermittent HAs, but is o/w well. She comes in with her mother today.  MRI images are reviewed with patient and parent - see report below. Shunt is reprogrammed by neurosurgery post-MRI today.    Dx: Bilateral petrosphenoclival meningioma     Oncology history:   October 2010- biopsy of meningioma, partial resection of meningoma, placement of a  shunt   2011 Hydroxyurea therapy   2012 Proton radiation in [place]   2013 Left ADDY by Dr. Peña, Biomet implants    Results for orders placed or performed during the hospital encounter of 10/13/20   MRI Brain w & w/o contrast     Status: None    Narrative    Brain MRI without and with contrast, 10/13/2020 2:55 PM    Provided History: Meningioma; Meningioma, multiple (H).    ICD-10: Meningioma, multiple (H)    Comparison: Brain MRI from 12/10/2019.    Technique: Multiplanar T1-weighted, axial FLAIR, and susceptibility  images were obtained without intravenous contrast. Following  intravenous gadolinium-based contrast administration, axial  T2-weighted, diffusion, and T1-weighted images (in multiple planes)  were obtained.    Contrast: 9 mL Gadavist IV     Findings:  Left frontal approach ventriculostomy catheter with tip ends near  foramen of Monro. Susceptibility artifact of the shunt reservoir over  the left frontal convexity degrades the image.    Multiple dural-based extra-axial masses within the basal cistern,  right frontoparietal convexity, and left frontotemporal convexity.  Hypoattenuating, most superior part of the basal cisternal lesion on  the right is slightly increased when compared to prior study.  Otherwise no significant change in extra-axial lesions.    Largest lesion within the basal cistern has mass effect on the  brainstem. It encases and narrows the basilar artery, and extends to  the bilateral Meckel's cave and cavernous sinuses. Abutment of the  cavernous and  supraclinoid segments of the bilateral internal carotid  arteries. It also extends to the bilateral internal acoustic canals,  greater on the left.    Lesions within the right ambient cistern and overlying the right  frontoparietal convexity demonstrate mild diffusion restriction.  Lesion within the right frontal parietal convexity measures 1.7 x 1.0  cm and termination within the left frontotemporal convexity measures  2.0 x 0.8 cm. Unchanged mild thickening of the anterior falx.    No midline shift or extra-axial fluid collection. Ventricles are  proportionate to the cerebral sulci. Unchanged scattered foci of  susceptibility artifacts within the posterior fossa.      Impression    Impression:  1. Multiple presumed meningiomas within the basal cistern, right  frontoparietal convexity, and left frontotemporal convexity.  Hypoattenuating, most superior part of the basal cisternal lesion on  the right is slightly increased when compared to prior study.  Otherwise no significant change in extra-axial lesions as described  above.  2. Largest lesion within the basal cistern encases and narrows the  basilar artery and has mass effect on brainstem, similar to prior  study. It extends to the bilateral Meckel's cave and bilateral  cavernous sinuses. Also extends to the bilateral internal acoustic  canals.  3. Stable left frontal approach ventriculostomy catheter with  unchanged ventricular size and configuration.    I have personally reviewed the examination and initial interpretation  and I agree with the findings.    PAULETTE AMBROSIO MD       Impression: Stable meningioma, no evidence of other intracranial disease. Shunt functional.    Plan: F/U 1 year.    > 50% of time today spent in counseling regarding current status and imaging results. 25 minutes total time.    Pilo Barrett MD

## 2021-02-03 DIAGNOSIS — D42.9 MENINGIOMA, MULTIPLE (H): Primary | ICD-10-CM

## 2021-03-02 ENCOUNTER — TRANSFERRED RECORDS (OUTPATIENT)
Dept: HEALTH INFORMATION MANAGEMENT | Facility: CLINIC | Age: 31
End: 2021-03-02

## 2021-03-03 ENCOUNTER — TELEPHONE (OUTPATIENT)
Dept: PEDIATRIC HEMATOLOGY/ONCOLOGY | Facility: CLINIC | Age: 31
End: 2021-03-03

## 2021-03-03 DIAGNOSIS — G44.1 OTHER VASCULAR HEADACHE: ICD-10-CM

## 2021-03-03 DIAGNOSIS — D42.9 MENINGIOMA, MULTIPLE (H): Primary | ICD-10-CM

## 2021-03-03 NOTE — TELEPHONE ENCOUNTER
Patient called Dr. Barrett complaining of headaches that have been getting worse of the last couple of weeks. Per Dr. Barrett patient needed to get a quick brain MRI to make sure the shunt was working and patient was not having hydrocephalus. Patient went to [place] to have quick brain MRI. Dr. Barrett reviewed MRI and noted there were no issues or concerns. Mita was called and informed of this. This RN will set up an appointment with neurology to have her headaches evaluated.

## 2021-04-15 ENCOUNTER — PRE VISIT (OUTPATIENT)
Dept: NEUROLOGY | Facility: CLINIC | Age: 31
End: 2021-04-15

## 2021-04-15 NOTE — TELEPHONE ENCOUNTER
FUTURE VISIT INFORMATION      FUTURE VISIT INFORMATION:    Date: 4/21/2021    Time: 130pm    Location: [facility] [place]  REFERRAL INFORMATION:    Referring provider:  Dr. Barrett    Referring providers clinic:  [facility]     Reason for visit/diagnosis  Meniningioma, Headaches     RECORDS REQUESTED FROM:       Clinic name Comments Records Status Imaging Status   [facility]  MR Brain-3/3/2021 Care EVerywhere Requested to PACS          [facility]  CTA Head/Neck-5/22/2020    CT Head-5/21/2020, 5/20/2020 Care Everywhere Requested by mail                       4/15/2021-Request for Images faxed to [facility] and [facility]-MR @ 1pm    4.19.21 sv-- sent CD to jayda to upload  Received Images on CD from  [facility]--  CTA Head/Neck-5/22/2020  CT Head-5/21/2020, 5/20/2020 4/20/2021-[employer] [facility] Images now in PACS-MR @ 545am

## 2021-04-17 ENCOUNTER — HEALTH MAINTENANCE LETTER (OUTPATIENT)
Age: 31
End: 2021-04-17

## 2021-04-20 ENCOUNTER — HOSPITAL ENCOUNTER (OUTPATIENT)
Dept: MRI IMAGING | Facility: CLINIC | Age: 31
End: 2021-04-20
Attending: PEDIATRICS
Payer: MEDICARE

## 2021-04-20 ENCOUNTER — OFFICE VISIT (OUTPATIENT)
Dept: PEDIATRIC HEMATOLOGY/ONCOLOGY | Facility: CLINIC | Age: 31
End: 2021-04-20
Attending: PEDIATRICS
Payer: MEDICARE

## 2021-04-20 VITALS
WEIGHT: 209.88 LBS | OXYGEN SATURATION: 97 % | DIASTOLIC BLOOD PRESSURE: 75 MMHG | BODY MASS INDEX: 32.94 KG/M2 | HEIGHT: 67 IN | HEART RATE: 79 BPM | RESPIRATION RATE: 16 BRPM | SYSTOLIC BLOOD PRESSURE: 109 MMHG

## 2021-04-20 DIAGNOSIS — D42.9 MENINGIOMA, MULTIPLE (H): Primary | ICD-10-CM

## 2021-04-20 DIAGNOSIS — D42.9 MENINGIOMA, MULTIPLE (H): ICD-10-CM

## 2021-04-20 DIAGNOSIS — D49.6 INTRACRANIAL TUMOR (H): ICD-10-CM

## 2021-04-20 PROCEDURE — 99214 OFFICE O/P EST MOD 30 MIN: CPT | Performed by: PEDIATRICS

## 2021-04-20 PROCEDURE — 70553 MRI BRAIN STEM W/O & W/DYE: CPT | Mod: 26 | Performed by: STUDENT IN AN ORGANIZED HEALTH CARE EDUCATION/TRAINING PROGRAM

## 2021-04-20 PROCEDURE — 255N000002 HC RX 255 OP 636: Performed by: PEDIATRICS

## 2021-04-20 PROCEDURE — 70553 MRI BRAIN STEM W/O & W/DYE: CPT | Mod: MG

## 2021-04-20 PROCEDURE — G0463 HOSPITAL OUTPT CLINIC VISIT: HCPCS | Mod: 25

## 2021-04-20 PROCEDURE — A9585 GADOBUTROL INJECTION: HCPCS | Performed by: PEDIATRICS

## 2021-04-20 PROCEDURE — G1004 CDSM NDSC: HCPCS | Performed by: STUDENT IN AN ORGANIZED HEALTH CARE EDUCATION/TRAINING PROGRAM

## 2021-04-20 RX ORDER — GADOBUTROL 604.72 MG/ML
10 INJECTION INTRAVENOUS ONCE
Status: COMPLETED | OUTPATIENT
Start: 2021-04-20 | End: 2021-04-20

## 2021-04-20 RX ADMIN — GADOBUTROL 9.5 ML: 604.72 INJECTION INTRAVENOUS at 12:05

## 2021-04-20 ASSESSMENT — MIFFLIN-ST. JEOR: SCORE: 1703.5

## 2021-04-20 NOTE — PATIENT INSTRUCTIONS
Plan:  Referral to Dr. Castro for consult on MRI results.  Future follow up will depend on his recommendations.    Thank you for choosing [facility].    It was a pleasure to see you today.      CNS Tumor Team  Pilo Rivas, APRN, CNP  INNA Heaton, RN  - Care Coordinator        [facility], 9th Floor - [address] [id number] [place] [address]  Scheduling/Appointments: [phone]  Fax: [phone]     Numbers to call:   Monday - Friday, 8:00 am - 5:00 pm:    RN phone/voicemail: [phone]    Scheduling/Appointments: [phone]  Nights and weekends:   Call [phone] and ask the  to page the 'Pediatric Heme/Onc fellow on call' if you have an urgent concern that can't wait until the clinic opens.     Scheduling:    [facility], 9th floor [phone]  Infusion Center/Lab: [phone]   Radiology/ [phone]      Services:   [phone]      INNA Heaton, RN  CNS Tumor Care Coordinator  Office: [phone]  E-mail: [email or web address]     We encourage you to sign up for [employer] for easy communication with us.  Sign up at the clinic  or go to [email or web address].      Please try the [facility]) phone application for Virtual Tours, Procedure Preparation, Resources, Preparation for Hospital Stay and the Coloring Board.

## 2021-04-20 NOTE — NURSING NOTE
"Chief Complaint   Patient presents with     RECHECK     6 month follow up       /75 (BP Location: Right arm, Patient Position: Sitting, Cuff Size: Adult Large)   Pulse 79   Resp 16   Ht 5' 6.93\" (170 cm)   Wt 209 lb 14.1 oz (95.2 kg)   SpO2 97%   BMI 32.94 kg/m      Reyna Bee, EMT  April 20, 2021  "

## 2021-04-20 NOTE — LETTER
4/20/2021      RE: Mita Higgins  [address] [facility] [address]         HPI  Mita comes in today to review imaging. She is doing well overall. Her HA is stable and she will have a visit with Chato Weiner regarding chronic management.  She is accompanied by her mother.  Imaging was also reviewed in the neuro-oncology conference.  I reviewed the mri with Mita and specifically pointed out a lesion of concern that may be progressing slowly. It also has different imaging characteristics compare to the main meningioma. See portion of report highlighted in bold below.    I will contact Dr. Pinzon regarding the possibility of biopsy to further define this lesion.  We also discussed the possible need for more genetic testing given Mita's unique circumstances.    Onc history:  Bilateral petrosphenoclival meningioma     October 2010- biopsy of meningioma, partial resection of meningoma, placement of a  shunt   2011 Hydroxyurea therapy   2012 Proton radiation in [place]   2013 Left ADDY by Dr. Peña, Biomet implants     Dept Specialty: Radiation Oncology  Rad hx: Proton tx 6/11/12-7/19/12 [facility]    Current Outpatient Medications   Medication     acetaminophen (TYLENOL) 325 MG tablet     atomoxetine (STRATTERA) 40 MG capsule     topiramate (TOPAMAX) 25 MG tablet     ibuprofen (ADVIL/MOTRIN) 800 MG tablet     medroxyPROGESTERone (PROVERA) 10 MG tablet     No current facility-administered medications for this visit.         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     S/P total hip arthroplasty 07/30/2013     AVN (avascular necrosis of bone) (H) 12/19/2014     S/P bilateral hip replacements 01/26/2015     S/P  shunt (Strata 2.0) 09/09/2015  "    Attention deficit hyperactivity disorder (ADHD) 06/05/2006     Development delay 07/29/2009     Dyspepsia and disorder of function of stomach 02/12/2014     Amblyopia of right eye 06/04/2019     Intracranial tumor (H) 10/06/2010     Myopia, bilateral 06/04/2019     Regular astigmatism, bilateral 06/04/2019     Vertical strabismus of right eye 06/04/2019     Resolved Ambulatory Problems     Diagnosis Date Noted     No Resolved Ambulatory Problems     Past Medical History:   Diagnosis Date     ADHD (attention deficit hyperactivity disorder)      Brain tumor (H)      Gastro-oesophageal reflux disease      Meningioma (H)      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    /75 (BP Location: Right arm, Patient Position: Sitting, Cuff Size: Adult Large)   Pulse 79   Resp 16   Ht 1.7 m (5' 6.93\")   Wt 95.2 kg (209 lb 14.1 oz)   SpO2 97%   BMI 32.94 kg/m    Neurologic Exam     Mental Status   Oriented to person, place, and time.   Registration: recalls 2 of 3 objects. Recall at 5 minutes: recalls 2 of 3 objects. Follows 2 step commands.   Attention: normal. Concentration: normal.   Speech: speech is normal   Level of consciousness: alert  Knowledge: inconsistent with education. Able to perform simple calculations.   Able to name object. Able to read. Able to repeat. Able to write. Abnormal comprehension.     Cranial Nerves     CN II   Visual fields full to confrontation.     CN III, IV, VI   Pupils are equal, round, and reactive to light.  Extraocular motions are normal.     CN V   Facial sensation intact.     CN VII   Facial expression full, symmetric.     CN VIII   CN VIII normal.     CN IX, X   CN IX normal.   CN X normal.     CN XI   CN XI normal.     CN XII   CN XII normal.     Motor Exam   Muscle bulk: normal  Overall muscle tone: normal    Strength   Right neck flexion: 4/5  Left neck flexion: 4/5  Right neck extension: 4/5  Left neck " extension: 4/5  Right deltoid: 4/5  Left deltoid: 4/5  Right biceps: 4/5  Left biceps: 4/5  Right triceps: 4/5  Left triceps: 4/5  Right wrist flexion: 4/5  Left wrist flexion: 4/5  Right wrist extension: 4/5  Left wrist extension: 4/5  Right interossei: 4/5  Left interossei: 4/5  Right abdominals: 4/5  Left abdominals: 4/5  Right iliopsoas: 4/5  Left iliopsoas: 4/5  Right quadriceps: 4/5  Left quadriceps: 4/5  Right hamstrin/5  Left hamstrin/5  Right glutei: 4/5  Left glutei: 4/5  Right anterior tibial: 4/5  Left anterior tibial: 4/5  Right posterior tibial: 4/5  Left posterior tibial: 4/5  Right peroneal: 4/5  Left peroneal: 4/5  Right gastroc: 4/5  Left gastroc: 4/5    Gait, Coordination, and Reflexes     Gait  Gait: normal    Coordination   Romberg: negative  Finger to nose coordination: normal  Tandem walking coordination: normal    Tremor   Resting tremor: absent  Intention tremor: absent    Reflexes   Right brachioradialis: 1+  Left brachioradialis: 1+  Right biceps: 1+  Left biceps: 1+  Right triceps: 1+  Left triceps: 1+  Right patellar: 1+  Left patellar: 1+  Right achilles: 1+  Left achilles: 1+  Right : 1+  Left : 1+      Imaging:    Results for orders placed or performed during the hospital encounter of 21   MRI Brain w & w/o contrast     Status: None    Narrative     MR BRAIN W/O & W CONTRAST 2021 12:39 PM    Provided History: Brain mass or lesion; Meningioma, multiple (H).    ICD-10: Meningioma, multiple (H)    Comparison: Multiple brain MRI exam since 2016, most recent one  from 3/2/2020.    Technique: Multiplanar T1-weighted, axial FLAIR, and susceptibility  images were obtained without intravenous contrast. Following  intravenous gadolinium-based contrast administration, axial  T2-weighted, diffusion, and T1-weighted images (in multiple planes)  were obtained.     Contrast: 9.5ml gadavist    Findings:  No significant change in dural based homogeneously enhancing  lesions  overlying the right frontal convexity, measuring 0.9 x 0.8 cm, 2.1 x  1.1 cm, along the anterior falx, measuring 0.7 x 0.5 cm (serious 1016  image 103), and overlying the left anterior frontal temporal  convexity, measuring 2.1 x 0.9 x 2.6 cm.    Extensive homogeneously enhancing lesion within the basal cisterns and  along the bilateral tentoria, extending to the bilateral Meckel's cave  and cavernous sinuses, encasing the basilar artery, and also has mass  effect on the brainstem. The most superior part of the basal cisternal  mass on the right side (series 1016 image 114) measures 1.9 x 1.7 cm,  is unchanged compared to 10/13/2020, but increased compared to  6/4/2020, measures 1.3 x 1.2 cm. This enhancing component is also  somewhat different than the other lesions in the brain, demonstrating  a relatively less hypodense appearance on postcontrast T1 MP RAGE, and  darker on ADC map. Punctate susceptibility artifact inferior to this  lesion. Other enhancing components are unchanged. There is also  extension into the left greater than right internal auditory canals.    Left frontal approach ventriculoperitoneal shunt catheter with tip and  near foramen of Monro. Associated susceptibility artifact shunt  reservoir limits the left frontal lobe visualization. Unchanged 1  jugular size and configuration with decompressed ventricular system.  No midline shift or extra axial fluid collection.      Impression    Impression:   1. Multiple presumed meningiomas within the basal cistern, along the  anterior falx, right frontal convexity and left frontotemporal  convexity. Hypoenhancing the most superior part of the basal cisternal  lesion on the right is increased in size compared to 6/4/2019 but  stable compared with most recent MRI. Indeterminant if this is also a  meningioma or schwannoma which can potentially arise from right 4th  cranial nerve, given its relatively less hypodense enhancement on  postcontrast  series. Other potential meningiomas are also stable.  2. Stable left frontal approach ventriculoperitoneal shunt catheter  with unchanged ventricular size and configuration.    I have personally reviewed the examination and initial interpretation  and I agree with the findings.    TUAN ACOSTA MD     Impression:  Stable overall  1 lesion causing brainstem compression on R    Plan:  HA management  Will ask Dr. Pinzon to review imaging and offer opinion regarding biopsy of progressive lesion on R.  To neurosurgery to have shunt valve checked after visit    Total time spent on the following services on the date of the encounter:  Preparing to see patient, chart review, review of outside records, Referring or communicating with other healthcare professionals, Interpretation of labs, imaging and other tests, Performing a medically appropriate examination , Counseling and educating the patient/family/caregiver , Documenting clinical information in the electronic or other health record  and Total time spent: 30 minutes        Pilo Barrett MD

## 2021-04-21 ENCOUNTER — VIRTUAL VISIT (OUTPATIENT)
Dept: NEUROLOGY | Facility: CLINIC | Age: 31
End: 2021-04-21
Payer: MEDICARE

## 2021-04-21 ENCOUNTER — TELEPHONE (OUTPATIENT)
Dept: NEUROSURGERY | Facility: CLINIC | Age: 31
End: 2021-04-21

## 2021-04-21 DIAGNOSIS — D42.9 MENINGIOMA, MULTIPLE (H): ICD-10-CM

## 2021-04-21 DIAGNOSIS — R51.9 CHRONIC DAILY HEADACHE: Primary | ICD-10-CM

## 2021-04-21 DIAGNOSIS — G44.1 OTHER VASCULAR HEADACHE: ICD-10-CM

## 2021-04-21 PROCEDURE — 99214 OFFICE O/P EST MOD 30 MIN: CPT | Mod: 95 | Performed by: NURSE PRACTITIONER

## 2021-04-21 RX ORDER — IBUPROFEN 800 MG/1
800 TABLET, FILM COATED ORAL DAILY PRN
Status: ON HOLD | COMMUNITY
End: 2021-09-13

## 2021-04-21 NOTE — TELEPHONE ENCOUNTER
[facility] Call Center    Phone Message    May a detailed message be left on voicemail: yes     Reason for Call: Appointment Intake    Referring Provider Name: Pilo Barrett MD in P PEDS HEM ONC  Diagnosis and/or Symptoms: Meningioma, multiple (H) [D42.9]  Intracranial tumor (H) [D49.6]    Malka calling from [facility] to check status of scheduling patient from referral.     Please review and call patient back to discuss scheduling options     Action Taken: Other: [facility]     Travel Screening: Not Applicable

## 2021-04-21 NOTE — PROGRESS NOTES
"Mita is a 30 year old who is being evaluated via a billable video visit.      How would you like to obtain your AVS? MyChart  If the video visit is dropped, the invitation should be resent by: Send to e-mail at: [email or web address]  Will anyone else be joining your video visit? No      Video Start Time: 1:45 PM  Video-Visit Details    Type of service:  Video Visit    Video End Time:2:08 PM    Originating Location (pt. Location): Home    Distant Location (provider location):  [facility] [place]     [facility]     Rodolfo Fan is a 30 year old who presents for the following health issues-headache follow up   History of complex suprasellar and skull base meningioma diagnosed in 2010bilateral petrosphenoclival meningioma, s/p craniotomy and tumor debulking in 2012,  s/p  shunt and s/p s/p proton beam radiotherapy  Per Dr Estrada note from 2016, \"The remaining tumor is anterior to the brainstem and completely engulfs the basilar artery and is, therefore, not resectable without unacceptable consequences.  It has been radiated a several years ago in [place].   Dr Barrett/Sakshi Rivas CNP on 12/4/2018 and brain MRI was stable, shunt reset on 12/4/2018      Today reports that Headaches depends on the stress level and sleep. If neighbor is loud-hard to sleep sometimes so she may get a headache.  Headache frequency -no too many this week.   Topiramate one tab 25 mg at bedtime and helps with headaches and helps with sleep, not interested in increasing dose .   Back in February -5 headaches and spaced out. Duration of headache a day and goes away. Takes ibuprofen and helps.   Does not get more headaches. No changes in the headache medicine/treatment plan needed.   Reports that she would like people not to stress her out than she does not get too much headaches. Stressed not that often.   Reports that she goes for a walk and tries to take a deep " breath.     Takes Strattera -if overheated and gets dizzy sometimes.     Headache treatment Plan discussed with the patient during today's visit:  Mindfulness and stress relieving modalities -relaxation techniques  Sleep routine   Stay hydrated  Headache prevention-topiramate 25 mg at bedtime and may increase it to 50 mg at bedtime if needed and tolerated.   Ibuprofen as needed with food. Limit use to no more than 14 days per month.   Follow up in 6-9 months if stable or sooner if needed for plan review and headache      Review of Systems   Constitutional, HEENT, cardiovascular, pulmonary, gi and gu systems are negative, except as otherwise noted.  Reports that menstrual cycle stopped and has been taking medroxyprogesterone and helped initially.     Allergies and Current meds reviewed and updated      Objective       Vitals:  No vitals were obtained today due to virtual visit.    Physical Exam   No headache today  GENERAL: Healthy, alert and no acute distress  EYES: Eyes grossly normal to inspection.  No discharge or erythema, or obvious scleral/conjunctival abnormalities.  RESP: No audible wheeze, cough, or visible cyanosis.  No visible retractions or increased work of breathing.    SKIN: Visible skin clear. No significant rash, abnormal pigmentation or lesions.  NEURO: symmetrical PSYCH: Mentation appears normal, affect normal/bright, judgement and insight intact, normal speech and appearance well-groomed.  Walks without difficulties in the room    I discussed all my recommendations with Mita Higgins who verbalizes understanding and comfortable with the plan.  All of patient's questions were answered from the best of my knowledge.  Patient is in agreement with the plan.     32 minutes spent on the date of the encounter doing chart review, history and exam, documentation and further activities as noted above    LOUISE Pickett, CNP [facility]  Headache certified  [facility]

## 2021-04-21 NOTE — PROGRESS NOTES
ALYSHA  Mita comes in today to review imaging. She is doing well overall. Her HA is stable and she will have a visit with Chato Weiner regarding chronic management.  She is accompanied by her mother.  Imaging was also reviewed in the neuro-oncology conference.  I reviewed the mri with Mita and specifically pointed out a lesion of concern that may be progressing slowly. It also has different imaging characteristics compare to the main meningioma. See portion of report highlighted in bold below.    I will contact Dr. Pinzon regarding the possibility of biopsy to further define this lesion.  We also discussed the possible need for more genetic testing given Mita's unique circumstances.    Onc history:  Bilateral petrosphenoclival meningioma     October 2010- biopsy of meningioma, partial resection of meningoma, placement of a  shunt   2011 Hydroxyurea therapy   2012 Proton radiation in [place]   2013 Left ADDY by Dr. Peña, Biomet implants     Dept Specialty: Radiation Oncology  Rad hx: Proton tx 6/11/12-7/19/12 [facility]    Current Outpatient Medications   Medication     acetaminophen (TYLENOL) 325 MG tablet     atomoxetine (STRATTERA) 40 MG capsule     topiramate (TOPAMAX) 25 MG tablet     ibuprofen (ADVIL/MOTRIN) 800 MG tablet     medroxyPROGESTERone (PROVERA) 10 MG tablet     No current facility-administered medications for this visit.         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     S/P total hip arthroplasty 07/30/2013     AVN (avascular necrosis of bone) (H) 12/19/2014     S/P bilateral hip replacements 01/26/2015     S/P  shunt (Strata 2.0) 09/09/2015     Attention deficit hyperactivity disorder (ADHD) 06/05/2006     Development  "delay 07/29/2009     Dyspepsia and disorder of function of stomach 02/12/2014     Amblyopia of right eye 06/04/2019     Intracranial tumor (H) 10/06/2010     Myopia, bilateral 06/04/2019     Regular astigmatism, bilateral 06/04/2019     Vertical strabismus of right eye 06/04/2019     Resolved Ambulatory Problems     Diagnosis Date Noted     No Resolved Ambulatory Problems     Past Medical History:   Diagnosis Date     ADHD (attention deficit hyperactivity disorder)      Brain tumor (H)      Gastro-oesophageal reflux disease      Meningioma (H)      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    /75 (BP Location: Right arm, Patient Position: Sitting, Cuff Size: Adult Large)   Pulse 79   Resp 16   Ht 1.7 m (5' 6.93\")   Wt 95.2 kg (209 lb 14.1 oz)   SpO2 97%   BMI 32.94 kg/m    Neurologic Exam     Mental Status   Oriented to person, place, and time.   Registration: recalls 2 of 3 objects. Recall at 5 minutes: recalls 2 of 3 objects. Follows 2 step commands.   Attention: normal. Concentration: normal.   Speech: speech is normal   Level of consciousness: alert  Knowledge: inconsistent with education. Able to perform simple calculations.   Able to name object. Able to read. Able to repeat. Able to write. Abnormal comprehension.     Cranial Nerves     CN II   Visual fields full to confrontation.     CN III, IV, VI   Pupils are equal, round, and reactive to light.  Extraocular motions are normal.     CN V   Facial sensation intact.     CN VII   Facial expression full, symmetric.     CN VIII   CN VIII normal.     CN IX, X   CN IX normal.   CN X normal.     CN XI   CN XI normal.     CN XII   CN XII normal.     Motor Exam   Muscle bulk: normal  Overall muscle tone: normal    Strength   Right neck flexion: 4/5  Left neck flexion: 4/5  Right neck extension: 4/5  Left neck extension: 4/5  Right deltoid: 4/5  Left deltoid: 4/5  Right biceps: 4/5  Left biceps: " 4/5  Right triceps: 4/5  Left triceps: 4/5  Right wrist flexion: 4/5  Left wrist flexion: 4/5  Right wrist extension: 4/5  Left wrist extension: 4/5  Right interossei: 4/5  Left interossei: 4/5  Right abdominals: 4/5  Left abdominals: 4/5  Right iliopsoas: 4/5  Left iliopsoas: 4/5  Right quadriceps: 4/5  Left quadriceps: 4/5  Right hamstrin/5  Left hamstrin/5  Right glutei: 4/5  Left glutei: 4/5  Right anterior tibial: 4/5  Left anterior tibial: 4/5  Right posterior tibial: 4/5  Left posterior tibial: 4/5  Right peroneal: 4/5  Left peroneal: 4/5  Right gastroc: 4/5  Left gastroc: 4/5    Gait, Coordination, and Reflexes     Gait  Gait: normal    Coordination   Romberg: negative  Finger to nose coordination: normal  Tandem walking coordination: normal    Tremor   Resting tremor: absent  Intention tremor: absent    Reflexes   Right brachioradialis: 1+  Left brachioradialis: 1+  Right biceps: 1+  Left biceps: 1+  Right triceps: 1+  Left triceps: 1+  Right patellar: 1+  Left patellar: 1+  Right achilles: 1+  Left achilles: 1+  Right : 1+  Left : 1+      Imaging:    Results for orders placed or performed during the hospital encounter of 21   MRI Brain w & w/o contrast     Status: None    Narrative     MR BRAIN W/O & W CONTRAST 2021 12:39 PM    Provided History: Brain mass or lesion; Meningioma, multiple (H).    ICD-10: Meningioma, multiple (H)    Comparison: Multiple brain MRI exam since 2016, most recent one  from 3/2/2020.    Technique: Multiplanar T1-weighted, axial FLAIR, and susceptibility  images were obtained without intravenous contrast. Following  intravenous gadolinium-based contrast administration, axial  T2-weighted, diffusion, and T1-weighted images (in multiple planes)  were obtained.     Contrast: 9.5ml gadavist    Findings:  No significant change in dural based homogeneously enhancing lesions  overlying the right frontal convexity, measuring 0.9 x 0.8 cm, 2.1 x  1.1 cm,  along the anterior falx, measuring 0.7 x 0.5 cm (serious 1016  image 103), and overlying the left anterior frontal temporal  convexity, measuring 2.1 x 0.9 x 2.6 cm.    Extensive homogeneously enhancing lesion within the basal cisterns and  along the bilateral tentoria, extending to the bilateral Meckel's cave  and cavernous sinuses, encasing the basilar artery, and also has mass  effect on the brainstem. The most superior part of the basal cisternal  mass on the right side (series 1016 image 114) measures 1.9 x 1.7 cm,  is unchanged compared to 10/13/2020, but increased compared to  6/4/2020, measures 1.3 x 1.2 cm. This enhancing component is also  somewhat different than the other lesions in the brain, demonstrating  a relatively less hypodense appearance on postcontrast T1 MP RAGE, and  darker on ADC map. Punctate susceptibility artifact inferior to this  lesion. Other enhancing components are unchanged. There is also  extension into the left greater than right internal auditory canals.    Left frontal approach ventriculoperitoneal shunt catheter with tip and  near foramen of Monro. Associated susceptibility artifact shunt  reservoir limits the left frontal lobe visualization. Unchanged 1  jugular size and configuration with decompressed ventricular system.  No midline shift or extra axial fluid collection.      Impression    Impression:   1. Multiple presumed meningiomas within the basal cistern, along the  anterior falx, right frontal convexity and left frontotemporal  convexity. Hypoenhancing the most superior part of the basal cisternal  lesion on the right is increased in size compared to 6/4/2019 but  stable compared with most recent MRI. Indeterminant if this is also a  meningioma or schwannoma which can potentially arise from right 4th  cranial nerve, given its relatively less hypodense enhancement on  postcontrast series. Other potential meningiomas are also stable.  2. Stable left frontal approach  ventriculoperitoneal shunt catheter  with unchanged ventricular size and configuration.    I have personally reviewed the examination and initial interpretation  and I agree with the findings.    TUAN ACOSTA MD     Impression:  Stable overall  1 lesion causing brainstem compression on R    Plan:  HA management  Will ask Dr. Pinzon to review imaging and offer opinion regarding biopsy of progressive lesion on R.  To neurosurgery to have shunt valve checked after visit    Total time spent on the following services on the date of the encounter:  Preparing to see patient, chart review, review of outside records, Referring or communicating with other healthcare professionals, Interpretation of labs, imaging and other tests, Performing a medically appropriate examination , Counseling and educating the patient/family/caregiver , Documenting clinical information in the electronic or other health record  and Total time spent: 30 minutes        Pilo Barrett MD

## 2021-04-26 NOTE — PROCEDURES
NP at bedside to reprogram shunt following MRI.  Procedure explained, questions answered.  Using the cube19 , shunt valve was interrogated and found to be at 1.0.  Previous setting was 2.0.  Using the [employer] magnet, the shunt valve was dialed up to 2.0.  This was confirmed x 3.  Pt tolerated procedure well.

## 2021-04-26 NOTE — TELEPHONE ENCOUNTER
RECORDS RECEIVED FROM: Internal   Date of Appt: 5/12/21   NOTES (FOR ALL VISITS) STATUS DETAILS   OFFICE NOTE from referring provider Internal Dr Pilo Barrett @ [facility] Peds Onc:  4/20/21  10/13/20  12/10/19  9/10/19  (additional encounters in Epic)   OFFICE NOTE from other specialist Internal Mehnaz Weiner NP @ [facility] Neurology:  4/21/21  6/4/19  2/21/19  1/10/19   DISCHARGE SUMMARY from hospital N/A    DISCHARGE REPORT from the ER N/A    OPERATIVE REPORT N/A    MEDICATION LIST Internal    IMAGING  (FOR ALL VISITS)     EMG N/A    EEG N/A    LUMBAR PUNCTURE N/A    DANIEL SCAN N/A    ULTRASOUND (CAROTID BILAT) *VASCULAR* N/A    MRI (HEAD, NECK, SPINE) Internal/Received [facility]:  MRI Brain 4/20/21  MRI Brain 10/13/20  MRI Brain 12/10/19  MRI Brain 9/10/19  MRI Brain 6/4/19  MRI Brain 12/4/18  (additional images in Epic/PACS)    [facility]  [facility] Brain 3/2/21   CT (HEAD, NECK, SPINE) N/A

## 2021-05-10 NOTE — PATIENT INSTRUCTIONS
Headache treatment Plan discussed with the patient during today's visit:  Mindfulness and stress relieving modalities -relaxation techniques  Sleep routine   Stay hydrated  Headache prevention-topiramate 25 mg at bedtime and may increase it to 50 mg at bedtime if needed and tolerated.   Ibuprofen as needed with food. Limit use to no more than 14 days per month.   Follow up in 6-9 months if stable or sooner if needed for plan review and headache

## 2021-05-10 NOTE — PROGRESS NOTES
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 Barrett,    It was a pleasure to see Ms. Higgins in Skull Base Neurosurgery clinic today regarding her extensive meningioma history as a new patient. As you know, Ms. Higgins is a 30 year old left-handed female who was diagnosed with a large bilateral petroclival meningioma after presenting with headaches. She is here today with her sister. She initially underwent a right pterional craniotomy with Dr. Smith in 2010 for biopsy, which revealed WHO grade 1 meningioma. She then was found with obstructive hydrocephalus and underwent a left frontal  shunt placement (Strata 1.5) with Dr. Jaffe in 2012. Then later in 2012, she underwent a right paramedian supracerebellar, infratentorial approach by Dr. Edwards and Dr. Anderson for debulking. The fourth nerve was cut during this operation in 2012 and she has glasses to use when she experiences double vision. She is able to use a telephone with both ears.    Neurosurgical history:  1) 10/18/2010 - Right pterional craniotomy by Dr. Smith for biopsy (path: WHO 1 meningioma)  2) 2011 - hydroxyurea therapy  3) 4/5/2012 - left frontal  shunt (Strata 1.5) by Dr. Jaffe  4) 4/14/2012 - right paramedian supracerebellar infratentorial approach for debulking by Dr. Anderson and Dr. Edwards (path: WHO 1 meningioma). Note, fourth nerve was cut during this surgery.  5) 6/11/2012-7/19/2012 - postop proton beam radiation at [facility] [place]  6) Followed by [facility] for chronic headaches    Past medical history: meningioma as above, hydrocephalus s/p  shunt as above, bilateral hip osteonecrosis s/p ADDY, ADHD, myopia,  strabismus    Medications: acetaminophen, atomoxetine, topiramate, ibuprofen, medroxyprogesterone    Allergies: seasonal allergies    Social history: not working now, formerly in job training, nonsmoker. Sister María who lives in [place] who is in town.    Family history:  No brain tumors, but breast cancer and cervical cancer run in the family.    Exam: She is oriented to person, place, and time. She follows two-step commands. Head is held with a slight head tilt to the left. Her visual fields are full to confrontation. Her extraocular movements intact except a slight right nasal uptick. Her face is symmetric with activation. She moves all extremities at least antigravity, no drift. Dysmetria bilaterally.    Imaging:  We reviewed her MRI images. The recent MRI demonstrates gradual regrowth of the right crural/ambient cistern tumor since 2012. The remainder of the extensive petroclival tumor appears minimally changed. The right frontal convexity meningioma appears to be slowly growing since 2015. The left sphenoid wing meningioma appears minimally larger compared to 2015. There is no evidence of new ventriculomegaly with shunt catheter in position.    Assessment:  1. 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 Obstructive hydrocephalus s/p left frontal  shunt (Strata 1.5; Dr. Jaffe; 4/2012)  3. Right frontal convexity meningioma, slowly growing  4. Left sphenoid wing meningioma, slowly growing  5. Bilateral hip osteonecrosis s/p ADDY  6. Headaches, followed by Neurology Headache Clinic    Plan:  1. We reviewed the MRI showing slow growth of the right ambient cistern tumor and right convexity meningioma. We discussed the risks and benefits of an operation to biopsy or resect the growing ambient cistern tumor. We discussed PCA or other artery/venous injury which could result is a major stroke, brainstem injury, diplopia, facial numbness/pain,  bleeding, infection, weakness, numbness, edema, seizure, coma, death, need for additional procedures.  2. I emphasized there is no urgency to perform the surgery asap, but it will likely need to be done at some point in the not too distant future given the brainstem compression.  This could be done through a redo right subtemporal approach or a redo right paramedian supracerebellar, infratentorial approach; with lumbar drain placement.  3. She will think about timing and get back to us.   4. In the meantime, we will plan to see her back in 3 months with either myself or Gloria MIRANDA.  5. I encouraged her to contact me should I be able to assist with any questions or concerns.    It was a pleasure to participate in the care of your patient. Please feel free to contact me should I be able to provide any assistance for Ms. Higgins.    Sincerely,  Kris Pinzon MD

## 2021-05-12 ENCOUNTER — OFFICE VISIT (OUTPATIENT)
Dept: NEUROSURGERY | Facility: CLINIC | Age: 31
End: 2021-05-12
Attending: PEDIATRICS
Payer: MEDICARE

## 2021-05-12 ENCOUNTER — PRE VISIT (OUTPATIENT)
Dept: NEUROSURGERY | Facility: CLINIC | Age: 31
End: 2021-05-12

## 2021-05-12 VITALS
RESPIRATION RATE: 16 BRPM | HEART RATE: 86 BPM | SYSTOLIC BLOOD PRESSURE: 105 MMHG | BODY MASS INDEX: 33.12 KG/M2 | HEIGHT: 67 IN | WEIGHT: 211 LBS | OXYGEN SATURATION: 97 % | DIASTOLIC BLOOD PRESSURE: 72 MMHG

## 2021-05-12 DIAGNOSIS — D32.9 MENINGIOMA (H): Primary | ICD-10-CM

## 2021-05-12 PROCEDURE — 99204 OFFICE O/P NEW MOD 45 MIN: CPT | Performed by: NEUROLOGICAL SURGERY

## 2021-05-12 ASSESSMENT — MIFFLIN-ST. JEOR: SCORE: 1709.72

## 2021-05-12 ASSESSMENT — PAIN SCALES - GENERAL: PAINLEVEL: NO PAIN (0)

## 2021-05-12 NOTE — PATIENT INSTRUCTIONS
Patient to Follow-up with Gloria Astorga in 3 months, no new imaging needed.    Loli Santos RN    Thank you for choosing [facility]

## 2021-05-12 NOTE — NURSING NOTE
Chief Complaint   Patient presents with     Consult     Meningioma, multiple, intracranial tumor     Jani Zarate

## 2021-05-12 NOTE — Clinical Note
5/12/2021       RE: Mita Higgins  [address] [place] [id number]     Dear Colleague,    Thank you for referring your patient, Mita Higgins, to the [facility] NEUROSURGERY CLINIC [place] [facility]. Please see a copy of my visit note below.    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 Barrett,    It was a pleasure to see Ms. Higgins in Skull Base Neurosurgery clinic today regarding her extensive meningioma history as a new patient. As you know, Ms. Higgins is a 30 year old left-handed female who was diagnosed with a large bilateral petroclival meningioma after presenting with headaches.  She initially underwent a right pterional craniotomy with Dr. Smith in 2010 for biopsy, which revealed WHO grade 1 meningioma. She then was found with obstructive hydrocephalus and underwent a left frontal  shunt placement (Strata 1.5) with Dr. Jaffe in 2012. Then later in 2012, she underwent a right paramedian supracerebellar, infratentorial approach by Dr. Edwards and Dr. Anderson for debulking. The fourth nerve was cut during this operation in 2012 and she has glasses to use when she experiences double vision. She is able to use a telephone with both ears.    Neurosurgical history:  1) 10/18/2010 - Right pterional craniotomy by Dr. Smith for biopsy (path: WHO 1 meningioma)  2) 2011 - hydroxyurea therapy  3) 4/5/2012 - left frontal  shunt (Strata 1.5) by Dr. Jaffe  4) 4/14/2012 - right paramedian supracerebellar infratentorial approach for debulking by Dr. Anderson and Dr. Edwards (path: WHO 1 meningioma). Note, fourth nerve was cut during this surgery.  5) 6/11/2012-7/19/2012 - postop  [facility] beam [facility] [place] [facility]  6) Followed by Neu[facility]ache Clinic for chronic headaches    Past medical history: meningioma as above, hydrocephalus s/p  shunt as above, bilateral hip osteonecrosis s/p ADDY, ADHD, myopia, strabismus    Medications: acetaminophen, atomoxetine, topiramate, ibuprofen, medroxyprogesterone    Allergies: seasonal allergies    Social history: not working now, formerly in job training, nonsmoker. Sister María who lives in [place] who is in town.    Family history:  No brain tumors, but breast cancer and cervical cancer run in the family.    Exam: She is oriented to person, place, and time. She follows two-step commands. Head is held with a slight head tilt to the left. Her visual fields are full to confrontation. Her extraocular movements intact except a slight right nasal uptick. Her face is symmetric with activation. She moves all extremities at least antigravity, no drift. Dysmetria bilaterally.    Imaging:  We reviewed her MRI images. The recent MRI demonstrates gradual regrowth of the right crural/ambient cistern tumor since 2012. The remainder of the extensive petroclival tumor appears minimally changed. The right frontal convexity meningioma appears to be slowly growing since 2015. The left sphenoid wing meningioma appears minimally larger compared to 2015. There is no evidence of new ventriculomegaly with shunt catheter in position.    Assessment:  1. 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 Obstructive hydrocephalus s/p left frontal  shunt (Strata 1.5; Dr. Jaffe; 4/2012)  3. Right frontal convexity meningioma, slowly growing  4. Left sphenoid wing meningioma, slowly growing  5. Bilateral hip osteonecrosis s/p ADDY  6. Headaches, followed by Neurology Headache Clinic    Plan:  1. We reviewed the MRI showing slow growth of the right ambient cistern tumor and right  convexity meningioma. We discussed the risks and benefits of an operation to biopsy or resect the growing ambient cistern tumor. We discussed PCA or other artery/venous injury which could result is a major stroke, brainstem injury, diplopia, facial numbness/pain, bleeding, infection, weakness, numbness, edema, seizure, coma, death, need for additional procedures.  2. She would like to ***  3. I encouraged her to contact me should I be able to assist with any questions or concerns.    It was a pleasure to participate in the care of your patient. Please feel free to contact me should I be able to provide any assistance for Ms. Higgins.    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 Barrett,    It was a pleasure to see Ms. Higgins in Skull Base Neurosurgery clinic today regarding her extensive meningioma history as a new patient. As you know, Ms. Higgins is a 30 year old left-handed female who was diagnosed with a large bilateral petroclival meningioma after presenting with headaches. She is here today with her sister. She initially underwent a right pterional craniotomy with Dr. Smith in 2010 for biopsy, which revealed WHO grade 1 meningioma. She then was found with obstructive hydrocephalus and underwent a left frontal  shunt placement (Strata 1.5) with Dr. Jaffe in 2012. Then later in 2012, she underwent a right paramedian supracerebellar, infratentorial approach by Dr. Edwards and Dr. Anderson for debulking. The fourth nerve was cut during this operation in 2012 and she has glasses to use when she experiences double vision. She is able to use a telephone with both ears.    Neurosurgical history:  1) 10/18/2010 -  Right pterional craniotomy by Dr. Smith for biopsy (path: WHO 1 meningioma)  2) 2011 - hydroxyurea therapy  3) 4/5/2012 - left frontal  shunt (Strata 1.5) by Dr. Jaffe  4) 4/14/2012 - right paramedian supracerebellar infratentorial approach for debulking by Dr. Anderson and Dr. Edwards (path: WHO 1 meningioma). Note, fourth nerve was cut during this surgery.  5) 6/11/2012-7/19/2012 - postop proton beam radiation at [facility] [place]  6) Followed by Neurology Headache Clinic for chronic headaches    Past medical history: meningioma as above, hydrocephalus s/p  shunt as above, bilateral hip osteonecrosis s/p ADDY, ADHD, myopia, strabismus    Medications: acetaminophen, atomoxetine, topiramate, ibuprofen, medroxyprogesterone    Allergies: seasonal allergies    Social history: not working now, formerly in job training, nonsmoker. Sister María who lives in [place] who is in town.    Family history:  No brain tumors, but breast cancer and cervical cancer run in the family.    Exam: She is oriented to person, place, and time. She follows two-step commands. Head is held with a slight head tilt to the left. Her visual fields are full to confrontation. Her extraocular movements intact except a slight right nasal uptick. Her face is symmetric with activation. She moves all extremities at least antigravity, no drift. Dysmetria bilaterally.    Imaging:  We reviewed her MRI images. The recent MRI demonstrates gradual regrowth of the right crural/ambient cistern tumor since 2012. The remainder of the extensive petroclival tumor appears minimally changed. The right frontal convexity meningioma appears to be slowly growing since 2015. The left sphenoid wing meningioma appears minimally larger compared to 2015. There is no evidence of new ventriculomegaly with shunt catheter in position.    Assessment:  1. 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 Obstructive hydrocephalus s/p left frontal  shunt (Strata 1.5; Dr. Jaffe; 4/2012)  3. Right frontal convexity meningioma, slowly growing  4. Left sphenoid wing meningioma, slowly growing  5. Bilateral hip osteonecrosis s/p ADDY  6. Headaches, followed by Neurology Headache Clinic    Plan:  1. We reviewed the MRI showing slow growth of the right ambient cistern tumor and right convexity meningioma. We discussed the risks and benefits of an operation to biopsy or resect the growing ambient cistern tumor. We discussed PCA or other artery/venous injury which could result is a major stroke, brainstem injury, diplopia, facial numbness/pain, bleeding, infection, weakness, numbness, edema, seizure, coma, death, need for additional procedures.  2. I emphasized there is no urgency to perform the surgery asap, but it will likely need to be done at some point in the not too distant future given the brainstem compression.    3. She will think about timing and get back to us.   4. In the meantime, we will plan to see her back in 3 months with either myself or Gloria MIRANDA.  5. I encouraged her to contact me should I be able to assist with any questions or concerns.    It was a pleasure to participate in the care of your patient. Please feel free to contact me should I be able to provide any assistance for Ms. Higgins.    Sincerely,  Kris Pinzon MD      Again, thank you for allowing me to participate in the care of your patient.      Sincerely,    Kris Pinzon MD

## 2021-05-12 NOTE — LETTER
[place] FOR SKULL BASE AND PITUITARY SURGERY  [facility] NEUROSURGERY CLINIC [address]  3RD FLO[address]  [facility] [address]  Phone: [phone]  Fax: [phone]          5/12/2021    RE:   Mita Higgins  [address] [facility] [address]      Dear Colleague,    Thank you for referring your patient, Mita Higgins, to the Center for Skull Base and Pituitary Surgery. Please see a copy of my visit note below.    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 Barrett,    It was a pleasure to see Ms. Higgins in Skull Base Neurosurgery clinic today regarding her extensive meningioma history as a new patient. As you know, Ms. Higgins is a 30 year old left-handed female who was diagnosed with a large bilateral petroclival meningioma after presenting with headaches. She is here today with her sister. She initially underwent a right pterional craniotomy with Dr. Smith in 2010 for biopsy, which revealed WHO grade 1 meningioma. She then was found with obstructive hydrocephalus and underwent a left frontal  shunt placement (Strata 1.5) with Dr. Jaffe in 2012. Then later in 2012, she underwent a right paramedian supracerebellar, infratentorial approach by Dr. Edwards and Dr. Anderson for debulking. The fourth nerve was cut during this operation in 2012 and she has glasses to use when she experiences double vision. She is able to use a telephone with both ears.    Neurosurgical history:  1) 10/18/2010 - Right pterional craniotomy by Dr. Smith for biopsy (path: WHO 1 meningioma)  2) 2011 - hydroxyurea therapy  3) 4/5/2012 - left frontal  shunt (Strata 1.5) by Dr. Jaffe  4) 4/14/2012 - right paramedian supracerebellar  infratentorial approach for debulking by Dr. Anderson and Dr. Edwards (path: WHO 1 meningioma). Note, fourth nerve was cut during this surgery.  5) 6/11/2012-7/19/2012 - postop proton beam radiation at [facility]  6) Followed by Neurology Headache Clinic for chronic headaches    Past medical history: meningioma as above, hydrocephalus s/p  shunt as above, bilateral hip osteonecrosis s/p ADDY, ADHD, myopia, strabismus    Medications: acetaminophen, atomoxetine, topiramate, ibuprofen, medroxyprogesterone    Allergies: seasonal allergies    Social history: not working now, formerly in job training, nonsmoker. Sister María who lives in [place] who is in town.    Family history:  No brain tumors, but breast cancer and cervical cancer run in the family.    Exam: She is oriented to person, place, and time. She follows two-step commands. Head is held with a slight head tilt to the left. Her visual fields are full to confrontation. Her extraocular movements intact except a slight right nasal uptick. Her face is symmetric with activation. She moves all extremities at least antigravity, no drift. Dysmetria bilaterally.    Imaging:  We reviewed her MRI images. The recent MRI demonstrates gradual regrowth of the right crural/ambient cistern tumor since 2012. The remainder of the extensive petroclival tumor appears minimally changed. The right frontal convexity meningioma appears to be slowly growing since 2015. The left sphenoid wing meningioma appears minimally larger compared to 2015. There is no evidence of new ventriculomegaly with shunt catheter in position.    Assessment:  1. 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 Obstructive hydrocephalus s/p left frontal  shunt (Strata 1.5; Dr. Jaffe; 4/2012)  3. Right frontal convexity meningioma, slowly growing  4. Left sphenoid wing meningioma, slowly growing  5. Bilateral  hip osteonecrosis s/p ADDY  6. Headaches, followed by Neurology Headache Clinic    Plan:  1. We reviewed the MRI showing slow growth of the right ambient cistern tumor and right convexity meningioma. We discussed the risks and benefits of an operation to biopsy or resect the growing ambient cistern tumor. We discussed PCA or other artery/venous injury which could result is a major stroke, brainstem injury, diplopia, facial numbness/pain, bleeding, infection, weakness, numbness, edema, seizure, coma, death, need for additional procedures.  2. I emphasized there is no urgency to perform the surgery asap, but it will likely need to be done at some point in the not too distant future given the brainstem compression.  This could be done through a redo right subtemporal approach or a redo right paramedian supracerebellar, infratentorial approach; with lumbar drain placement.  3. She will think about timing and get back to us.   4. In the meantime, we will plan to see her back in 3 months with either myself or Gloria MIRANDA.  5. I encouraged her to contact me should I be able to assist with any questions or concerns.    It was a pleasure to participate in the care of your patient. Please feel free to contact me should I be able to provide any assistance for Ms. Higgins.    Sincerely,  Kris Pinzon MD

## 2021-05-13 ENCOUNTER — TELEPHONE (OUTPATIENT)
Dept: NEUROSURGERY | Facility: CLINIC | Age: 31
End: 2021-05-13

## 2021-05-13 ENCOUNTER — MYC MEDICAL ADVICE (OUTPATIENT)
Dept: NEUROSURGERY | Facility: CLINIC | Age: 31
End: 2021-05-13

## 2021-05-14 NOTE — TELEPHONE ENCOUNTER
[facility] Call Center    Phone Message    May a detailed message be left on voicemail: yes     Reason for Call: Other: Patient is calling to follow up on form that needs to be signed by the provider. Please call to provide status update.       Please advise.     Action Taken: Message routed to:  [facility] ([facility]    Travel Screening: Not Applicable

## 2021-05-15 ENCOUNTER — MYC MEDICAL ADVICE (OUTPATIENT)
Dept: NEUROSURGERY | Facility: CLINIC | Age: 31
End: 2021-05-15

## 2021-05-17 NOTE — TELEPHONE ENCOUNTER
[facility] Call Center    Phone Message    May a detailed message be left on voicemail: yes     Reason for Call: Other: Mita Higgins calling to inform that the [facility] is the place that she will be sending her reimbursement form to. She is requesting that this paperwork be faxed to [phone].      Action Taken: Message routed to:  [facility] ([facility]): [facility]    Travel Screening: Not Applicable

## 2021-05-19 NOTE — TELEPHONE ENCOUNTER
Per Epic message. Form completed and faxed to number provided.    Patient provided correct fax number 05/19/2021.   Message to CALI Cheng LPN  Neurosurgery

## 2021-05-19 NOTE — TELEPHONE ENCOUNTER
[facility] Call Center    Phone Message    May a detailed message be left on voicemail: yes     Reason for Call: Other: Mita Higgins calling due to not receiving the fax back with Dr. Pinzon's signature. She stated the fax number as [phone] as the correct fax number. She also stated that the bank where this is being faxed to closes by 5:00 today.     Action Taken: Message routed to:  [facility] ([facility] [facility] [place]    Travel Screening: Not Applicable

## 2021-05-20 ENCOUNTER — TELEPHONE (OUTPATIENT)
Dept: NEUROSURGERY | Facility: CLINIC | Age: 31
End: 2021-05-20

## 2021-05-20 ENCOUNTER — CARE COORDINATION (OUTPATIENT)
Dept: NEUROSURGERY | Facility: CLINIC | Age: 31
End: 2021-05-20

## 2021-05-20 NOTE — PROGRESS NOTES
Writer faxed form to [employer]  Fax [phone]     Fax number provided by patient via Matter and Form message.   Form scanned to patient chart.     Neelima Coyle LPN  Neurosurgery

## 2021-05-20 NOTE — TELEPHONE ENCOUNTER
Writer attempting to reach mother Adelaida Lara in regards to the form that has been completed.  Writer left VM for Adelaida stating that the form should be faxed today to the number left by Mita. Advised her to call me back if it is not received by end of business today.

## 2021-05-21 ENCOUNTER — TELEPHONE (OUTPATIENT)
Dept: NEUROSURGERY | Facility: CLINIC | Age: 31
End: 2021-05-21

## 2021-05-21 NOTE — TELEPHONE ENCOUNTER
[facility] Call Center    Phone Message    May a detailed message be left on voicemail: yes     Reason for Call: Other: pt reportin21 on that paperwork because that was the date of pts appt with Dr. Pinzon. pt reporting that IF this paperwork does not get processed asap, pt's worker does NOT get paid. Pt needs her worker with her to go to appts. Pt is very concerned because her worker is getting very frustrated. Please process this paperwork asap. Pt does want a call asap from Nurse Loli.   Thank you.    Action Taken: Message routed to:  [facility] Center ([facility]): [facility]    Travel Screening: Not Applicable

## 2021-05-21 NOTE — TELEPHONE ENCOUNTER
Writer attempting to contact patients mother Adelaida related to her VM left for me.  Writer advised Adelaida of the fax number the form was sent to at [employer].

## 2021-05-21 NOTE — TELEPHONE ENCOUNTER
Writer spoke with patient and confirmed with her that the form has been faxed to the number she provided.  Patient verbalized understanding, will check with [employer] to see if it has arrived.

## 2021-05-25 ENCOUNTER — TELEPHONE (OUTPATIENT)
Dept: NEUROSURGERY | Facility: CLINIC | Age: 31
End: 2021-05-25

## 2021-05-25 NOTE — TELEPHONE ENCOUNTER
Writer faxed reimbursement paperwork to [phone] due to discrepencies in phone messages. Paperwork has already been scanned into patient's EMR.     CALI Bennett   Neurosurgery navigator.

## 2021-06-22 ENCOUNTER — OFFICE VISIT (OUTPATIENT)
Dept: NEUROSURGERY | Facility: CLINIC | Age: 31
End: 2021-06-22
Attending: NURSE PRACTITIONER
Payer: MEDICARE

## 2021-06-22 VITALS
HEART RATE: 81 BPM | BODY MASS INDEX: 34.19 KG/M2 | HEIGHT: 66 IN | WEIGHT: 212.74 LBS | SYSTOLIC BLOOD PRESSURE: 115 MMHG | DIASTOLIC BLOOD PRESSURE: 78 MMHG | TEMPERATURE: 97.8 F

## 2021-06-22 DIAGNOSIS — Z98.2 S/P VP SHUNT: Primary | ICD-10-CM

## 2021-06-22 PROCEDURE — 62252 CSF SHUNT REPROGRAM: CPT | Performed by: NURSE PRACTITIONER

## 2021-06-22 PROCEDURE — G0463 HOSPITAL OUTPT CLINIC VISIT: HCPCS

## 2021-06-22 ASSESSMENT — MIFFLIN-ST. JEOR: SCORE: 1709.62

## 2021-06-22 NOTE — LETTER
6/22/2021      RE: Mita Higgins  [address] [facility]  [place] [address]       NP at bedside to reprogram shunt after pt reports [place].  Procedure explained, questions answered.  Using the Sensum , shunt valve was interrogated and found to be at 2.0.  Previous setting was 1.5.  Using the strata magnet, the shunt valve was dialed to 1.5.  This was confirmed x 3.  Pt tolerated procedure well.            Dejah Pinzon, ABDON

## 2021-06-22 NOTE — PATIENT INSTRUCTIONS
You met with Pediatric Neurosurgery at the [facility]    ABDON Brink [address]., Dr., NP      Pediatric Appointment Scheduling and Call Center:   [phone]    Nurse Practitioner  [phone]    Mailing Address  [address] [address]    Street Address   [address] [place] [address]    Fax Number  [phone]    For urgent matters that cannot wait until the next business day, occur over a holiday and/or weekend, report directly to your nearest ER or you may call [phone] and ask to page the Pediatric Neurosurgery Resident on call.

## 2021-06-22 NOTE — LETTER
6/22/2021      RE: Mita Higgins  [address]  [place] [address]       NP at bedside to reprogram shunt after pt reports she [place] the airport.  Procedure explained, questions answered.  Using the [employer] , shunt valve was interrogated and found to be at 2.0.  Previous setting was 1.5.  Using the strata magnet, the shunt valve was dialed to 1.5.  This was confirmed x 3.  Pt tolerated procedure well.            Dejah Pinzon, ABDON

## 2021-06-22 NOTE — PROGRESS NOTES
NP at bedside to reprogram shunt after pt reports she went through security at the airport.  Procedure explained, questions answered.  Using the [employer] , shunt valve was interrogated and found to be at 2.0.  Previous setting was 1.5.  Using the [employer] magnet, the shunt valve was dialed to 1.5.  This was confirmed x 3.  Pt tolerated procedure well.

## 2021-06-22 NOTE — NURSING NOTE
"Chief Complaint   Patient presents with     RECHECK     Shunt reset     /78 (BP Location: Right arm, Patient Position: Sitting, Cuff Size: Adult Large)   Pulse 81   Temp 97.8  F (36.6  C)   Ht 5' 6.5\" (168.9 cm)   Wt 212 lb 11.9 oz (96.5 kg)   BMI 33.83 kg/m      Lorraine Lubin LPN    "

## 2021-08-04 ENCOUNTER — TELEPHONE (OUTPATIENT)
Dept: NEUROSURGERY | Facility: CLINIC | Age: 31
End: 2021-08-04

## 2021-08-04 NOTE — TELEPHONE ENCOUNTER
----- Message from Kris Pinzon MD sent at 8/4/2021  3:24 PM CDT -----  Masoud Evans    Thanks for reaching out. We had visited a few months ago and our plan was to meet again in 3 months (8/20) to readdress the growing ambien cistern tumor.  She knows she will need surgery at some point but there is no surgery currently scheduled.    Loli, could I ask you to review this with the patient so they understand?  If they prefer, it can be switched to virtual if they don't want to come into the [facility].    Thank you   Prudencio  ----- Message -----  From: Pilo Barrett MD  Sent: 8/4/2021  10:36 AM CDT  To: Kris Pinzon MD    Mita has a [facility] neurosurgery visit scheduled for 8/20/21 - do you know what it's for? The family called wondering if she is having surgery that day.

## 2021-08-04 NOTE — TELEPHONE ENCOUNTER
Koryr attempting to contact patient today to discuss the appointment on 8/20/21.  Per Dr. Pinzon the patient is scheduled to readdress the ambien cistern tumor that she has, this can be changed to a virtual appointment if patient prefers.  Writer left VM for patient as well as sent a [employer] message with this information and the scheduling phone number.

## 2021-08-20 ENCOUNTER — VIRTUAL VISIT (OUTPATIENT)
Dept: NEUROSURGERY | Facility: CLINIC | Age: 31
End: 2021-08-20
Payer: MEDICARE

## 2021-08-20 DIAGNOSIS — D42.9 MENINGIOMA, MULTIPLE (H): Primary | ICD-10-CM

## 2021-08-20 PROCEDURE — 99214 OFFICE O/P EST MOD 30 MIN: CPT | Mod: 95 | Performed by: PHYSICIAN ASSISTANT

## 2021-08-20 NOTE — LETTER
2021       RE: Mita Higgins  [address]  [place] [address]     Dear Colleague,    Thank you for referring your patient, Mita Higgins, to the [facility] NEUROSURGERY CLINIC [place] [facility]. Please see a copy of my visit note below.      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 She met with Dr. Pinzon 2021 at which time they discussed future need for resection or biopsy of her growing ambient cistern tumor. 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 She reports that she's felt tired and has had worse headaches lately. She follows with the [facility]. She denies new vision changes, dizziness, falls, gait trouble, paresthesias, or weakness today. She's accompanied by her mother for this video visit.     Review of Systems:   Pertinent items are noted in HPI or as in patient entered ROS below, remainder of complete ROS is negative.     Physical Exam limited by video visit:  Neuro: The patient is fully oriented. Speech is normal. Extraocular movements are intact by video.  Psych: Normal mood and affect. Behavior is normal.      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 We'll likely need an updated MRI for planning, will discuss with Dr. Pinzon and be in touch with the family regarding moving forward with this.      Gloria Astorga PA-C  Department of Neurosurgery      Again, thank you for allowing me to participate in the care of your patient.      Sincerely,    Gloria Astorga PA-C

## 2021-08-20 NOTE — PROGRESS NOTES
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 She met with Dr. Pinzon 2021 at which time they discussed future need for resection or biopsy of her growing ambient cistern tumor. 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 She reports that she's felt tired and has had worse headaches lately. She follows with the [facility]. She denies new vision changes, dizziness, falls, gait trouble, paresthesias, or weakness today. She's accompanied by her mother for this video visit.     Review of Systems:   Pertinent items are noted in HPI or as in patient entered ROS below, remainder of complete ROS is negative.     Physical Exam limited by video visit:  Neuro: The patient is fully oriented. Speech is normal. Extraocular movements are intact by video.  Psych: Normal mood and affect. Behavior is normal.      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 We'll likely need an updated MRI for planning, will discuss with Dr. Pinzon and be in touch with the family regarding moving forward with this.      Gloria Astorga PA-C  Department of Neurosurgery

## 2021-08-21 NOTE — PATIENT INSTRUCTIONS
Follow up with Dr. Pinzon by phone visit to discuss surgery plans.    Please have an MRI done within the next 1 month, prior to surgery (date not set yet)    Call with any questions in the meantime!

## 2021-08-25 ENCOUNTER — PATIENT OUTREACH (OUTPATIENT)
Dept: NEUROSURGERY | Facility: CLINIC | Age: 31
End: 2021-08-25

## 2021-08-25 ENCOUNTER — VIRTUAL VISIT (OUTPATIENT)
Dept: NEUROSURGERY | Facility: CLINIC | Age: 31
End: 2021-08-25
Payer: MEDICARE

## 2021-08-25 DIAGNOSIS — D32.9 MENINGIOMA (H): Primary | ICD-10-CM

## 2021-08-25 DIAGNOSIS — G93.5 COMPRESSION OF BRAIN (H): ICD-10-CM

## 2021-08-25 DIAGNOSIS — S04.20XD: ICD-10-CM

## 2021-08-25 PROCEDURE — 99214 OFFICE O/P EST MOD 30 MIN: CPT | Mod: 95 | Performed by: NEUROLOGICAL SURGERY

## 2021-08-25 RX ORDER — CEFAZOLIN SODIUM 2 G/50ML
2 SOLUTION INTRAVENOUS
Status: CANCELLED | OUTPATIENT
Start: 2021-08-25

## 2021-08-25 RX ORDER — CEFAZOLIN SODIUM 2 G/50ML
2 SOLUTION INTRAVENOUS SEE ADMIN INSTRUCTIONS
Status: CANCELLED | OUTPATIENT
Start: 2021-08-25

## 2021-08-25 NOTE — LETTER
[place] FOR SKULL BASE AND PITUITARY SURGERY  [facility] NEUROSURGERY CLINIC [address]  3RD FLOOR  [facility] [address]  Phone: [phone]  Fax: [phone]          2021    RE:   Mita Higgins  [address]  [place] [address]      Dear Colleague,    Thank you for referring your patient, Mita Higgins, to the Center for Skull Base and Pituitary Surgery. Please see a copy of my visit note below.        [place] for Skull Base and Pituitary Surgery      Mita Higgins   30 year old female who is being evaluated via a video visit, her mother is also present on the call   : 1990  Referring provider: Dr. Barrett   2021      Reason for visit: bilateral petroclival meningioma, follow up visit    Dear Dr. Barrett    It was a pleasure to see Ms. Higgins in Skull Base Neurosurgery clinic today regarding her extensive meningioma history as a new patient. As you know, Ms. Higgins is a 30 year old left-handed female who was diagnosed with a large bilateral petroclival meningioma after presenting with headaches. She is here today with her sister. She initially underwent a right pterional craniotomy with Dr. Smith in  for biopsy, which revealed WHO grade 1 meningioma. She then was found with obstructive hydrocephalus and underwent a left frontal  shunt placement (Strata 1.5) with Dr. Jaffe in . Then later in , she underwent a right paramedian supracerebellar, infratentorial approach by Dr. Edwards and Dr. Anderson for debulking. The fourth nerve was cut during this operation in 2012.    She has recently seen myself and my PA Gloria Astorga and at that time she wished to proceed with surgery.    Review of Systems:   Pertinent items are noted in HPI, remainder of complete ROS is negative.     Exam by video: She is oriented to person, place, and time. She follows two-step commands. Head is held with a slight head tilt to the left. Her visual fields are  full to confrontation. Her extraocular movements intact except a slight right nasal uptick. Her face is symmetric with activation. She moves all extremities at least antigravity, no drift.     Imaging:  We reviewed her recent MRI in April 2021 and compared it to the prior MRI from 2018. This demonstrates gradually enlarging enhancing mass in the right ambien and crural cisterns with significant mass effect on the midbrain.     Assessment:  1. 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 Obstructive hydrocephalus s/p left frontal  shunt (Strata 1.5; Dr. Jaffe; 4/2012)  3. Right frontal convexity meningioma, slowly growing  4. Left sphenoid wing meningioma, slowly growing  5. Bilateral hip osteonecrosis s/p ADDY  6. Headaches, followed by [facility]    Plan:   1. I reviewed the imaging results together with her and her mother by video visit. We focused on the nodule of tumor that is growing gradually and is compressing the midbrain. This is a risky surgery given the scar tissue from prior surgery and the critical structures around the area. She and her mother are aware of this. We discussed major risks of her surgery including bleeding, infection, incomplete removal, Csf leak, air embolism, neurologic injury, major stroke, hemiplegia/hemianesthesia, vision loss. We also discussed that while a redo supracerebellar infratentorial approach would be recommended at this time, if it was not adequate to achieve a good resection, a second lateral subtemporal approach may be necessary.  2. They wish to proceed with surgery. We will place a case request for a redo supracerebellar, infratentorial approach with lumbar drain placement.  3. We will need an updated MRI  4. She will visit with PAC  5. She will have a CT/CTA the morning of surgery with fiducials  6. I encouraged her to contact us should any questions or concerns arise in advance of her  "next appointment      It has been a pleasure to participate in the care of your patient. Please feel free to contact us if we may be of any assistance for Ms. Higgins.    Visit:  Video started at 1245  Video ended at 1310    Kris Pinzon MD   Department of Neurosurgery  Center for Skull Base and Pituitary Surgery  [facility]         Scribe Disclosure:  I August Srinivasa, am serving as a scribe to document services personally performed by Kris Pinzon MD at this visit, based upon the provider's statements to me. All documentation has been reviewed by the aforementioned provider prior to being entered into the official medical record.        Patient statement: cannot visit in person due to Covid19    Physician has received verbal consent for a Video Visit from the patient? Yes    Patient would like the video invitation sent by: [employer] Well    Originating Location (pt. Location): Home    Distant Location (provider location):  [facility] NEUROSURGERY CLINIC [place]     Mode of Communication:  Video Conference via [employer]      Mita STONE Ronaldo is a 30 year old female who is being evaluated via a billable video visit.      The patient has been notified of following:     \"This video visit will be conducted via a call between you and your physician/provider. We have found that certain health care needs can be provided without the need for an in-person physical exam.  This service lets us provide the care you need with a video conversation.  If a prescription is necessary we can send it directly to your pharmacy.  If lab work is needed we can place an order for that and you can then stop by our lab to have the test done at a later time.    If during the course of the call the physician/provider feels a video visit is not appropriate, you will not be charged for this service.\"             Again, thank you for allowing me to participate in the care of your patient.      Sincerely,    Kris " EDY Pinzon MD

## 2021-08-25 NOTE — LETTER
2021       RE: Mita Higgins  [address]  [place] [address]     Dear Colleague,    Thank you for referring your patient, Mita Higgins, to the [facility] NEUROSURGERY CLINIC [place] at [facility]. Please see a copy of my visit note below.    Center for Skull Base and Pituitary Surgery      Mita Higgins   30 year old female who is being evaluated via a video visit, her mother is also present on the call   : 1990  Referring provider: Dr. Barrett   2021      Reason for visit: bilateral petroclival meningioma, follow up visit    Dear Dr. Barrett    It was a pleasure to see Ms. Higgins in Skull Base Neurosurgery clinic today regarding her extensive meningioma history as a new patient. As you know, Ms. Higgins is a 30 year old left-handed female who was diagnosed with a large bilateral petroclival meningioma after presenting with headaches. She is here today with her sister. She initially underwent a right pterional craniotomy with Dr. Smith in  for biopsy, which revealed WHO grade 1 meningioma. She then was found with obstructive hydrocephalus and underwent a left frontal  shunt placement (Strata 1.5) with Dr. Jaffe in . Then later in , she underwent a right paramedian supracerebellar, infratentorial approach by Dr. Edwards and Dr. Anderson for debulking. The fourth nerve was cut during this operation in 2012.    She has recently seen myself and my PA Gloria Astorga and at that time she wished to proceed with surgery.    Review of Systems:   Pertinent items are noted in HPI, remainder of complete ROS is negative.     Exam by video: She is oriented to person, place, and time. She follows two-step commands. Head is held with a slight head tilt to the left. Her visual fields are full to confrontation. Her extraocular movements intact except a slight right nasal uptick. Her face is symmetric with activation. She moves all  extremities at least antigravity, no drift.     Imaging:  We reviewed her recent MRI in April 2021 and compared it to the prior MRI from 2018. This demonstrates gradually enlarging enhancing mass in the right ambien and crural cisterns with significant mass effect on the midbrain.     Assessment:  1. 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 Obstructive hydrocephalus s/p left frontal  shunt (Strata 1.5; Dr. Jaffe; 4/2012)  3. Right frontal convexity meningioma, slowly growing  4. Left sphenoid wing meningioma, slowly growing  5. Bilateral hip osteonecrosis s/p ADDY  6. Headaches, followed by Neu[facility] Headache Clinic    Plan:   1. I reviewed the imaging results together with her and her mother by video visit. We focused on the nodule of tumor that is growing gradually and is compressing the midbrain. This is a risky surgery given the scar tissue from prior surgery and the critical structures around the area. She and her mother are aware of this. We discussed major risks of her surgery including bleeding, infection, incomplete removal, Csf leak, air embolism, neurologic injury, major stroke, hemiplegia/hemianesthesia, vision loss. We also discussed that while a redo supracerebellar infratentorial approach would be recommended at this time, if it was not adequate to achieve a good resection, a second lateral subtemporal approach may be necessary.  2. They wish to proceed with surgery. We will place a case request for a redo supracerebellar, infratentorial approach with lumbar drain placement.  3. We will need an updated MRI  4. She will visit with PAC  5. She will have a CT/CTA the morning of surgery with fiducials  6. I encouraged her to contact us should any questions or concerns arise in advance of her next appointment      It has been a pleasure to participate in the care of your patient. Please feel free to contact us if we may be of any  assistance for Ms. Higgins.    Visit:  Video started at 1245  Video ended at 1310    Kris Pinzon MD   Department of Neurosurgery  Center for Skull Base and Pituitary Surgery  [facility]         Scribe Disclosure:  I, Spencer Bernabe, am serving as a scribe to document services personally performed by Kris Pinzon MD at this visit, based upon the provider's statements to me. All documentation has been reviewed by the aforementioned provider prior to being entered into the official medical record.      Again, thank you for allowing me to participate in the care of your patient.      Sincerely,    Kris Pinzon MD

## 2021-08-25 NOTE — PATIENT INSTRUCTIONS
Thank you for choosing [facility]. You were seen in the Skull Base Clinic today with Dr. Pinzon.    Next steps:  1. We will call you to arrange surgery and pre-surgery appointments. This will include: a preoperative visit with our Preoperative Assessment Center (PAC), an MRI, COVID-19 test within 4 days of surgery, CT and CTA the morning of surgery.      Please don't hesitate to reach out via MyChart or telephone if you have any questions after your visit.    Barb Bhat MA, RN, PHN, Replaced by [facility]  RN Care Coordinator, Skull Base Surgery    Direct: [phone]  ENT Clinic: [phone]  Neurosurgery Clinic: [phone]

## 2021-08-25 NOTE — PROGRESS NOTES
[facility]      Mita Higgins   30 year old female who is being evaluated via a video visit, her mother is also present on the call   : 1990  Referring provider: Dr. Barrett   2021      Reason for visit: bilateral petroclival meningioma, follow up visit    Dear Dr. Barrett    It was a pleasure to see Ms. Higgins in Skull Base Neurosurgery clinic today regarding her extensive meningioma history as a new patient. As you know, Ms. Higgins is a 30 year old left-handed female who was diagnosed with a large bilateral petroclival meningioma after presenting with headaches. She is here today with her sister. She initially underwent a right pterional craniotomy with Dr. Smith in  for biopsy, which revealed WHO grade 1 meningioma. She then was found with obstructive hydrocephalus and underwent a left frontal  shunt placement (Strata 1.5) with Dr. Jaffe in . Then later in , she underwent a right paramedian supracerebellar, infratentorial approach by Dr. Edwards and Dr. Anderson for debulking. The fourth nerve was cut during this operation in .    She has recently seen myself and my PA Gloria Astorga and at that time she wished to proceed with surgery.    Review of Systems:   Pertinent items are noted in HPI, remainder of complete ROS is negative.     Exam by video: She is oriented to person, place, and time. She follows two-step commands. Head is held with a slight head tilt to the left. Her visual fields are full to confrontation. Her extraocular movements intact except a slight right nasal uptick. Her face is symmetric with activation. She moves all extremities at least antigravity, no drift.     Imaging:  We reviewed her recent MRI in 2021 and compared it to the prior MRI from 2018. This demonstrates gradually enlarging enhancing mass in the right ambien and crural cisterns with significant mass effect on the midbrain.     Assessment:  1. bilateral  Please see most recent Zelgor message.  I addressed this already.   petroclival meningioma s/p two surgeries c/b fourth nerve injury (Jerry Anderson 2010, 2012)   2. Obstructive hydrocephalus s/p left frontal  shunt (Strata 1.5; Dr. Jaffe; 4/2012)  3. Right frontal convexity meningioma, slowly growing  4. Left sphenoid wing meningioma, slowly growing  5. Bilateral hip osteonecrosis s/p ADDY  6. Headaches, followed by [facility]    Plan:   1. I reviewed the imaging results together with her and her mother by video visit. We focused on the nodule of tumor that is growing gradually and is compressing the midbrain. This is a risky surgery given the scar tissue from prior surgery and the critical structures around the area. She and her mother are aware of this. We discussed major risks of her surgery including bleeding, infection, incomplete removal, Csf leak, air embolism, neurologic injury, major stroke, hemiplegia/hemianesthesia, vision loss. We also discussed that while a redo supracerebellar infratentorial approach would be recommended at this time, if it was not adequate to achieve a good resection, a second lateral subtemporal approach may be necessary.  2. They wish to proceed with surgery. We will place a case request for a redo supracerebellar, infratentorial approach with lumbar drain placement.  3. We will need an updated MRI  4. She will visit with PAC  5. She will have a CT/CTA the morning of surgery with fiducials  6. I encouraged her to contact us should any questions or concerns arise in advance of her next appointment      It has been a pleasure to participate in the care of your patient. Please feel free to contact us if we may be of any assistance for Ms. Higgins.    Visit:  Video started at 1245  Video ended at 1310    Kris Pinzon MD   Department of Neurosurgery  [facility]  [facility]         Scribe Disclosure:  I, Spencer Bernabe, am serving as a scribe to document services personally performed by  "Kris Pinzon MD at this visit, based upon the provider's statements to me. All documentation has been reviewed by the aforementioned provider prior to being entered into the official medical record.        Patient statement: cannot visit in person due to Covid19    Physician has received verbal consent for a Video Visit from the patient? Yes    Patient would like the video invitation sent by: Am Well    Originating Location (pt. Location): Home    Distant Location (provider location):  [facility]UROSURG[facility] [place]     Mode of Communication:  Video Conference via SMR SITE      Mita CHASE Higgins is a 30 year old female who is being evaluated via a billable video visit.      The patient has been notified of following:     \"This video visit will be conducted via a call between you and your physician/provider. We have found that certain health care needs can be provided without the need for an in-person physical exam.  This service lets us provide the care you need with a video conversation.  If a prescription is necessary we can send it directly to your pharmacy.  If lab work is needed we can place an order for that and you can then stop by our lab to have the test done at a later time.    If during the course of the call the physician/provider feels a video visit is not appropriate, you will not be charged for this service.\"       "

## 2021-08-25 NOTE — PROGRESS NOTES
Mita Higgins is a 30 year old who is being evaluated via a billable video visit.      How would you like to obtain your AVS? MyChart AND BY MAIL  If the video visit is dropped, the invitation should be resent by: Send to e-mail at: [email or web address]  Will anyone else be joining your video visit? No  {If patient encounters technical issues they should call [phone] :[phone]}    Video Start Time: {video visit start/end time for provider to select:575488}  Video-Visit Details    Type of service:  Video Visit    Video End Time:{video visit start/end time for provider to select:[id number]}    Originating Location (pt. Location): Home    Distant Location (provider location):  [facility] NEUROSURGERY [facility]     Platform used for Video Visit: NEON Concierge

## 2021-08-25 NOTE — PROGRESS NOTES
Surgery Planning    SURGERY INFORMATION      PROCEDURE: Redo supracerebellar infratentorial approach for tumor with lumbar drain placement and endoscopic assistance     DATE OF CASE REQUEST: 8/25/21  SURGEON(S): Dr. Pinzon    SURGERY INFORMATION:    Date: 9/10/2021    Time: 7:30 AM    Location: [facility] / [address] [place] [address]     POST-OP APPOINTMENTS:    Scheduled 9/24 with Gloria Astorga      SURGERY CHECKLIST      Task To be Completed Completed Notes   Multi-Specialty Coordination n/a     Surgery Packet & Soap      Surgery Teaching  8/25    H&P: PCP or PAC  PAC 8/31    Pre-Surgery Imaging MRI Brain w/ stealth protocol, scheduled 9/9     Labs n/a     COVID-19 Test      Day-of Imaging CT/CTA scheduled     Healthcare Directive      Social Work Consult n/a     FMLA Paperwork              *Scheduling Notes:

## 2021-08-25 NOTE — Clinical Note
August 25, 2021       TO: Mita Higgins  [address]  [place] [address]       DearMs.Ronaldo,    We are writing to inform you of your test results.    {p results letter list:[id number]}    No results found from the In Basket message.    ***

## 2021-08-27 ENCOUNTER — TELEPHONE (OUTPATIENT)
Dept: NEUROSURGERY | Facility: CLINIC | Age: 31
End: 2021-08-27

## 2021-08-27 DIAGNOSIS — Z11.59 ENCOUNTER FOR SCREENING FOR OTHER VIRAL DISEASES: ICD-10-CM

## 2021-08-27 PROBLEM — G93.5 COMPRESSION OF BRAIN (H): Status: ACTIVE | Noted: 2021-08-27

## 2021-08-27 PROBLEM — S04.20XD: Status: ACTIVE | Noted: 2021-08-27

## 2021-08-30 NOTE — TELEPHONE ENCOUNTER
FUTURE VISIT INFORMATION      SURGERY INFORMATION:    Date: 9/10/21    Location: [facility] OR    Surgeon:  Kris Pinzon MD    Anesthesia Type:  general    Procedure: Redo supracerebellar infratentorial approach for tumor with lumbar drain placement and endoscopic assistance    Consult: virtual visit     RECORDS REQUESTED FROM:       Primary Care Provider: Mitchel Aldridge MD- [facility]    Most recent EKG+ Tracin20- [employer]

## 2021-08-31 ENCOUNTER — VIRTUAL VISIT (OUTPATIENT)
Dept: SURGERY | Facility: CLINIC | Age: 31
End: 2021-08-31
Payer: MEDICARE

## 2021-08-31 ENCOUNTER — MYC MEDICAL ADVICE (OUTPATIENT)
Dept: NEUROSURGERY | Facility: CLINIC | Age: 31
End: 2021-08-31

## 2021-08-31 ENCOUNTER — ANESTHESIA EVENT (OUTPATIENT)
Dept: SURGERY | Facility: CLINIC | Age: 31
DRG: 025 | End: 2021-08-31
Payer: MEDICARE

## 2021-08-31 ENCOUNTER — PRE VISIT (OUTPATIENT)
Dept: SURGERY | Facility: CLINIC | Age: 31
End: 2021-08-31

## 2021-08-31 DIAGNOSIS — D32.9 MENINGIOMA (H): ICD-10-CM

## 2021-08-31 DIAGNOSIS — Z01.818 PREOP EXAMINATION: Primary | ICD-10-CM

## 2021-08-31 PROCEDURE — 99213 OFFICE O/P EST LOW 20 MIN: CPT | Mod: 95 | Performed by: PHYSICIAN ASSISTANT

## 2021-08-31 ASSESSMENT — PAIN SCALES - GENERAL: PAINLEVEL: NO PAIN (0)

## 2021-08-31 ASSESSMENT — ENCOUNTER SYMPTOMS: SEIZURES: 0

## 2021-08-31 ASSESSMENT — LIFESTYLE VARIABLES: TOBACCO_USE: 0

## 2021-08-31 NOTE — PROGRESS NOTES
Mita Higgins is a 30 year old who is being evaluated via a billable video visit.      How would you like to obtain your AVS? MyChart  HPI         Review of Systems         Objective    Vitals - Patient Reported  Pain Score: No Pain (0)        Physical Exam

## 2021-08-31 NOTE — H&P
Pre-Operative H & P     CC:  Preoperative exam to assess for increased cardiopulmonary risk while undergoing surgery and anesthesia.    Date of Encounter: 8/31/2021  Primary Care Physician:  Mitchel Aldridge     Reason for Visit: Meningioma (H); Compression of brain (H); Injury of trochlear nerve, unspecified laterality, subsequent encounter     Video-Visit Details    Type of service:  Video Visit    Patient verbally consented to video service today: YES      Video Start Time: 0921  Video End Time (time video stopped): 0944    Originating Location (pt. Location): Home    Distant Location (provider location):  [facility] PREOPERATIVE ASSESS[facility] CENTER     Mode of Communication:  Video Conference via AmericanWell      HPI  Mita Higgins is a 31 y/o female who presents for pre-operative H&P in preparation for Redo supracerebellar infratentorial approach for tumor with lumbar drain placement and endoscopic assistance with Kris Pinzon MD on 9/10/21 at [facility] for treatment of Meningioma (H); Compression of brain (H); Injury of trochlear nerve, unspecified laterality, subsequent encounter. Patient is being evaluated for comorbid conditions of ADHD, GERD, B/L avascular necrosis of hip, s/p B/L hip arthroplasties, obesity.      Ms. Higgins was diagnosed with a large bilateral petroclival meningioma after presenting with headaches. She is here today with her sister. She initially underwent a right pterional craniotomy with Dr. Smith in 2010 for biopsy, which revealed WHO grade 1 meningioma. She then was found with obstructive hydrocephalus and underwent a left frontal  shunt placement (Strata 1.5) with Dr. Jaffe in 2012. Then later in 2012, she underwent a right paramedian supracerebellar, infratentorial approach by Dr. Edwards and Dr. Anderson for debulking. The fourth nerve was cut during this operation in 2012.      Her recent MRI in April 2021 was compared to the  prior MRI from 2018. This demonstrates gradually enlarging enhancing mass in the right ambien and crural cisterns with significant mass effect on the midbrain.      History was obtained from patient & chart review. She is accompanied by her mother.        Hx of abnormal bleeding or anti-platelet use: denies    Menstrual history: No LMP recorded. (Menstrual status: Amenorrhea).:      Prior to Admission Medications  Current Outpatient Medications   Medication Sig Dispense Refill     acetaminophen (TYLENOL) 325 MG tablet Take 2 tablets (650 mg) by mouth every 6 hours (Patient taking differently: Take 650 mg by mouth every 6 hours as needed ) 100 tablet 1     atomoxetine (STRATTERA) 40 MG capsule Take 1 capsule by mouth every morning Hold DOS 12-19       ibuprofen (ADVIL/MOTRIN) 800 MG tablet Take 800 mg by mouth daily as needed        medroxyPROGESTERone (PROVERA) 10 MG tablet Take 10 mg by mouth  (Patient not taking: Reported on 2021)       topiramate (TOPAMAX) 25 MG tablet TAKE ONE TABLET ORALLY AT EACH BEDTIME (Patient taking differently: At Bedtime TAKE ONE TABLET ORALLY AT EACH BEDTIME) 30 tablet 6       Family History  Family History   Problem Relation Age of Onset     Chronic Obstructive Pulmonary Disease Mother      Anesthesia Reaction No family hx of      Cardiovascular No family hx of      Deep Vein Thrombosis (DVT) No family hx of        The complete review of systems is negative other than noted in the HPI or here.       Anesthesia Pre-Procedure Evaluation    Patient: Mita Higgins   MRN: [id number] : 1990        Preoperative Diagnosis: Meningioma (H) [D32.9]  Compression of brain (H) [G93.5]  Injury of trochlear nerve, unspecified laterality, subsequent encounter [S04.20XD]   Procedure : Procedure(s):  Redo supracerebellar infratentorial approach for tumor with lumbar drain placement and endoscopic assistance     Past Medical History:   Diagnosis Date     ADHD (attention deficit hyperactivity  disorder)      Brain tumor (H)      Gastro-oesophageal reflux disease      Meningioma (H)      Meningioma, multiple (H) 9/14/2011     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)      S/P  shunt       Past Surgical History:   Procedure Laterality Date     ARTHROPLASTY HIP  7/30/2013    Procedure: ARTHROPLASTY HIP;  Left Total Hip Replacement ;  Surgeon: Oscar Peña MD;  Location: [facility] OR     ARTHROPLASTY HIP Right 12/19/2014    Procedure: ARTHROPLASTY HIP;  Surgeon: Oscar Peña MD;  Location: [facility] OR     BIOPSY  10/2010    brain tumor     ENT SURGERY      T & A     IMPLANT SHUNT VENTRICULOPERITONEAL  4/5/2012    Procedure:IMPLANT SHUNT VENTRICULOPERITONEAL; Left Ventriculo-Peritoneal Shunt Placement with Stealth Axiom; Surgeon:DWIGHT HOPKINS; Location:[facility] OR     OPTICAL TRACKING SYSTEM CRANIOTOMY, EXCISE TUMOR, COMBINED  4/12/2012    Procedure:COMBINED OPTICAL TRACKING SYSTEM CRANIOTOMY, EXCISE TUMOR; Stealth Assisted Suboccipital Craniotomy and Supracerabellar Infratentorial Approach to Brain Tumor; Surgeon:KEN VAUGHN; Location:[facility] OR      Allergies   Allergen Reactions     Nkda [No Known Drug Allergies]      Seasonal Allergies       Social History     Tobacco Use     Smoking status: Never Smoker     Smokeless tobacco: Never Used   Substance Use Topics     Alcohol use: Yes     Comment: rare---- only on special occasions       Wt Readings from Last 1 Encounters:   06/22/21 96.5 kg (212 lb 11.9 oz)          ROS/MED HX  The complete review of systems is negative other than noted in the HPI or here.  Patient denies recent illness, fever and respiratory infection during past month.  Pt denies steroid use during past year.    ENT/Pulmonary:  - neg pulmonary ROS  (-) tobacco use, asthma and sleep apnea   Neurologic: Comment: S/P meningioma resection in 2010 w/ subsequent development of obstructive hydrocephalus. Recent imaging shows gradually enlarging enhancing mass.    S/P left frontal  shunt placement  (Strata 1.5) in 2012    Trochlear nerve injury    ADHD    Mild cognitive impairment    Chronic headaches, taking topamax     (-) no seizures and no CVA   Cardiovascular:     (+) -----Previous cardiac testing   Echo: Date: Results:    Stress Test: Date: Results:    ECG Reviewed: Date: 5/21/20 Results:   - OTHERWISE NORMAL ECG -   SINUS RHYTHM   RIGHT AXIS DEVIATION  Ventricular rate 65 bpm    Cath: Date: Results:      METS/Exercise Tolerance: 4 - Raking leaves, gardening Comment: Activity limited by balance issues   Hematologic:    (-) history of blood clots and history of blood transfusion   Musculoskeletal: Comment: B/L AVN of hips, s/p B/L hip arthroplasties        GI/Hepatic: Comment: GERD w/ specific foods      (+) GERD,  (-) liver disease   Renal/Genitourinary:  - neg Renal ROS     Endo:  - neg endo ROS  (-) Type II DM   Psychiatric/Substance Use:       Infectious Disease:  - neg infectious disease ROS     Malignancy:  - neg malignancy ROS     Other:  - neg other ROS             Virtual visit -  No vitals were obtained       Physical Exam  Constitutional: Awake, alert, cooperative, no apparent distress, and appears stated age.  Neurologic: Awake, alert, oriented to name, place and time.   Neuropsychiatric: Calm, cooperative. Normal affect. Answers questions appropriately.    ** Patient's visit was done virtually today.  A full physical exam was not completed.  Please refer to the physical examination documented by the anesthesiologist in the anesthesia record on the day of surgery. **        PRIOR LABS/DIAGNOSTIC STUDIES:   All labs and imaging personally reviewed      MR BRAIN W/O & W CONTRAST 4/20/2021   Impression:    1. Multiple presumed meningiomas within the basal cistern, along the   anterior falx, right frontal convexity and left frontotemporal   convexity. Hypoenhancing the most superior part of the basal cisternal   lesion on the right is increased in size compared to 6/4/2019 but   stable compared  with most recent MRI. Indeterminant if this is also a   meningioma or schwannoma which can potentially arise from right 4th   cranial nerve, given its relatively less hypodense enhancement on   postcontrast series. Other potential meningiomas are also stable.   2. Stable left frontal approach ventriculoperitoneal shunt catheter   with unchanged ventricular size and configuration.    EKG 5/21/20   - OTHERWISE NORMAL ECG -   SINUS RHYTHM   RIGHT AXIS DEVIATION  Ventricular rate 65 bpm        CBC:   Lab Results   Component Value Date    WBC 4.5 12/21/2014    WBC 5.0 12/20/2014    HGB 9.2 (L) 12/21/2014    HGB 9.5 (L) 12/20/2014    HCT 26.5 (L) 12/21/2014    HCT 27.5 (L) 12/20/2014     12/21/2014     12/20/2014     BMP:   Lab Results   Component Value Date     12/21/2014     12/20/2014    POTASSIUM 3.8 12/21/2014    POTASSIUM 3.7 12/20/2014    CHLORIDE 104 12/21/2014    CHLORIDE 106 12/20/2014    CO2 29 12/21/2014    CO2 28 12/20/2014    BUN 5 (L) 12/21/2014    BUN 6 (L) 12/20/2014    CR 0.58 12/21/2014    CR 0.65 12/20/2014     (H) 12/21/2014     (H) 12/20/2014     COAGS:   Lab Results   Component Value Date    PTT 21 (L) 04/12/2012    INR 1.35 (H) 12/21/2014     POC:   Lab Results   Component Value Date    HCG Negative 06/04/2019    HCGS Negative 04/05/2012     HEPATIC:   Lab Results   Component Value Date    ALBUMIN 3.0 (L) 04/05/2012    PROTTOTAL 5.5 (L) 04/05/2012    ALT 47 04/05/2012    AST 23 04/05/2012    ALKPHOS 81 04/05/2012    BILITOTAL 0.4 04/05/2012     OTHER:   Lab Results   Component Value Date    PH 7.50 (H) 04/12/2012    ALEXA 7.9 (L) 12/21/2014    PHOS 3.1 10/09/2010    MAG 2.0 04/14/2012    TSH 2.29 05/06/2014    T4 0.88 04/11/2012    SED 14 12/18/2012       The patient's records and results personally reviewed by this provider.     Outside records reviewed from: Care Everywhere (EKG @ [facility])     Labs to be collected @ [facility] on 9/7/21:  BMP, CBC, T&S (to r/o  RBC antibodies), COVID    Labs to be collected on DOS:  T&S      ASSESSMENT and PLAN  Mita Higgins is a 31 y/o female who presents for pre-operative H&P in preparation for Redo supracerebellar infratentorial approach for tumor with lumbar drain placement and endoscopic assistance with Kris Pinzon MD on 9/10/21 at [facility] for treatment of Meningioma (H); Compression of brain (H); Injury of trochlear nerve, unspecified laterality, subsequent encounter.    Pt has had prior anesthetic.  No history of anesthetic complications.    She has the following specific operative considerations:   # DL 1/8 = low risk  # VTE risk: 0.26%  # Risk of PONV score = 3.  If > 2, anti-emetic intervention recommended.  # Anesthesia considerations:  Refer to PAC assessment in anesthesia records    # Increased risk of postoperative nausea/vomiting: Recommend use of antiemetic agents in the perioperative period.        CARDIAC: METS 4,   Activity limited by balance issues     # RCRI : High risk surgery.  0.9% risk of major adverse cardiac event.      PULMONARY:     # Never smoked    # Denies asthma or inhaler use    GI:     # Occasional GERD w/ specific foods, untreated      ENDO: BMI 34    # No DM    ORTHO:     # B/L AVN of hips, s/p B/L hip arthroplasties    NEURO/PSYCH:     # S/P meningioma resection in 2010 w/ subsequent development of obstructive hydrocephalus. Recent imaging shows gradually enlarging enhancing mass.    # S/P left frontal  shunt placement (Strata 1.5) in 2012    # Trochlear nerve injury    # ADHD    # Mild cognitive impairment    # Chronic headaches, taking topamax      Patient is optimized and is acceptable candidate for the proposed procedure. No further diagnostic evaluation is needed.    ** Patient's visit was done virtually today.  A full physical exam was not completed.  Please refer to the physical examination documented by the anesthesiologist in the  anesthesia record on the day of surgery. **      Final plan per anesthesiologist on day of surgery.     Arrival time, NPO, shower and medication instructions provided by nursing staff today.  Preparing For Your Surgery handout given.        On the day of service:     Prep time: 15 minutes  Visit time: 22 minutes  Documentation time: 15 minutes  ------------------------------------------  Total time: 52 minutes      Yanna Nolan PA-C  Preoperative Assessment Center  [facility]  Clinic and Surgery Center  Phone: [phone]  Fax: [phone]

## 2021-08-31 NOTE — H&P (VIEW-ONLY)
Pre-Operative H & P     CC:  Preoperative exam to assess for increased cardiopulmonary risk while undergoing surgery and anesthesia.    Date of Encounter: 8/31/2021  Primary Care Physician:  Mitchel Aldridge     Reason for Visit: Meningioma (H); Compression of brain (H); Injury of trochlear nerve, unspecified laterality, subsequent encounter     Video-Visit Details    Type of service:  Video Visit    Patient verbally consented to video service today: YES      Video Start Time: 0921  Video End Time (time video stopped): 0944    Originating Location (pt. Location): Home    Distant Location (provider location):  [facility]OPER[facility]     Mode of Communication:  Video Conference via [employer]      HPI  Mita Higgins is a 29 y/o female who presents for pre-operative H&P in preparation for Redo supracerebellar infratentorial approach for tumor with lumbar drain placement and endoscopic assistance with Kris Pinzon MD on 9/10/21 at [facility] for treatment of Meningioma (H); Compression of brain (H); Injury of trochlear nerve, unspecified laterality, subsequent encounter. Patient is being evaluated for comorbid conditions of ADHD, GERD, B/L avascular necrosis of hip, s/p B/L hip arthroplasties, obesity.      Ms. Higgins was diagnosed with a large bilateral petroclival meningioma after presenting with headaches. She is here today with her sister. She initially underwent a right pterional craniotomy with Dr. Smith in 2010 for biopsy, which revealed WHO grade 1 meningioma. She then was found with obstructive hydrocephalus and underwent a left frontal  shunt placement (Strata 1.5) with Dr. Jaffe in 2012. Then later in 2012, she underwent a right paramedian supracerebellar, infratentorial approach by Dr. Edwards and Dr. Anderson for debulking. The fourth nerve was cut during this operation in 2012.      Her recent MRI in April 2021 was compared to the  prior MRI from 2018. This demonstrates gradually enlarging enhancing mass in the right ambien and crural cisterns with significant mass effect on the midbrain.      History was obtained from patient & chart review. She is accompanied by her mother.        Hx of abnormal bleeding or anti-platelet use: denies    Menstrual history: No LMP recorded. (Menstrual status: Amenorrhea).:      Prior to Admission Medications  Current Outpatient Medications   Medication Sig Dispense Refill     acetaminophen (TYLENOL) 325 MG tablet Take 2 tablets (650 mg) by mouth every 6 hours (Patient taking differently: Take 650 mg by mouth every 6 hours as needed ) 100 tablet 1     atomoxetine (STRATTERA) 40 MG capsule Take 1 capsule by mouth every morning Hold DOS 12-19       ibuprofen (ADVIL/MOTRIN) 800 MG tablet Take 800 mg by mouth daily as needed        medroxyPROGESTERone (PROVERA) 10 MG tablet Take 10 mg by mouth  (Patient not taking: Reported on 2021)       topiramate (TOPAMAX) 25 MG tablet TAKE ONE TABLET ORALLY AT EACH BEDTIME (Patient taking differently: At Bedtime TAKE ONE TABLET ORALLY AT EACH BEDTIME) 30 tablet 6       Family History  Family History   Problem Relation Age of Onset     Chronic Obstructive Pulmonary Disease Mother      Anesthesia Reaction No family hx of      Cardiovascular No family hx of      Deep Vein Thrombosis (DVT) No family hx of        The complete review of systems is negative other than noted in the HPI or here.       Anesthesia Pre-Procedure Evaluation    Patient: Mita Higgins   MRN: [id number] : 1990        Preoperative Diagnosis: Meningioma (H) [D32.9]  Compression of brain (H) [G93.5]  Injury of trochlear nerve, unspecified laterality, subsequent encounter [S04.20XD]   Procedure : Procedure(s):  Redo supracerebellar infratentorial approach for tumor with lumbar drain placement and endoscopic assistance     Past Medical History:   Diagnosis Date     ADHD (attention deficit hyperactivity  disorder)      Brain tumor (H)      Gastro-oesophageal reflux disease      Meningioma (H)      Meningioma, multiple (H) 9/14/2011     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)      S/P  shunt       Past Surgical History:   Procedure Laterality Date     ARTHROPLASTY HIP  7/30/2013    Procedure: ARTHROPLASTY HIP;  Left Total Hip Replacement ;  Surgeon: Oscar Peña MD;  Location: [facility] OR     ARTHROPLASTY HIP Right 12/19/2014    Procedure: ARTHROPLASTY HIP;  Surgeon: Oscar Peña MD;  Location: [facility] OR     BIOPSY  10/2010    brain tumor     ENT SURGERY      T & A     IMPLANT SHUNT VENTRICULOPERITONEAL  4/5/2012    Procedure:IMPLANT SHUNT VENTRICULOPERITONEAL; Left Ventriculo-Peritoneal Shunt Placement with Stealth Axiom; Surgeon:DWIGHT HOPKINS; Location:[facility] OR     OPTICAL TRACKING SYSTEM CRANIOTOMY, EXCISE TUMOR, COMBINED  4/12/2012    Procedure:COMBINED OPTICAL TRACKING SYSTEM CRANIOTOMY, EXCISE TUMOR; Stealth Assisted Suboccipital Craniotomy and Supracerabellar Infratentorial Approach to Brain Tumor; Surgeon:KEN VAUGHN; Location:[facility] OR      Allergies   Allergen Reactions     Nkda [No Known Drug Allergies]      Seasonal Allergies       Social History     Tobacco Use     Smoking status: Never Smoker     Smokeless tobacco: Never Used   Substance Use Topics     Alcohol use: Yes     Comment: rare---- only on special occasions       Wt Readings from Last 1 Encounters:   06/22/21 96.5 kg (212 lb 11.9 oz)          ROS/MED HX  The complete review of systems is negative other than noted in the HPI or here.  Patient denies recent illness, fever and respiratory infection during past month.  Pt denies steroid use during past year.    ENT/Pulmonary:  - neg pulmonary ROS  (-) tobacco use, asthma and sleep apnea   Neurologic: Comment: S/P meningioma resection in 2010 w/ subsequent development of obstructive hydrocephalus. Recent imaging shows gradually enlarging enhancing mass.    S/P left frontal  shunt placement  (Strata 1.5) in 2012    Trochlear nerve injury    ADHD    Mild cognitive impairment    Chronic headaches, taking topamax     (-) no seizures and no CVA   Cardiovascular:     (+) -----Previous cardiac testing   Echo: Date: Results:    Stress Test: Date: Results:    ECG Reviewed: Date: 5/21/20 Results:   - OTHERWISE NORMAL ECG -   SINUS RHYTHM   RIGHT AXIS DEVIATION  Ventricular rate 65 bpm    Cath: Date: Results:      METS/Exercise Tolerance: 4 - Raking leaves, gardening Comment: Activity limited by balance issues   Hematologic:    (-) history of blood clots and history of blood transfusion   Musculoskeletal: Comment: B/L AVN of hips, s/p B/L hip arthroplasties        GI/Hepatic: Comment: GERD w/ specific foods      (+) GERD,  (-) liver disease   Renal/Genitourinary:  - neg Renal ROS     Endo:  - neg endo ROS  (-) Type II DM   Psychiatric/Substance Use:       Infectious Disease:  - neg infectious disease ROS     Malignancy:  - neg malignancy ROS     Other:  - neg other ROS             Virtual visit -  No vitals were obtained       Physical Exam  Constitutional: Awake, alert, cooperative, no apparent distress, and appears stated age.  Neurologic: Awake, alert, oriented to name, place and time.   Neuropsychiatric: Calm, cooperative. Normal affect. Answers questions appropriately.    ** Patient's visit was done virtually today.  A full physical exam was not completed.  Please refer to the physical examination documented by the anesthesiologist in the anesthesia record on the day of surgery. **        PRIOR LABS/DIAGNOSTIC STUDIES:   All labs and imaging personally reviewed      MR BRAIN W/O & W CONTRAST 4/20/2021   Impression:    1. Multiple presumed meningiomas within the basal cistern, along the   anterior falx, right frontal convexity and left frontotemporal   convexity. Hypoenhancing the most superior part of the basal cisternal   lesion on the right is increased in size compared to 6/4/2019 but   stable compared  with most recent MRI. Indeterminant if this is also a   meningioma or schwannoma which can potentially arise from right 4th   cranial nerve, given its relatively less hypodense enhancement on   postcontrast series. Other potential meningiomas are also stable.   2. Stable left frontal approach ventriculoperitoneal shunt catheter   with unchanged ventricular size and configuration.    EKG 5/21/20   - OTHERWISE NORMAL ECG -   SINUS RHYTHM   RIGHT AXIS DEVIATION  Ventricular rate 65 bpm        CBC:   Lab Results   Component Value Date    WBC 4.5 12/21/2014    WBC 5.0 12/20/2014    HGB 9.2 (L) 12/21/2014    HGB 9.5 (L) 12/20/2014    HCT 26.5 (L) 12/21/2014    HCT 27.5 (L) 12/20/2014     12/21/2014     12/20/2014     BMP:   Lab Results   Component Value Date     12/21/2014     12/20/2014    POTASSIUM 3.8 12/21/2014    POTASSIUM 3.7 12/20/2014    CHLORIDE 104 12/21/2014    CHLORIDE 106 12/20/2014    CO2 29 12/21/2014    CO2 28 12/20/2014    BUN 5 (L) 12/21/2014    BUN 6 (L) 12/20/2014    CR 0.58 12/21/2014    CR 0.65 12/20/2014     (H) 12/21/2014     (H) 12/20/2014     COAGS:   Lab Results   Component Value Date    PTT 21 (L) 04/12/2012    INR 1.35 (H) 12/21/2014     POC:   Lab Results   Component Value Date    HCG Negative 06/04/2019    HCGS Negative 04/05/2012     HEPATIC:   Lab Results   Component Value Date    ALBUMIN 3.0 (L) 04/05/2012    PROTTOTAL 5.5 (L) 04/05/2012    ALT 47 04/05/2012    AST 23 04/05/2012    ALKPHOS 81 04/05/2012    BILITOTAL 0.4 04/05/2012     OTHER:   Lab Results   Component Value Date    PH 7.50 (H) 04/12/2012    ALEXA 7.9 (L) 12/21/2014    PHOS 3.1 10/09/2010    MAG 2.0 04/14/2012    TSH 2.29 05/06/2014    T4 0.88 04/11/2012    SED 14 12/18/2012       The patient's records and results personally reviewed by this provider.     Outside records reviewed from: Care Everywhere (EKG @ [facility])     Labs to be collected @ [facility] on 9/7/21:  BMP, CBC, T&S (to r/o  RBC antibodies), COVID    Labs to be collected on DOS:  T&S      ASSESSMENT and PLAN  Mita Higgins is a 29 y/o female who presents for pre-operative H&P in preparation for Redo supracerebellar infratentorial approach for tumor with lumbar drain placement and endoscopic assistance with Kris Pinzon MD on 9/10/21 at [facility] for treatment of Meningioma (H); Compression of brain (H); Injury of trochlear nerve, unspecified laterality, subsequent encounter.    Pt has had prior anesthetic.  No history of anesthetic complications.    She has the following specific operative considerations:   # DL 1/8 = low risk  # VTE risk: 0.26%  # Risk of PONV score = 3.  If > 2, anti-emetic intervention recommended.  # Anesthesia considerations:  Refer to PAC assessment in anesthesia records    # Increased risk of postoperative nausea/vomiting: Recommend use of antiemetic agents in the perioperative period.        CARDIAC: METS 4,   Activity limited by balance issues     # RCRI : High risk surgery.  0.9% risk of major adverse cardiac event.      PULMONARY:     # Never smoked    # Denies asthma or inhaler use    GI:     # Occasional GERD w/ specific foods, untreated      ENDO: BMI 34    # No DM    ORTHO:     # B/L AVN of hips, s/p B/L hip arthroplasties    NEURO/PSYCH:     # S/P meningioma resection in 2010 w/ subsequent development of obstructive hydrocephalus. Recent imaging shows gradually enlarging enhancing mass.    # S/P left frontal  shunt placement (Strata 1.5) in 2012    # Trochlear nerve injury    # ADHD    # Mild cognitive impairment    # Chronic headaches, taking topamax      Patient is optimized and is acceptable candidate for the proposed procedure. No further diagnostic evaluation is needed.    ** Patient's visit was done virtually today.  A full physical exam was not completed.  Please refer to the physical examination documented by the anesthesiologist in the  anesthesia record on the day of surgery. **      Final plan per anesthesiologist on day of surgery.     Arrival time, NPO, shower and medication instructions provided by nursing staff today.  Preparing For Your Surgery handout given.        On the day of service:     Prep time: 15 minutes  Visit time: 22 minutes  Documentation time: 15 minutes  ------------------------------------------  Total time: 52 minutes      Yanna Nolna PA-C  Preoperative Assessment Center  [facility]  Clinic and Surgery Center  Phone: [phone]  Fax: [phone]

## 2021-08-31 NOTE — TELEPHONE ENCOUNTER
Health Call Center    Phone Message    May a detailed message be left on voicemail: yes     Reason for Call: Patient spoke with [facility] and was told that they will open up on 9/9/21 and that they could send a fax or some documents for a referral.  They are only opening up 13 rooms and patient states mom really needs a room.  Please reach out to patient.    Action Taken: Message routed to:  [facility] ([facility]): Neurosurgery    Travel Screening: Not Applicable

## 2021-08-31 NOTE — TELEPHONE ENCOUNTER
COVID test orders faxed to [facility] at fax # [phone].    Responded to patient's MyChart message re: [facility]. [facility] is still currently closed.

## 2021-08-31 NOTE — PATIENT INSTRUCTIONS
Preparing for Your Surgery      Name:  Mita Higgins   MRN:  [id number]   :  1990   Today's Date:  2021       Arriving for surgery:  Surgery date:  9/10/21  Arrival time:  5AM    Restrictions due to COVID 19:       One visitor is allowed in the Pre Op area. When you go into surgery, one visitor is allowed to wait in the Surgery Waiting Room       (provided there is enough space to social distance).   After surgery- Two visitors are allowed at a time if you have a private room and one visitor is allowed for those in a semi-private room.   Every 4 days the visitor(s) can rotate. During the 4 day period, the visitor(s) must be consistent. No visitors under the age of 18 years old.   Visiting Hours: 8 am - 8:30 pm   No ill visitors.   All visitors must wear face mask.    [employer] parking is available for anyone with mobility limitations or disabilities.  ([place]  24 hours/ 7 days a week; [facility]  7 am- 3:30 pm, Mon- Fri)    Please come to:     [facility] [address]  [address] [place] [address]    When entering the hospital you will be asked COVID screening questions, you will then be directed to Registration.  Registration will direct you to the 3rd floor Surgery waiting room. Please ask if you need an escort or a wheelchair to the Surgery Waiting Room.  Preop number- [phone]?     What can I eat or drink?  -  You may eat and drink normally for up to 8 hours before your surgery. (Until 21, 11:30PM)  -  You may have clear liquids until 2 hours before surgery. (Until 9/10/21, 5AM)    Examples of clear liquids:  Water  Clear broth  Juices (apple, white grape, white cranberry  and cider) without pulp  Noncarbonated, powder based beverages  (lemonade and Bo-Aid)  Sodas (Sprite, 7-Up, ginger ale and seltzer)  Coffee or tea (without milk or cream)  Gatorade    -  No Alcohol for at least 24 hours before surgery     Which medicines  can I take?    Hold Aspirin for 7 days before surgery.   Hold Multivitamins for 7 days before surgery.  Hold Supplements for 7 days before surgery.  Hold Ibuprofen (Advil, Motrin) for 1 day before surgery--unless otherwise directed by surgeon.  Hold Naproxen (Aleve) for 4 days before surgery.    -  DO NOT take these medications the day of surgery:    Atomextine(Strattera)    -  PLEASE TAKE these medications the day of surgery:    Acetaminophen(Tylenol) as needed    How do I prepare myself?  - Please take 2 showers before surgery using Scrubcare or Hibiclens soap.    Use this soap only from the neck to your toes.     Leave the soap on your skin for one minute--then rinse thoroughly.      You may use your own shampoo and conditioner; no other hair products.   - Please remove all jewelry and body piercings.  - No lotions, deodorants or fragrance.  - No makeup or fingernail polish.   - Bring your ID and insurance card.    -If you have a Deep Brain Stimulator, Spinal Cord Stimulator or any neuro stimulator device---you must bring the remote control to the hospital     - All patients are required to have a Covid-19 test within 4 days of surgery/procedure.      -Patients will be contacted by the [facility] scheduling team within 1 week of surgery to make an appointment.      - Patients may call the Scheduling team at [phone] if they have not been scheduled within 4 days of  surgery.          Questions or Concerns:    - For any questions regarding the day of surgery or your hospital stay, please contact the Pre Admission Nursing Office at [phone].       - If you have health changes between today and your surgery please call your surgeon.       For questions after surgery please call your surgeons office.

## 2021-08-31 NOTE — ANESTHESIA PREPROCEDURE EVALUATION
Anesthesia Pre-Procedure Evaluation    Patient: Mita Higgins   MRN: [id number] : 1990        Preoperative Diagnosis: Meningioma (H) [D32.9]  Compression of brain (H) [G93.5]  Injury of trochlear nerve, unspecified laterality, subsequent encounter [S04.20XD]   Procedure : Procedure(s):  Redo supracerebellar infratentorial approach for tumor with lumbar drain placement and endoscopic assistance     Past Medical History:   Diagnosis Date     ADHD (attention deficit hyperactivity disorder)      Brain tumor (H)      Gastro-oesophageal reflux disease      Meningioma (H)      Meningioma, multiple (H) 2011     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)      S/P  shunt       Past Surgical History:   Procedure Laterality Date     ARTHROPLASTY HIP  2013    Procedure: ARTHROPLASTY HIP;  Left Total Hip Replacement ;  Surgeon: Oscar Peña MD;  Location: [facility]     ARTHROPLASTY HIP Right 2014    Procedure: ARTHROPLASTY HIP;  Surgeon: Oscar Peña MD;  Location: [facility]     BIOPSY  10/2010    brain tumor     ENT SURGERY      T & A     IMPLANT SHUNT VENTRICULOPERITONEAL  2012    Procedure:IMPLANT SHUNT VENTRICULOPERITONEAL; Left Ventriculo-Peritoneal Shunt Placement with Stealth Axiom; Surgeon:DWIGHT HOPKINS; Location:[facility]     OPTICAL TRACKING SYSTEM CRANIOTOMY, EXCISE TUMOR, COMBINED  2012    Procedure:COMBINED OPTICAL TRACKING SYSTEM CRANIOTOMY, EXCISE TUMOR; Stealth Assisted Suboccipital Craniotomy and Supracerabellar Infratentorial Approach to Brain Tumor; Surgeon:KEN VAUGHN; Location:[facility]      Allergies   Allergen Reactions     Nkda [No Known Drug Allergies]      Seasonal Allergies       Social History     Tobacco Use     Smoking status: Never Smoker     Smokeless tobacco: Never Used   Substance Use Topics     Alcohol use: Yes     Comment: rare---- only on special occasions       Wt Readings from Last 1 Encounters:   21 96.5 kg (212 lb 11.9 oz)        Anesthesia  Evaluation   Pt has had prior anesthetic. Type: General.    No history of anesthetic complications       ROS/MED HX  ENT/Pulmonary:  - neg pulmonary ROS  (-) tobacco use, asthma and sleep apnea   Neurologic: Comment: S/P meningioma resection in 2010 w/ subsequent development of obstructive hydrocephalus. Recent imaging shows gradually enlarging enhancing mass.    S/P left frontal  shunt placement (Strata 1.5) in 2012    Trochlear nerve injury    ADHD    Mild cognitive impairment    Chronic headaches, taking topamax      (+) migraines,  (-) no seizures and no CVA   Cardiovascular:     (+) -----Previous cardiac testing   Echo: Date: Results:    Stress Test: Date: Results:    ECG Reviewed: Date: 5/21/20 Results:   - OTHERWISE NORMAL ECG -   SINUS RHYTHM   RIGHT AXIS DEVIATION  Ventricular rate 65 bpm    Cath: Date: Results:      METS/Exercise Tolerance: 4 - Raking leaves, gardening Comment: Activity limited by balance issues   Hematologic:    (-) history of blood clots and history of blood transfusion   Musculoskeletal: Comment: B/L AVN of hips, s/p B/L hip arthroplasties        GI/Hepatic: Comment: GERD w/ specific foods      (+) GERD, Other,  (-) liver disease   Renal/Genitourinary:  - neg Renal ROS     Endo:  - neg endo ROS  (-) Type II DM   Psychiatric/Substance Use:     (+) psychiatric history other (comment) (ADHD)     Infectious Disease:  - neg infectious disease ROS     Malignancy:  - neg malignancy ROS     Other:  - neg other ROS          Physical Exam    Airway        Mallampati: I   TM distance: > 3 FB   Neck ROM: full     Respiratory Devices and Support         Dental  no notable dental history         Cardiovascular   cardiovascular exam normal          Pulmonary   pulmonary exam normal                OUTSIDE LABS:  CBC:   Lab Results   Component Value Date    WBC 4.5 12/21/2014    WBC 5.0 12/20/2014    HGB 9.2 (L) 12/21/2014    HGB 9.5 (L) 12/20/2014    HCT 26.5 (L) 12/21/2014    HCT 27.5 (L) 12/20/2014      12/21/2014     12/20/2014     BMP:   Lab Results   Component Value Date     12/21/2014     12/20/2014    POTASSIUM 3.8 12/21/2014    POTASSIUM 3.7 12/20/2014    CHLORIDE 104 12/21/2014    CHLORIDE 106 12/20/2014    CO2 29 12/21/2014    CO2 28 12/20/2014    BUN 5 (L) 12/21/2014    BUN 6 (L) 12/20/2014    CR 0.58 12/21/2014    CR 0.65 12/20/2014     (H) 12/21/2014     (H) 12/20/2014     COAGS:   Lab Results   Component Value Date    PTT 21 (L) 04/12/2012    INR 1.35 (H) 12/21/2014     POC:   Lab Results   Component Value Date    HCG Negative 06/04/2019    HCGS Negative 04/05/2012     HEPATIC:   Lab Results   Component Value Date    ALBUMIN 3.0 (L) 04/05/2012    PROTTOTAL 5.5 (L) 04/05/2012    ALT 47 04/05/2012    AST 23 04/05/2012    ALKPHOS 81 04/05/2012    BILITOTAL 0.4 04/05/2012     OTHER:   Lab Results   Component Value Date    PH 7.50 (H) 04/12/2012    ALEXA 7.9 (L) 12/21/2014    PHOS 3.1 10/09/2010    MAG 2.0 04/14/2012    TSH 2.29 05/06/2014    T4 0.88 04/11/2012    SED 14 12/18/2012       Anesthesia Plan    ASA Status:  2   NPO Status:  NPO Appropriate    Anesthesia Type: General.     - Airway: ETT   Induction: Intravenous.   Maintenance: Inhalation.   Techniques and Equipment:     - Airway: Oral KIMO, Shoulder Jame/Ramp     - Lines/Monitors: 2nd IV, Arterial Line, Central Line     Consents    Anesthesia Plan(s) and associated risks, benefits, and realistic alternatives discussed. Questions answered and patient/representative(s) expressed understanding.     - Discussed with:  Patient         Postoperative Care    Pain management: Multi-modal analgesia.   PONV prophylaxis: Ondansetron (or other 5HT-3), Dexamethasone or Solumedrol     Comments:              PAC Discussion and Assessment    ASA Classification: 3  Case is suitable for: [place]  Anesthetic techniques and relevant risks discussed: GA  Invasive monitoring and risk discussed: No    Possibility and Risk of  blood transfusion discussed: No            PAC Resident/NP Anesthesia Assessment: Mita Higgins is a 29 y/o female who presents for pre-operative H&P in preparation for Redo supracerebellar infratentorial approach for tumor with lumbar drain placement and endoscopic assistance with Kris Pinzon MD on 9/10/21 at [facility] for treatment of Meningioma (H); Compression of brain (H); Injury of trochlear nerve, unspecified laterality, subsequent encounter.    Pt has had prior anesthetic.  No history of anesthetic complications.    She has the following specific operative considerations:   # DL 1/8 = low risk  # VTE risk: 0.26%  # Risk of PONV score = 3.  If > 2, anti-emetic intervention recommended.  # Anesthesia considerations:  Refer to PAC assessment in anesthesia records    # Increased risk of postoperative nausea/vomiting: Recommend use of antiemetic agents in the perioperative period.        CARDIAC: METS 4,   Activity limited by balance issues     # RCRI : High risk surgery.  0.9% risk of major adverse cardiac event.      PULMONARY:     # Never smoked    # Denies asthma or inhaler use    GI:     # Occasional GERD w/ specific foods, untreated      ENDO: BMI 34    # No DM    ORTHO:     # B/L AVN of hips, s/p B/L hip arthroplasties    NEURO/PSYCH:     # S/P meningioma resection in 2010 w/ subsequent development of obstructive hydrocephalus. Recent imaging shows gradually enlarging enhancing mass.    # S/P left frontal  shunt placement (Strata 1.5) in 2012    # Trochlear nerve injury    # ADHD    # Mild cognitive impairment    # Chronic headaches, taking topamax      Patient is optimized and is acceptable candidate for the proposed procedure. No further diagnostic evaluation is needed.    ** Patient's visit was done virtually today.  A full physical exam was not completed.  Please refer to the physical examination documented by the anesthesiologist in the anesthesia  record on the day of surgery. **      Final plan per anesthesiologist on day of surgery.     Reviewed and Signed by PAC Mid-Level Provider/Resident  Mid-Level Provider/Resident: Yanna Nolan PA-C  Date: 8/31/21  Time: 1006                               Yanna Nolan PA-C

## 2021-08-31 NOTE — TELEPHONE ENCOUNTER
Attn: Barb Bhat, RNCC for Dr. Pinzon     *Patient MyChart message seeking referral to [facility].     Neelima Coyle LPN  Neurosurgery

## 2021-09-02 NOTE — TELEPHONE ENCOUNTER
Spoke with patient over the phone about additional lodging options. Writer contacted social work team, pt may qualify for lodging, meals, and transportation through her county because of her [place]. Informed patient SW will reach out to her to discuss options, encouraged her to call back if she needs further support with this.    Barb Bhat, RN  RN Care Coordinator, Skull Base Surgery  Direct: [phone]

## 2021-09-03 ENCOUNTER — PATIENT OUTREACH (OUTPATIENT)
Dept: CARE COORDINATION | Facility: CLINIC | Age: 31
End: 2021-09-03

## 2021-09-07 ENCOUNTER — TRANSFERRED RECORDS (OUTPATIENT)
Dept: HEALTH INFORMATION MANAGEMENT | Facility: CLINIC | Age: 31
End: 2021-09-07

## 2021-09-07 NOTE — PROGRESS NOTES
Social Work Intervention  [facility] [place]    Data/Intervention:    Patient Name:  Mita Higgins  /Age:  1990 (30 year old)    Visit Type: telephone  Referral Source: Barb Bhat RN  Reason for Referral:  Lodging/meals    Collaborated With:    -Mita and her Mother Adelaida Bertrand  -Barb Bhat Rn  -Jennifer Schaffer -Accommodations [facility]  -Valeria, financial worker [facility] [phone]    Psychosocial Information/Concerns:  Mita and her Mother will be transported here by a friend. Pt has appt early 21 so [facility]  and then Mom  needs  hotel arrangements from  until Pt is discharged and can return home. We contacted their county Friday 9/3 and their worker was off but she called me today. Valeria authorized mileage, parking, meals and hotel start 2021 thru 9/15/2021. She emailed to me and I forwarded to Mita the form that needs to be completed and instructed them on keeping all receipts. Informed also on costs approved for each meal and that Mom can get some of her meals in the hospital cafeteria.   They were planning to make reservations for the [facility] hotel that has a shuttle to the hospital. The [facility] access hospital will reimburse only $50/day for that.   Mom is Pt's PCA and SILS worker. They have a [facility] for the services.  I also forwarded to Pt via email a verbal CLAY form should Mom need to call in after surgery with questions, etc.     Intervention/Education/Resources Provided:  Advocated and educated on the Critical access hospital financial resources for hotel, meals, mileage.     Assessment/Plan:  They are pleased with the arrangements and are aware they can contact me as needed.      Provided patient/family with contact information and availability.    DELONTE Burt, [facility]    [facility] [place]  [phone]

## 2021-09-09 ENCOUNTER — HOSPITAL ENCOUNTER (OUTPATIENT)
Dept: MRI IMAGING | Facility: CLINIC | Age: 31
Discharge: HOME | DRG: 025 | End: 2021-09-09
Attending: NEUROLOGICAL SURGERY | Admitting: NEUROLOGICAL SURGERY
Payer: MEDICARE

## 2021-09-09 DIAGNOSIS — G93.5 COMPRESSION OF BRAIN (H): ICD-10-CM

## 2021-09-09 DIAGNOSIS — D32.9 MENINGIOMA (H): ICD-10-CM

## 2021-09-09 PROCEDURE — 70552 MRI BRAIN STEM W/DYE: CPT | Mod: 26 | Performed by: RADIOLOGY

## 2021-09-09 PROCEDURE — A9585 GADOBUTROL INJECTION: HCPCS | Performed by: NEUROLOGICAL SURGERY

## 2021-09-09 PROCEDURE — 255N000002 HC RX 255 OP 636: Performed by: NEUROLOGICAL SURGERY

## 2021-09-09 PROCEDURE — G1004 CDSM NDSC: HCPCS | Performed by: RADIOLOGY

## 2021-09-09 PROCEDURE — 70552 MRI BRAIN STEM W/DYE: CPT | Mod: MG

## 2021-09-09 RX ORDER — GADOBUTROL 604.72 MG/ML
10 INJECTION INTRAVENOUS ONCE
Status: COMPLETED | OUTPATIENT
Start: 2021-09-09 | End: 2021-09-09

## 2021-09-09 RX ADMIN — GADOBUTROL 9.5 ML: 604.72 INJECTION INTRAVENOUS at 07:33

## 2021-09-09 NOTE — PROGRESS NOTES
NSGSHUNT: This patient has a ventriculoperitoneal shunt with a Medtronic Strata valve. The valve is set at 1.5. The pressure was checked today and found to be 2 after the MRI, it was adjusted back to 1.5 verified x3.     Eveline Stokes  Neurosurgery

## 2021-09-09 NOTE — ADDENDUM NOTE
Addendum  created 09/09/21 0632 by Yanna Nolan PA-C    Order list changed, Order sets accessed

## 2021-09-10 ENCOUNTER — ANESTHESIA (OUTPATIENT)
Dept: SURGERY | Facility: CLINIC | Age: 31
DRG: 025 | End: 2021-09-10
Payer: MEDICARE

## 2021-09-10 ENCOUNTER — HOSPITAL ENCOUNTER (OUTPATIENT)
Dept: CT IMAGING | Facility: CLINIC | Age: 31
DRG: 025 | End: 2021-09-10
Attending: NEUROLOGICAL SURGERY | Admitting: NEUROLOGICAL SURGERY
Payer: MEDICARE

## 2021-09-10 ENCOUNTER — APPOINTMENT (OUTPATIENT)
Dept: GENERAL RADIOLOGY | Facility: CLINIC | Age: 31
DRG: 025 | End: 2021-09-10
Attending: NEUROLOGICAL SURGERY
Payer: MEDICARE

## 2021-09-10 ENCOUNTER — HOSPITAL ENCOUNTER (INPATIENT)
Facility: CLINIC | Age: 31
LOS: 10 days | Discharge: NURSING, REHAB OR LONG-TERM CARE | DRG: 025 | End: 2021-09-20
Attending: NEUROLOGICAL SURGERY | Admitting: NEUROLOGICAL SURGERY
Payer: MEDICARE

## 2021-09-10 ENCOUNTER — APPOINTMENT (OUTPATIENT)
Dept: GENERAL RADIOLOGY | Facility: CLINIC | Age: 31
DRG: 025 | End: 2021-09-10
Attending: ANESTHESIOLOGY
Payer: MEDICARE

## 2021-09-10 DIAGNOSIS — S04.20XD: ICD-10-CM

## 2021-09-10 DIAGNOSIS — D32.9 MENINGIOMA (H): ICD-10-CM

## 2021-09-10 DIAGNOSIS — D42.9 MENINGIOMA, MULTIPLE (H): ICD-10-CM

## 2021-09-10 DIAGNOSIS — R62.50 DEVELOPMENT DELAY: ICD-10-CM

## 2021-09-10 DIAGNOSIS — G93.5 COMPRESSION OF BRAIN (H): ICD-10-CM

## 2021-09-10 DIAGNOSIS — Z98.2 S/P VP SHUNT: Primary | ICD-10-CM

## 2021-09-10 DIAGNOSIS — R51.9 CHRONIC DAILY HEADACHE: ICD-10-CM

## 2021-09-10 LAB
ABO/RH(D): NORMAL
ANION GAP SERPL CALCULATED.3IONS-SCNC: 10 MMOL/L (ref 3–14)
ANTIBODY SCREEN: NEGATIVE
BASE EXCESS BLDA CALC-SCNC: -6.5 MMOL/L (ref -9.6–2)
BASE EXCESS BLDA CALC-SCNC: -6.6 MMOL/L (ref -9.6–2)
BASE EXCESS BLDA CALC-SCNC: -8.4 MMOL/L (ref -9.6–2)
BLD PROD TYP BPU: NORMAL
BLD PROD TYP BPU: NORMAL
BLOOD COMPONENT TYPE: NORMAL
BLOOD COMPONENT TYPE: NORMAL
BUN SERPL-MCNC: 9 MG/DL (ref 7–30)
CA-I BLD-MCNC: 4.2 MG/DL (ref 4.4–5.2)
CA-I BLD-MCNC: 4.4 MG/DL (ref 4.4–5.2)
CA-I BLD-MCNC: 4.6 MG/DL (ref 4.4–5.2)
CALCIUM SERPL-MCNC: 9.2 MG/DL (ref 8.5–10.1)
CHLORIDE BLD-SCNC: 106 MMOL/L (ref 94–109)
CO2 SERPL-SCNC: 24 MMOL/L (ref 20–32)
CODING SYSTEM: NORMAL
CODING SYSTEM: NORMAL
CREAT SERPL-MCNC: 0.83 MG/DL (ref 0.52–1.04)
CROSSMATCH: NORMAL
CROSSMATCH: NORMAL
ERYTHROCYTE [DISTWIDTH] IN BLOOD BY AUTOMATED COUNT: 12.1 % (ref 10–15)
GFR SERPL CREATININE-BSD FRML MDRD: >90 ML/MIN/1.73M2
GLUCOSE BLD-MCNC: 104 MG/DL (ref 70–99)
GLUCOSE BLD-MCNC: 168 MG/DL (ref 70–99)
GLUCOSE BLD-MCNC: 176 MG/DL (ref 70–99)
GLUCOSE BLD-MCNC: 177 MG/DL (ref 70–99)
GLUCOSE BLDC GLUCOMTR-MCNC: 92 MG/DL (ref 70–99)
HCO3 BLDA-SCNC: 17 MMOL/L (ref 21–28)
HCO3 BLDA-SCNC: 18 MMOL/L (ref 21–28)
HCO3 BLDA-SCNC: 20 MMOL/L (ref 21–28)
HCT VFR BLD AUTO: 40.5 % (ref 35–47)
HGB BLD-MCNC: 12.2 G/DL (ref 11.7–15.7)
HGB BLD-MCNC: 12.9 G/DL (ref 11.7–15.7)
HGB BLD-MCNC: 13.4 G/DL (ref 11.7–15.7)
HGB BLD-MCNC: 14 G/DL (ref 11.7–15.7)
ISSUE DATE AND TIME: NORMAL
ISSUE DATE AND TIME: NORMAL
LACTATE BLD-SCNC: 1.9 MMOL/L
LACTATE BLD-SCNC: 2.1 MMOL/L
LACTATE BLD-SCNC: 2.4 MMOL/L
MCH RBC QN AUTO: 29.5 PG (ref 26.5–33)
MCHC RBC AUTO-ENTMCNC: 34.6 G/DL (ref 31.5–36.5)
MCV RBC AUTO: 85 FL (ref 78–100)
OXYHGB MFR BLDA: 99 % (ref 92–100)
PCO2 BLDA: 32 MM HG (ref 35–45)
PCO2 BLDA: 36 MM HG (ref 35–45)
PCO2 BLDA: 41 MM HG (ref 35–45)
PH BLDA: 7.29 [PH] (ref 7.35–7.45)
PH BLDA: 7.29 [PH] (ref 7.35–7.45)
PH BLDA: 7.35 [PH] (ref 7.35–7.45)
PLATELET # BLD AUTO: 253 10E3/UL (ref 150–450)
PO2 BLDA: 188 MM HG (ref 80–105)
PO2 BLDA: 193 MM HG (ref 80–105)
PO2 BLDA: 461 MM HG (ref 80–105)
POTASSIUM BLD-SCNC: 3.4 MMOL/L (ref 3.5–5)
POTASSIUM BLD-SCNC: 3.7 MMOL/L (ref 3.5–5)
POTASSIUM BLD-SCNC: 3.9 MMOL/L (ref 3.5–5)
POTASSIUM BLD-SCNC: 4 MMOL/L (ref 3.4–5.3)
RBC # BLD AUTO: 4.75 10E6/UL (ref 3.8–5.2)
SODIUM BLD-SCNC: 138 MMOL/L (ref 133–144)
SODIUM BLD-SCNC: 139 MMOL/L (ref 133–144)
SODIUM BLD-SCNC: 139 MMOL/L (ref 133–144)
SODIUM SERPL-SCNC: 140 MMOL/L (ref 133–144)
SPECIMEN EXPIRATION DATE: NORMAL
UNIT ABO/RH: NORMAL
UNIT ABO/RH: NORMAL
UNIT NUMBER: NORMAL
UNIT NUMBER: NORMAL
UNIT STATUS: NORMAL
UNIT STATUS: NORMAL
UNIT TYPE ISBT: 5100
UNIT TYPE ISBT: 5100
WBC # BLD AUTO: 6.1 10E3/UL (ref 4–11)

## 2021-09-10 PROCEDURE — 00Q20ZZ REPAIR DURA MATER, OPEN APPROACH: ICD-10-PCS | Performed by: NEUROLOGICAL SURGERY

## 2021-09-10 PROCEDURE — 36415 COLL VENOUS BLD VENIPUNCTURE: CPT | Performed by: PHYSICIAN ASSISTANT

## 2021-09-10 PROCEDURE — 999N000015 HC STATISTIC ARTERIAL MONITORING DAILY

## 2021-09-10 PROCEDURE — 4A123FZ MONITORING OF CARDIAC RHYTHM, PERCUTANEOUS APPROACH: ICD-10-PCS | Performed by: NEUROLOGICAL SURGERY

## 2021-09-10 PROCEDURE — 272N000002 HC OR SUPPLY OTHER OPNP: Performed by: NEUROLOGICAL SURGERY

## 2021-09-10 PROCEDURE — 250N000011 HC RX IP 250 OP 636: Performed by: ANESTHESIOLOGY

## 2021-09-10 PROCEDURE — 258N000003 HC RX IP 258 OP 636: Performed by: STUDENT IN AN ORGANIZED HEALTH CARE EDUCATION/TRAINING PROGRAM

## 2021-09-10 PROCEDURE — 88342 IMHCHEM/IMCYTCHM 1ST ANTB: CPT | Mod: TC | Performed by: NEUROLOGICAL SURGERY

## 2021-09-10 PROCEDURE — 71045 X-RAY EXAM CHEST 1 VIEW: CPT | Mod: 26 | Performed by: RADIOLOGY

## 2021-09-10 PROCEDURE — 999N000065 XR CHEST PORT 1 VIEW

## 2021-09-10 PROCEDURE — 00BC0ZX EXCISION OF CEREBELLUM, OPEN APPROACH, DIAGNOSTIC: ICD-10-PCS | Performed by: NEUROLOGICAL SURGERY

## 2021-09-10 PROCEDURE — 999N000179 XR SURGERY CARM FLUORO LESS THAN 5 MIN W STILLS: Mod: TC

## 2021-09-10 PROCEDURE — 250N000012 HC RX MED GY IP 250 OP 636 PS 637: Performed by: NEUROLOGICAL SURGERY

## 2021-09-10 PROCEDURE — 250N000011 HC RX IP 250 OP 636: Performed by: NEUROLOGICAL SURGERY

## 2021-09-10 PROCEDURE — 82330 ASSAY OF CALCIUM: CPT

## 2021-09-10 PROCEDURE — 200N000002 HC R&B ICU UMMC

## 2021-09-10 PROCEDURE — 85027 COMPLETE CBC AUTOMATED: CPT | Performed by: PHYSICIAN ASSISTANT

## 2021-09-10 PROCEDURE — 250N000025 HC SEVOFLURANE, PER MIN: Performed by: NEUROLOGICAL SURGERY

## 2021-09-10 PROCEDURE — 258N000003 HC RX IP 258 OP 636: Performed by: NURSE ANESTHETIST, CERTIFIED REGISTERED

## 2021-09-10 PROCEDURE — 272N000004 HC RX 272: Performed by: NEUROLOGICAL SURGERY

## 2021-09-10 PROCEDURE — 250N000009 HC RX 250: Performed by: STUDENT IN AN ORGANIZED HEALTH CARE EDUCATION/TRAINING PROGRAM

## 2021-09-10 PROCEDURE — 80048 BASIC METABOLIC PNL TOTAL CA: CPT | Performed by: PHYSICIAN ASSISTANT

## 2021-09-10 PROCEDURE — 82803 BLOOD GASES ANY COMBINATION: CPT

## 2021-09-10 PROCEDURE — 00U20KZ SUPPLEMENT DURA MATER WITH NONAUTOLOGOUS TISSUE SUBSTITUTE, OPEN APPROACH: ICD-10-PCS | Performed by: NEUROLOGICAL SURGERY

## 2021-09-10 PROCEDURE — P9016 RBC LEUKOCYTES REDUCED: HCPCS | Performed by: NEUROLOGICAL SURGERY

## 2021-09-10 PROCEDURE — 61781 SCAN PROC CRANIAL INTRA: CPT | Performed by: NEUROLOGICAL SURGERY

## 2021-09-10 PROCEDURE — C1713 ANCHOR/SCREW BN/BN,TIS/BN: HCPCS | Performed by: NEUROLOGICAL SURGERY

## 2021-09-10 PROCEDURE — 62272 THER SPI PNXR DRG CSF: CPT | Mod: 51 | Performed by: NEUROLOGICAL SURGERY

## 2021-09-10 PROCEDURE — 86901 BLOOD TYPING SEROLOGIC RH(D): CPT | Performed by: STUDENT IN AN ORGANIZED HEALTH CARE EDUCATION/TRAINING PROGRAM

## 2021-09-10 PROCEDURE — 250N000011 HC RX IP 250 OP 636: Performed by: STUDENT IN AN ORGANIZED HEALTH CARE EDUCATION/TRAINING PROGRAM

## 2021-09-10 PROCEDURE — 250N000011 HC RX IP 250 OP 636: Performed by: NURSE ANESTHETIST, CERTIFIED REGISTERED

## 2021-09-10 PROCEDURE — 360N000079 HC SURGERY LEVEL 6, PER MIN: Performed by: NEUROLOGICAL SURGERY

## 2021-09-10 PROCEDURE — 999N000141 HC STATISTIC PRE-PROCEDURE NURSING ASSESSMENT: Performed by: NEUROLOGICAL SURGERY

## 2021-09-10 PROCEDURE — 272N000001 HC OR GENERAL SUPPLY STERILE: Performed by: NEUROLOGICAL SURGERY

## 2021-09-10 PROCEDURE — 70498 CT ANGIOGRAPHY NECK: CPT | Mod: MG

## 2021-09-10 PROCEDURE — C1762 CONN TISS, HUMAN(INC FASCIA): HCPCS | Performed by: NEUROLOGICAL SURGERY

## 2021-09-10 PROCEDURE — 82810 BLOOD GASES O2 SAT ONLY: CPT

## 2021-09-10 PROCEDURE — 00B00ZX EXCISION OF BRAIN, OPEN APPROACH, DIAGNOSTIC: ICD-10-PCS | Performed by: NEUROLOGICAL SURGERY

## 2021-09-10 PROCEDURE — C1763 CONN TISS, NON-HUMAN: HCPCS | Performed by: NEUROLOGICAL SURGERY

## 2021-09-10 PROCEDURE — 999N000157 HC STATISTIC RCP TIME EA 10 MIN

## 2021-09-10 PROCEDURE — 710N000010 HC RECOVERY PHASE 1, LEVEL 2, PER MIN: Performed by: NEUROLOGICAL SURGERY

## 2021-09-10 PROCEDURE — 88360 TUMOR IMMUNOHISTOCHEM/MANUAL: CPT | Mod: TC | Performed by: NEUROLOGICAL SURGERY

## 2021-09-10 PROCEDURE — 70496 CT ANGIOGRAPHY HEAD: CPT | Mod: 26 | Performed by: RADIOLOGY

## 2021-09-10 PROCEDURE — 278N000051 HC OR IMPLANT GENERAL: Performed by: NEUROLOGICAL SURGERY

## 2021-09-10 PROCEDURE — 69990 MICROSURGERY ADD-ON: CPT | Mod: 59 | Performed by: NEUROLOGICAL SURGERY

## 2021-09-10 PROCEDURE — G1004 CDSM NDSC: HCPCS | Mod: GC | Performed by: RADIOLOGY

## 2021-09-10 PROCEDURE — 61519 REMOVE BRAIN LINING LESION: CPT | Mod: GC | Performed by: NEUROLOGICAL SURGERY

## 2021-09-10 PROCEDURE — 8E09XBZ COMPUTER ASSISTED PROCEDURE OF HEAD AND NECK REGION: ICD-10-PCS | Performed by: NEUROLOGICAL SURGERY

## 2021-09-10 PROCEDURE — 250N000013 HC RX MED GY IP 250 OP 250 PS 637: Performed by: STUDENT IN AN ORGANIZED HEALTH CARE EDUCATION/TRAINING PROGRAM

## 2021-09-10 PROCEDURE — 70450 CT HEAD/BRAIN W/O DYE: CPT | Mod: MG

## 2021-09-10 PROCEDURE — 4A1034G MONITORING OF CENTRAL NERVOUS ELECTRICAL ACTIVITY, INTRAOPERATIVE, PERCUTANEOUS APPROACH: ICD-10-PCS | Performed by: NEUROLOGICAL SURGERY

## 2021-09-10 PROCEDURE — 370N000017 HC ANESTHESIA TECHNICAL FEE, PER MIN: Performed by: NEUROLOGICAL SURGERY

## 2021-09-10 PROCEDURE — 70498 CT ANGIOGRAPHY NECK: CPT | Mod: 26 | Performed by: RADIOLOGY

## 2021-09-10 PROCEDURE — 86923 COMPATIBILITY TEST ELECTRIC: CPT | Performed by: NEUROLOGICAL SURGERY

## 2021-09-10 PROCEDURE — 250N000009 HC RX 250: Performed by: NEUROLOGICAL SURGERY

## 2021-09-10 DEVICE — DURA 62105 SUBSTITUTE DUREPAIR 3X3IN NCE
Type: IMPLANTABLE DEVICE | Site: CRANIAL | Status: FUNCTIONAL
Brand: DUREPAIR®

## 2021-09-10 DEVICE — IMP PLATE SYN BOX LOW PROFILE 10X16MM 04H TI 421.521: Type: IMPLANTABLE DEVICE | Site: CRANIAL | Status: FUNCTIONAL

## 2021-09-10 DEVICE — IMP SCR SYN MATRIX LOW PRO 1.5X04MM SELF DRILL 04.503.104.01: Type: IMPLANTABLE DEVICE | Site: CRANIAL | Status: FUNCTIONAL

## 2021-09-10 DEVICE — IMP BUR HOLE COVER 24MM LOW PROFILE TI 421.528: Type: IMPLANTABLE DEVICE | Site: CRANIAL | Status: FUNCTIONAL

## 2021-09-10 DEVICE — IMP PLATE SYN X LOW PROFILE 4H TI 421.510: Type: IMPLANTABLE DEVICE | Site: CRANIAL | Status: FUNCTIONAL

## 2021-09-10 RX ORDER — NOREPINEPHRINE BITARTRATE 0.06 MG/ML
.01-.6 INJECTION, SOLUTION INTRAVENOUS CONTINUOUS
Status: DISCONTINUED | OUTPATIENT
Start: 2021-09-10 | End: 2021-09-10 | Stop reason: HOSPADM

## 2021-09-10 RX ORDER — PROPOFOL 10 MG/ML
INJECTION, EMULSION INTRAVENOUS PRN
Status: DISCONTINUED | OUTPATIENT
Start: 2021-09-10 | End: 2021-09-10

## 2021-09-10 RX ORDER — HYDROMORPHONE HCL IN WATER/PF 6 MG/30 ML
0.2 PATIENT CONTROLLED ANALGESIA SYRINGE INTRAVENOUS EVERY 5 MIN PRN
Status: DISCONTINUED | OUTPATIENT
Start: 2021-09-10 | End: 2021-09-11 | Stop reason: HOSPADM

## 2021-09-10 RX ORDER — SODIUM CHLORIDE, SODIUM LACTATE, POTASSIUM CHLORIDE, CALCIUM CHLORIDE 600; 310; 30; 20 MG/100ML; MG/100ML; MG/100ML; MG/100ML
INJECTION, SOLUTION INTRAVENOUS CONTINUOUS PRN
Status: DISCONTINUED | OUTPATIENT
Start: 2021-09-10 | End: 2021-09-10

## 2021-09-10 RX ORDER — ACETAMINOPHEN 325 MG/1
975 TABLET ORAL ONCE
Status: COMPLETED | OUTPATIENT
Start: 2021-09-10 | End: 2021-09-10

## 2021-09-10 RX ORDER — PROPOFOL 10 MG/ML
INJECTION, EMULSION INTRAVENOUS CONTINUOUS PRN
Status: DISCONTINUED | OUTPATIENT
Start: 2021-09-10 | End: 2021-09-10

## 2021-09-10 RX ORDER — FENTANYL CITRATE 50 UG/ML
INJECTION, SOLUTION INTRAMUSCULAR; INTRAVENOUS PRN
Status: DISCONTINUED | OUTPATIENT
Start: 2021-09-10 | End: 2021-09-10

## 2021-09-10 RX ORDER — GLYCOPYRROLATE 0.2 MG/ML
INJECTION, SOLUTION INTRAMUSCULAR; INTRAVENOUS PRN
Status: DISCONTINUED | OUTPATIENT
Start: 2021-09-10 | End: 2021-09-10

## 2021-09-10 RX ORDER — CEFAZOLIN SODIUM 2 G/100ML
2 INJECTION, SOLUTION INTRAVENOUS
Status: COMPLETED | OUTPATIENT
Start: 2021-09-10 | End: 2021-09-10

## 2021-09-10 RX ORDER — LIDOCAINE HYDROCHLORIDE 20 MG/ML
INJECTION, SOLUTION INFILTRATION; PERINEURAL PRN
Status: DISCONTINUED | OUTPATIENT
Start: 2021-09-10 | End: 2021-09-10

## 2021-09-10 RX ORDER — SODIUM CHLORIDE 9 MG/ML
INJECTION, SOLUTION INTRAVENOUS CONTINUOUS
Status: ACTIVE | OUTPATIENT
Start: 2021-09-10 | End: 2021-09-11

## 2021-09-10 RX ORDER — LABETALOL HYDROCHLORIDE 5 MG/ML
10 INJECTION, SOLUTION INTRAVENOUS
Status: DISCONTINUED | OUTPATIENT
Start: 2021-09-10 | End: 2021-09-11 | Stop reason: HOSPADM

## 2021-09-10 RX ORDER — SODIUM CHLORIDE, SODIUM LACTATE, POTASSIUM CHLORIDE, CALCIUM CHLORIDE 600; 310; 30; 20 MG/100ML; MG/100ML; MG/100ML; MG/100ML
INJECTION, SOLUTION INTRAVENOUS CONTINUOUS
Status: DISCONTINUED | OUTPATIENT
Start: 2021-09-10 | End: 2021-09-11 | Stop reason: HOSPADM

## 2021-09-10 RX ORDER — PAPAVERINE HYDROCHLORIDE 30 MG/ML
INJECTION INTRAMUSCULAR; INTRAVENOUS PRN
Status: DISCONTINUED | OUTPATIENT
Start: 2021-09-10 | End: 2021-09-10 | Stop reason: HOSPADM

## 2021-09-10 RX ORDER — LIDOCAINE 40 MG/G
CREAM TOPICAL
Status: DISCONTINUED | OUTPATIENT
Start: 2021-09-10 | End: 2021-09-10 | Stop reason: HOSPADM

## 2021-09-10 RX ORDER — PHENYLEPHRINE HCL IN 0.9% NACL 50MG/250ML
.1-1 PLASTIC BAG, INJECTION (ML) INTRAVENOUS CONTINUOUS
Status: DISCONTINUED | OUTPATIENT
Start: 2021-09-10 | End: 2021-09-10 | Stop reason: HOSPADM

## 2021-09-10 RX ORDER — FENTANYL CITRATE 50 UG/ML
25 INJECTION, SOLUTION INTRAMUSCULAR; INTRAVENOUS EVERY 5 MIN PRN
Status: DISCONTINUED | OUTPATIENT
Start: 2021-09-10 | End: 2021-09-11 | Stop reason: HOSPADM

## 2021-09-10 RX ORDER — DEXAMETHASONE SODIUM PHOSPHATE 4 MG/ML
INJECTION, SOLUTION INTRA-ARTICULAR; INTRALESIONAL; INTRAMUSCULAR; INTRAVENOUS; SOFT TISSUE PRN
Status: DISCONTINUED | OUTPATIENT
Start: 2021-09-10 | End: 2021-09-10

## 2021-09-10 RX ORDER — SODIUM CHLORIDE 9 MG/ML
INJECTION, SOLUTION INTRAVENOUS CONTINUOUS PRN
Status: DISCONTINUED | OUTPATIENT
Start: 2021-09-10 | End: 2021-09-10

## 2021-09-10 RX ORDER — SODIUM CHLORIDE, SODIUM LACTATE, POTASSIUM CHLORIDE, CALCIUM CHLORIDE 600; 310; 30; 20 MG/100ML; MG/100ML; MG/100ML; MG/100ML
INJECTION, SOLUTION INTRAVENOUS CONTINUOUS
Status: DISCONTINUED | OUTPATIENT
Start: 2021-09-10 | End: 2021-09-10 | Stop reason: HOSPADM

## 2021-09-10 RX ORDER — ESMOLOL HYDROCHLORIDE 10 MG/ML
INJECTION INTRAVENOUS PRN
Status: DISCONTINUED | OUTPATIENT
Start: 2021-09-10 | End: 2021-09-10

## 2021-09-10 RX ORDER — MEPERIDINE HYDROCHLORIDE 25 MG/ML
12.5 INJECTION INTRAMUSCULAR; INTRAVENOUS; SUBCUTANEOUS
Status: DISCONTINUED | OUTPATIENT
Start: 2021-09-10 | End: 2021-09-11 | Stop reason: HOSPADM

## 2021-09-10 RX ORDER — HYDRALAZINE HYDROCHLORIDE 20 MG/ML
2.5-5 INJECTION INTRAMUSCULAR; INTRAVENOUS EVERY 10 MIN PRN
Status: DISCONTINUED | OUTPATIENT
Start: 2021-09-10 | End: 2021-09-11 | Stop reason: HOSPADM

## 2021-09-10 RX ORDER — ONDANSETRON 4 MG/1
4 TABLET, ORALLY DISINTEGRATING ORAL EVERY 30 MIN PRN
Status: DISCONTINUED | OUTPATIENT
Start: 2021-09-10 | End: 2021-09-11 | Stop reason: HOSPADM

## 2021-09-10 RX ORDER — ONDANSETRON 2 MG/ML
4 INJECTION INTRAMUSCULAR; INTRAVENOUS EVERY 30 MIN PRN
Status: DISCONTINUED | OUTPATIENT
Start: 2021-09-10 | End: 2021-09-11 | Stop reason: HOSPADM

## 2021-09-10 RX ORDER — OXYCODONE HYDROCHLORIDE 5 MG/1
5 TABLET ORAL EVERY 4 HOURS PRN
Status: DISCONTINUED | OUTPATIENT
Start: 2021-09-10 | End: 2021-09-11 | Stop reason: HOSPADM

## 2021-09-10 RX ORDER — IOPAMIDOL 755 MG/ML
75 INJECTION, SOLUTION INTRAVASCULAR ONCE
Status: COMPLETED | OUTPATIENT
Start: 2021-09-10 | End: 2021-09-10

## 2021-09-10 RX ORDER — CEFAZOLIN SODIUM 2 G/100ML
2 INJECTION, SOLUTION INTRAVENOUS SEE ADMIN INSTRUCTIONS
Status: DISCONTINUED | OUTPATIENT
Start: 2021-09-10 | End: 2021-09-10 | Stop reason: HOSPADM

## 2021-09-10 RX ORDER — LEVETIRACETAM 10 MG/ML
1000 INJECTION INTRAVASCULAR ONCE
Status: COMPLETED | OUTPATIENT
Start: 2021-09-10 | End: 2021-09-10

## 2021-09-10 RX ORDER — EPHEDRINE SULFATE 50 MG/ML
INJECTION, SOLUTION INTRAMUSCULAR; INTRAVENOUS; SUBCUTANEOUS PRN
Status: DISCONTINUED | OUTPATIENT
Start: 2021-09-10 | End: 2021-09-10

## 2021-09-10 RX ADMIN — IOPAMIDOL 75 ML: 755 INJECTION, SOLUTION INTRAVENOUS at 07:51

## 2021-09-10 RX ADMIN — ROCURONIUM BROMIDE 10 MG: 10 INJECTION INTRAVENOUS at 09:39

## 2021-09-10 RX ADMIN — PHENYLEPHRINE HYDROCHLORIDE 150 MCG: 10 INJECTION INTRAVENOUS at 10:54

## 2021-09-10 RX ADMIN — REMIFENTANIL HYDROCHLORIDE 0.1 MCG/KG/MIN: 1 INJECTION, POWDER, LYOPHILIZED, FOR SOLUTION INTRAVENOUS at 12:00

## 2021-09-10 RX ADMIN — Medication 0.05 MCG/KG/MIN: at 12:00

## 2021-09-10 RX ADMIN — SODIUM CHLORIDE, POTASSIUM CHLORIDE, SODIUM LACTATE AND CALCIUM CHLORIDE: 600; 310; 30; 20 INJECTION, SOLUTION INTRAVENOUS at 18:43

## 2021-09-10 RX ADMIN — PROPOFOL 125 MCG/KG/MIN: 10 INJECTION, EMULSION INTRAVENOUS at 15:08

## 2021-09-10 RX ADMIN — DEXAMETHASONE SODIUM PHOSPHATE 8 MG: 4 INJECTION, SOLUTION INTRA-ARTICULAR; INTRALESIONAL; INTRAMUSCULAR; INTRAVENOUS; SOFT TISSUE at 09:43

## 2021-09-10 RX ADMIN — ROCURONIUM BROMIDE 20 MG: 10 INJECTION INTRAVENOUS at 09:01

## 2021-09-10 RX ADMIN — GLYCOPYRROLATE 0.2 MG: 0.2 INJECTION, SOLUTION INTRAMUSCULAR; INTRAVENOUS at 10:40

## 2021-09-10 RX ADMIN — Medication 5 MG: at 10:40

## 2021-09-10 RX ADMIN — PHENYLEPHRINE HYDROCHLORIDE 100 MCG: 10 INJECTION INTRAVENOUS at 16:01

## 2021-09-10 RX ADMIN — FENTANYL CITRATE 100 MCG: 50 INJECTION, SOLUTION INTRAMUSCULAR; INTRAVENOUS at 21:55

## 2021-09-10 RX ADMIN — PROPOFOL 50 MG: 10 INJECTION, EMULSION INTRAVENOUS at 09:39

## 2021-09-10 RX ADMIN — PROPOFOL 50 MCG/KG/MIN: 10 INJECTION, EMULSION INTRAVENOUS at 12:00

## 2021-09-10 RX ADMIN — LEVETIRACETAM 1000 MG: 10 INJECTION INTRAVENOUS at 11:45

## 2021-09-10 RX ADMIN — PROPOFOL 150 MG: 10 INJECTION, EMULSION INTRAVENOUS at 08:21

## 2021-09-10 RX ADMIN — GLYCOPYRROLATE 0.2 MG: 0.2 INJECTION, SOLUTION INTRAMUSCULAR; INTRAVENOUS at 10:39

## 2021-09-10 RX ADMIN — SODIUM CHLORIDE, POTASSIUM CHLORIDE, SODIUM LACTATE AND CALCIUM CHLORIDE: 600; 310; 30; 20 INJECTION, SOLUTION INTRAVENOUS at 09:05

## 2021-09-10 RX ADMIN — LIDOCAINE HYDROCHLORIDE 100 MG: 20 INJECTION, SOLUTION INFILTRATION; PERINEURAL at 08:20

## 2021-09-10 RX ADMIN — ROCURONIUM BROMIDE 30 MG: 10 INJECTION INTRAVENOUS at 08:20

## 2021-09-10 RX ADMIN — PHENYLEPHRINE HYDROCHLORIDE 100 MCG: 10 INJECTION INTRAVENOUS at 14:31

## 2021-09-10 RX ADMIN — FENTANYL CITRATE 50 MCG: 50 INJECTION, SOLUTION INTRAMUSCULAR; INTRAVENOUS at 08:20

## 2021-09-10 RX ADMIN — Medication 5 MG: at 10:10

## 2021-09-10 RX ADMIN — PROPOFOL 50 MG: 10 INJECTION, EMULSION INTRAVENOUS at 08:20

## 2021-09-10 RX ADMIN — ESMOLOL HYDROCHLORIDE 30 MG: 10 INJECTION, SOLUTION INTRAVENOUS at 11:01

## 2021-09-10 RX ADMIN — Medication 2 G: at 10:30

## 2021-09-10 RX ADMIN — SODIUM CHLORIDE: 9 INJECTION, SOLUTION INTRAVENOUS at 10:00

## 2021-09-10 RX ADMIN — PHENYLEPHRINE HYDROCHLORIDE 100 MCG: 10 INJECTION INTRAVENOUS at 10:51

## 2021-09-10 RX ADMIN — ROCURONIUM BROMIDE 20 MG: 10 INJECTION INTRAVENOUS at 12:40

## 2021-09-10 RX ADMIN — PROPOFOL 50 MG: 10 INJECTION, EMULSION INTRAVENOUS at 12:00

## 2021-09-10 RX ADMIN — FENTANYL CITRATE 50 MCG: 50 INJECTION, SOLUTION INTRAMUSCULAR; INTRAVENOUS at 11:01

## 2021-09-10 RX ADMIN — PROPOFOL 30 MG: 10 INJECTION, EMULSION INTRAVENOUS at 14:13

## 2021-09-10 RX ADMIN — PROPOFOL 100 MCG/KG/MIN: 10 INJECTION, EMULSION INTRAVENOUS at 20:26

## 2021-09-10 RX ADMIN — REMIFENTANIL HYDROCHLORIDE 0.1 MCG/KG/MIN: 1 INJECTION, POWDER, LYOPHILIZED, FOR SOLUTION INTRAVENOUS at 18:06

## 2021-09-10 RX ADMIN — FENTANYL CITRATE 50 MCG: 50 INJECTION, SOLUTION INTRAMUSCULAR; INTRAVENOUS at 11:00

## 2021-09-10 RX ADMIN — Medication 2 G: at 16:25

## 2021-09-10 RX ADMIN — Medication 2 G: at 20:25

## 2021-09-10 RX ADMIN — Medication 2 G: at 12:25

## 2021-09-10 RX ADMIN — PHENYLEPHRINE HYDROCHLORIDE 150 MCG: 10 INJECTION INTRAVENOUS at 12:19

## 2021-09-10 RX ADMIN — SODIUM CHLORIDE: 9 INJECTION, SOLUTION INTRAVENOUS at 23:17

## 2021-09-10 RX ADMIN — HYDROMORPHONE HYDROCHLORIDE 0.5 MG: 1 INJECTION, SOLUTION INTRAMUSCULAR; INTRAVENOUS; SUBCUTANEOUS at 21:17

## 2021-09-10 RX ADMIN — FENTANYL CITRATE 50 MCG: 50 INJECTION, SOLUTION INTRAMUSCULAR; INTRAVENOUS at 10:48

## 2021-09-10 RX ADMIN — ACETAMINOPHEN 975 MG: 325 TABLET, FILM COATED ORAL at 07:36

## 2021-09-10 RX ADMIN — SODIUM CHLORIDE, POTASSIUM CHLORIDE, SODIUM LACTATE AND CALCIUM CHLORIDE: 600; 310; 30; 20 INJECTION, SOLUTION INTRAVENOUS at 08:14

## 2021-09-10 RX ADMIN — MIDAZOLAM 2 MG: 1 INJECTION INTRAMUSCULAR; INTRAVENOUS at 08:20

## 2021-09-10 ASSESSMENT — MIFFLIN-ST. JEOR: SCORE: 1700.63

## 2021-09-10 NOTE — ANESTHESIA PROCEDURE NOTES
Airway       Patient location during procedure: OR       Procedure Start/Stop Times: 9/10/2021 8:30 AM  Staff -        Anesthesiologist:  Bert Eid MD       Resident/Fellow: Goldberg, Leslie, MD       Performed By: residentIndications and Patient Condition       Indications for airway management: agapito-procedural       Induction type:intravenous       Mask difficulty assessment: 2 - vent by mask + OA or adjuvant +/- NMBA    Final Airway Details       Final airway type: endotracheal airway       Successful airway: ETT - single and Reinforced tube  Endotracheal Airway Details        ETT size (mm): 7.0       Successful intubation technique: direct laryngoscopy       DL Blade Type: MAC 4       Grade View of Cords: 2       Adjucts: stylet       Position: Center       Measured from: gums/teeth       Secured at (cm): 22       Bite block used: Molar    Post intubation assessment        Number of attempts at approach: 2       Number of other approaches attempted: 0       Secured with: pink tape       Ease of procedure: easy       Dentition: Intact and Unchanged

## 2021-09-10 NOTE — PROGRESS NOTES
SPIRITUAL HEALTH SERVICES  [facility] 3C   PRE-SURGERY VISIT    Had pre-surgery visit with pt.  Provided spiritual support, prayer.     Rev. Joanna Bowen MDiv, [facility]  Staff    Pager [phone]

## 2021-09-10 NOTE — PROGRESS NOTES
Brief Neurosurgery Note:    The ventriculoperitoneal shunt Medtronic Strata Valve was dialled down to 0.5 before the procedure.    Brandon Patterson

## 2021-09-10 NOTE — ANESTHESIA PROCEDURE NOTES
Arterial Line Procedure Note  Pre-Procedure   Staff -        Anesthesiologist:  Bert Eid MD       Performed By: anesthesiologist       Location: OR       Pre-Anesthestic Checklist: patient identified, IV checked, risks and benefits discussed, informed consent, monitors and equipment checked, pre-op evaluation and at physician/surgeon's request  Timeout:       Correct Patient: Yes        Correct Procedure: Yes        Correct Site: Yes        Correct Position: Yes   Procedure   Procedure: arterial line       Laterality: right       Insertion Site: radial.  Sterile Prep        Standard elements of sterile barrier followed       Skin prep: Chloraprep  Insertion/Injection        Technique: ultrasound guided        1. Ultrasound was used to evaluate the access site.       2. Artery evaluated via ultrasound for patency/adequacy.       3. Using real-time ultrasound the needle/catheter was observed entering the artery/vein.       Catheter Type/Size: 20 G, 1.75 in/4.5 cm quick cath (integral wire)  Narrative        Tegaderm dressing used.       Complications: None apparent,        Arterial waveform: Yes        IBP within 10% of NIBP: Yes

## 2021-09-10 NOTE — ANESTHESIA PROCEDURE NOTES
Central Line/PA Catheter Placement  Pre-Procedure   Staff -        Anesthesiologist:  Bert Eid MD       Resident/Fellow: Goldberg, Leslie, MD       Performed By: resident       Location: OR       Pre-Anesthestic Checklist: patient identified, IV checked, site marked, risks and benefits discussed, informed consent, monitors and equipment checked, pre-op evaluation and at physician/surgeon's request  Timeout:       Correct Patient: Yes        Correct Procedure: Yes        Correct Site: Yes        Correct Position: Yes        Correct Laterality: Yes     Procedure   Procedure: central line       Laterality: right       Insertion Site: internal jugular.       Patient Position: Trendelenburg  Sterile Prep        All elements of maximal sterile barrier technique followed       Patient Prep/Sterile Barriers: draped, hand hygiene, gloves , hat , mask , draped, gown, sterile gel and probe cover       Skin prep: Chloraprep  Insertion/Injection        Technique: ultrasound guided        1. Ultrasound was used to evaluate the access site.       2. Vein evaluated via ultrasound for patency/adequacy.       3. Using real-time ultrasound the needle/catheter was observed entering the artery/vein.       Catheter Type/Size: 7 Fr, 20 cm, 3-lumen  Narrative         Secured by: suture       Biopatch and Tegaderm dressing used.       Complications: None apparent,        blood aspirated from all lumens,        All lumens flushed: Yes       Verification method: Placement to be verified post-op       Tip termination: right atrium

## 2021-09-11 ENCOUNTER — APPOINTMENT (OUTPATIENT)
Dept: MRI IMAGING | Facility: CLINIC | Age: 31
DRG: 025 | End: 2021-09-11
Attending: STUDENT IN AN ORGANIZED HEALTH CARE EDUCATION/TRAINING PROGRAM
Payer: MEDICARE

## 2021-09-11 ENCOUNTER — APPOINTMENT (OUTPATIENT)
Dept: CT IMAGING | Facility: CLINIC | Age: 31
DRG: 025 | End: 2021-09-11
Attending: STUDENT IN AN ORGANIZED HEALTH CARE EDUCATION/TRAINING PROGRAM
Payer: MEDICARE

## 2021-09-11 LAB
ALBUMIN UR-MCNC: NEGATIVE MG/DL
ANION GAP SERPL CALCULATED.3IONS-SCNC: 9 MMOL/L (ref 3–14)
APPEARANCE UR: CLEAR
BASE EXCESS BLDV CALC-SCNC: -3.2 MMOL/L (ref -7.7–1.9)
BILIRUB UR QL STRIP: NEGATIVE
BUN SERPL-MCNC: 5 MG/DL (ref 7–30)
CA-I BLD-MCNC: 4.7 MG/DL (ref 4.4–5.2)
CALCIUM SERPL-MCNC: 8 MG/DL (ref 8.5–10.1)
CHLORIDE BLD-SCNC: 109 MMOL/L (ref 94–109)
CO2 SERPL-SCNC: 22 MMOL/L (ref 20–32)
COLOR UR AUTO: ABNORMAL
CREAT SERPL-MCNC: 0.7 MG/DL (ref 0.52–1.04)
ERYTHROCYTE [DISTWIDTH] IN BLOOD BY AUTOMATED COUNT: 11.9 % (ref 10–15)
GFR SERPL CREATININE-BSD FRML MDRD: >90 ML/MIN/1.73M2
GLUCOSE BLD-MCNC: 130 MG/DL (ref 70–99)
GLUCOSE BLDC GLUCOMTR-MCNC: 121 MG/DL (ref 70–99)
GLUCOSE BLDC GLUCOMTR-MCNC: 130 MG/DL (ref 70–99)
GLUCOSE BLDC GLUCOMTR-MCNC: 136 MG/DL (ref 70–99)
GLUCOSE BLDC GLUCOMTR-MCNC: 142 MG/DL (ref 70–99)
GLUCOSE BLDC GLUCOMTR-MCNC: 144 MG/DL (ref 70–99)
GLUCOSE UR STRIP-MCNC: NEGATIVE MG/DL
HCO3 BLDV-SCNC: 22 MMOL/L (ref 21–28)
HCT VFR BLD AUTO: 35 % (ref 35–47)
HGB BLD-MCNC: 12.1 G/DL (ref 11.7–15.7)
HGB UR QL STRIP: ABNORMAL
KETONES UR STRIP-MCNC: NEGATIVE MG/DL
LEUKOCYTE ESTERASE UR QL STRIP: NEGATIVE
MAGNESIUM SERPL-MCNC: 1.6 MG/DL (ref 1.6–2.3)
MCH RBC QN AUTO: 30 PG (ref 26.5–33)
MCHC RBC AUTO-ENTMCNC: 34.6 G/DL (ref 31.5–36.5)
MCV RBC AUTO: 87 FL (ref 78–100)
NITRATE UR QL: NEGATIVE
O2/TOTAL GAS SETTING VFR VENT: 35 %
PCO2 BLDV: 41 MM HG (ref 40–50)
PH BLDV: 7.35 [PH] (ref 7.32–7.43)
PH UR STRIP: 6.5 [PH] (ref 5–7)
PHOSPHATE SERPL-MCNC: 1.8 MG/DL (ref 2.5–4.5)
PHOSPHATE SERPL-MCNC: 1.9 MG/DL (ref 2.5–4.5)
PLATELET # BLD AUTO: 218 10E3/UL (ref 150–450)
PO2 BLDV: 47 MM HG (ref 25–47)
POTASSIUM BLD-SCNC: 3.6 MMOL/L (ref 3.4–5.3)
POTASSIUM BLD-SCNC: 3.6 MMOL/L (ref 3.4–5.3)
RBC # BLD AUTO: 4.03 10E6/UL (ref 3.8–5.2)
RBC URINE: 1 /HPF
SODIUM SERPL-SCNC: 140 MMOL/L (ref 133–144)
SP GR UR STRIP: 1 (ref 1–1.03)
SP GR UR STRIP: 1.01 (ref 1–1.03)
SP GR UR STRIP: 1.01 (ref 1–1.03)
SQUAMOUS EPITHELIAL: 2 /HPF
UROBILINOGEN UR STRIP-MCNC: NORMAL MG/DL
WBC # BLD AUTO: 10.7 10E3/UL (ref 4–11)
WBC URINE: 2 /HPF

## 2021-09-11 PROCEDURE — 250N000011 HC RX IP 250 OP 636: Performed by: NEUROLOGICAL SURGERY

## 2021-09-11 PROCEDURE — 80048 BASIC METABOLIC PNL TOTAL CA: CPT | Performed by: STUDENT IN AN ORGANIZED HEALTH CARE EDUCATION/TRAINING PROGRAM

## 2021-09-11 PROCEDURE — 85027 COMPLETE CBC AUTOMATED: CPT | Performed by: STUDENT IN AN ORGANIZED HEALTH CARE EDUCATION/TRAINING PROGRAM

## 2021-09-11 PROCEDURE — 83735 ASSAY OF MAGNESIUM: CPT | Performed by: STUDENT IN AN ORGANIZED HEALTH CARE EDUCATION/TRAINING PROGRAM

## 2021-09-11 PROCEDURE — 70450 CT HEAD/BRAIN W/O DYE: CPT

## 2021-09-11 PROCEDURE — 82330 ASSAY OF CALCIUM: CPT | Performed by: STUDENT IN AN ORGANIZED HEALTH CARE EDUCATION/TRAINING PROGRAM

## 2021-09-11 PROCEDURE — 255N000002 HC RX 255 OP 636: Performed by: NEUROLOGICAL SURGERY

## 2021-09-11 PROCEDURE — 93005 ELECTROCARDIOGRAM TRACING: CPT

## 2021-09-11 PROCEDURE — 70553 MRI BRAIN STEM W/O & W/DYE: CPT | Mod: 26 | Performed by: RADIOLOGY

## 2021-09-11 PROCEDURE — 84100 ASSAY OF PHOSPHORUS: CPT | Performed by: STUDENT IN AN ORGANIZED HEALTH CARE EDUCATION/TRAINING PROGRAM

## 2021-09-11 PROCEDURE — 84100 ASSAY OF PHOSPHORUS: CPT | Performed by: NEUROLOGICAL SURGERY

## 2021-09-11 PROCEDURE — A9585 GADOBUTROL INJECTION: HCPCS | Performed by: NEUROLOGICAL SURGERY

## 2021-09-11 PROCEDURE — 93010 ELECTROCARDIOGRAM REPORT: CPT | Performed by: INTERNAL MEDICINE

## 2021-09-11 PROCEDURE — 999N000015 HC STATISTIC ARTERIAL MONITORING DAILY

## 2021-09-11 PROCEDURE — 999N000157 HC STATISTIC RCP TIME EA 10 MIN

## 2021-09-11 PROCEDURE — 250N000011 HC RX IP 250 OP 636: Performed by: STUDENT IN AN ORGANIZED HEALTH CARE EDUCATION/TRAINING PROGRAM

## 2021-09-11 PROCEDURE — 200N000002 HC R&B ICU UMMC

## 2021-09-11 PROCEDURE — 82803 BLOOD GASES ANY COMBINATION: CPT | Performed by: STUDENT IN AN ORGANIZED HEALTH CARE EDUCATION/TRAINING PROGRAM

## 2021-09-11 PROCEDURE — 250N000009 HC RX 250: Performed by: NEUROLOGICAL SURGERY

## 2021-09-11 PROCEDURE — 70553 MRI BRAIN STEM W/O & W/DYE: CPT

## 2021-09-11 PROCEDURE — 81003 URINALYSIS AUTO W/O SCOPE: CPT | Performed by: STUDENT IN AN ORGANIZED HEALTH CARE EDUCATION/TRAINING PROGRAM

## 2021-09-11 PROCEDURE — 81001 URINALYSIS AUTO W/SCOPE: CPT | Performed by: STUDENT IN AN ORGANIZED HEALTH CARE EDUCATION/TRAINING PROGRAM

## 2021-09-11 PROCEDURE — 99291 CRITICAL CARE FIRST HOUR: CPT | Mod: GC | Performed by: PSYCHIATRY & NEUROLOGY

## 2021-09-11 PROCEDURE — 258N000003 HC RX IP 258 OP 636: Performed by: STUDENT IN AN ORGANIZED HEALTH CARE EDUCATION/TRAINING PROGRAM

## 2021-09-11 PROCEDURE — 70450 CT HEAD/BRAIN W/O DYE: CPT | Mod: 26 | Performed by: RADIOLOGY

## 2021-09-11 PROCEDURE — 258N000003 HC RX IP 258 OP 636: Performed by: NEUROLOGICAL SURGERY

## 2021-09-11 PROCEDURE — 94002 VENT MGMT INPAT INIT DAY: CPT

## 2021-09-11 RX ORDER — PROCHLORPERAZINE MALEATE 10 MG
10 TABLET ORAL EVERY 6 HOURS PRN
Status: DISCONTINUED | OUTPATIENT
Start: 2021-09-11 | End: 2021-09-20 | Stop reason: HOSPADM

## 2021-09-11 RX ORDER — DEXAMETHASONE 2 MG/1
2 TABLET ORAL EVERY 8 HOURS SCHEDULED
Status: DISCONTINUED | OUTPATIENT
Start: 2021-09-15 | End: 2021-09-11

## 2021-09-11 RX ORDER — AMOXICILLIN 250 MG
1 CAPSULE ORAL 2 TIMES DAILY
Status: DISCONTINUED | OUTPATIENT
Start: 2021-09-11 | End: 2021-09-15

## 2021-09-11 RX ORDER — DEXAMETHASONE 1 MG
1 TABLET ORAL EVERY 6 HOURS
Status: DISCONTINUED | OUTPATIENT
Start: 2021-09-17 | End: 2021-09-11

## 2021-09-11 RX ORDER — OXYCODONE HYDROCHLORIDE 5 MG/1
5 TABLET ORAL EVERY 4 HOURS PRN
Status: DISCONTINUED | OUTPATIENT
Start: 2021-09-11 | End: 2021-09-20 | Stop reason: HOSPADM

## 2021-09-11 RX ORDER — POTASSIUM CHLORIDE 29.8 MG/ML
20 INJECTION INTRAVENOUS ONCE
Status: COMPLETED | OUTPATIENT
Start: 2021-09-11 | End: 2021-09-11

## 2021-09-11 RX ORDER — LIDOCAINE 40 MG/G
CREAM TOPICAL
Status: DISCONTINUED | OUTPATIENT
Start: 2021-09-11 | End: 2021-09-20 | Stop reason: HOSPADM

## 2021-09-11 RX ORDER — POLYETHYLENE GLYCOL 3350 17 G/17G
17 POWDER, FOR SOLUTION ORAL DAILY
Status: DISCONTINUED | OUTPATIENT
Start: 2021-09-11 | End: 2021-09-15

## 2021-09-11 RX ORDER — BISACODYL 10 MG
10 SUPPOSITORY, RECTAL RECTAL DAILY PRN
Status: DISCONTINUED | OUTPATIENT
Start: 2021-09-11 | End: 2021-09-20 | Stop reason: HOSPADM

## 2021-09-11 RX ORDER — MAGNESIUM SULFATE HEPTAHYDRATE 40 MG/ML
2 INJECTION, SOLUTION INTRAVENOUS ONCE
Status: COMPLETED | OUTPATIENT
Start: 2021-09-11 | End: 2021-09-11

## 2021-09-11 RX ORDER — ONDANSETRON 2 MG/ML
4 INJECTION INTRAMUSCULAR; INTRAVENOUS EVERY 6 HOURS
Status: DISCONTINUED | OUTPATIENT
Start: 2021-09-11 | End: 2021-09-13

## 2021-09-11 RX ORDER — ACETAMINOPHEN 325 MG/1
650 TABLET ORAL EVERY 4 HOURS PRN
Status: DISCONTINUED | OUTPATIENT
Start: 2021-09-14 | End: 2021-09-20 | Stop reason: HOSPADM

## 2021-09-11 RX ORDER — DEXAMETHASONE 1 MG
1 TABLET ORAL EVERY 6 HOURS
Status: DISCONTINUED | OUTPATIENT
Start: 2021-09-21 | End: 2021-09-11

## 2021-09-11 RX ORDER — DEXAMETHASONE 1.5 MG/1
3 TABLET ORAL EVERY 8 HOURS SCHEDULED
Status: DISCONTINUED | OUTPATIENT
Start: 2021-09-13 | End: 2021-09-11

## 2021-09-11 RX ORDER — ONDANSETRON 4 MG/1
4 TABLET, ORALLY DISINTEGRATING ORAL EVERY 6 HOURS PRN
Status: DISCONTINUED | OUTPATIENT
Start: 2021-09-11 | End: 2021-09-11

## 2021-09-11 RX ORDER — NALOXONE HYDROCHLORIDE 0.4 MG/ML
0.4 INJECTION, SOLUTION INTRAMUSCULAR; INTRAVENOUS; SUBCUTANEOUS
Status: DISCONTINUED | OUTPATIENT
Start: 2021-09-11 | End: 2021-09-11

## 2021-09-11 RX ORDER — HYDROMORPHONE HCL IN WATER/PF 6 MG/30 ML
0.4 PATIENT CONTROLLED ANALGESIA SYRINGE INTRAVENOUS
Status: DISPENSED | OUTPATIENT
Start: 2021-09-11 | End: 2021-09-12

## 2021-09-11 RX ORDER — GADOBUTROL 604.72 MG/ML
0.1 INJECTION INTRAVENOUS ONCE
Status: COMPLETED | OUTPATIENT
Start: 2021-09-11 | End: 2021-09-11

## 2021-09-11 RX ORDER — NALOXONE HYDROCHLORIDE 0.4 MG/ML
0.2 INJECTION, SOLUTION INTRAMUSCULAR; INTRAVENOUS; SUBCUTANEOUS
Status: DISCONTINUED | OUTPATIENT
Start: 2021-09-11 | End: 2021-09-11

## 2021-09-11 RX ORDER — TOPIRAMATE 25 MG/1
25 TABLET, FILM COATED ORAL AT BEDTIME
Status: DISCONTINUED | OUTPATIENT
Start: 2021-09-11 | End: 2021-09-20 | Stop reason: HOSPADM

## 2021-09-11 RX ORDER — DEXAMETHASONE 2 MG/1
2 TABLET ORAL EVERY 8 HOURS SCHEDULED
Status: DISCONTINUED | OUTPATIENT
Start: 2021-09-17 | End: 2021-09-11

## 2021-09-11 RX ORDER — DEXAMETHASONE 2 MG/1
2 TABLET ORAL EVERY 8 HOURS SCHEDULED
Status: COMPLETED | OUTPATIENT
Start: 2021-09-15 | End: 2021-09-16

## 2021-09-11 RX ORDER — DEXAMETHASONE SODIUM PHOSPHATE 4 MG/ML
4 INJECTION, SOLUTION INTRA-ARTICULAR; INTRALESIONAL; INTRAMUSCULAR; INTRAVENOUS; SOFT TISSUE DAILY
Status: DISCONTINUED | OUTPATIENT
Start: 2021-09-11 | End: 2021-09-11

## 2021-09-11 RX ORDER — ATOMOXETINE 10 MG/1
40 CAPSULE ORAL EVERY MORNING
Status: DISCONTINUED | OUTPATIENT
Start: 2021-09-11 | End: 2021-09-20 | Stop reason: HOSPADM

## 2021-09-11 RX ORDER — HYDROMORPHONE HCL IN WATER/PF 6 MG/30 ML
0.2 PATIENT CONTROLLED ANALGESIA SYRINGE INTRAVENOUS
Status: DISPENSED | OUTPATIENT
Start: 2021-09-11 | End: 2021-09-12

## 2021-09-11 RX ORDER — OXYCODONE HYDROCHLORIDE 10 MG/1
10 TABLET ORAL EVERY 4 HOURS PRN
Status: DISCONTINUED | OUTPATIENT
Start: 2021-09-11 | End: 2021-09-20 | Stop reason: HOSPADM

## 2021-09-11 RX ORDER — DEXAMETHASONE SODIUM PHOSPHATE 4 MG/ML
4 INJECTION, SOLUTION INTRA-ARTICULAR; INTRALESIONAL; INTRAMUSCULAR; INTRAVENOUS; SOFT TISSUE EVERY 8 HOURS SCHEDULED
Status: COMPLETED | OUTPATIENT
Start: 2021-09-11 | End: 2021-09-12

## 2021-09-11 RX ORDER — ONDANSETRON 2 MG/ML
4 INJECTION INTRAMUSCULAR; INTRAVENOUS EVERY 6 HOURS PRN
Status: DISCONTINUED | OUTPATIENT
Start: 2021-09-11 | End: 2021-09-11

## 2021-09-11 RX ORDER — NALOXONE HYDROCHLORIDE 0.4 MG/ML
0.2 INJECTION, SOLUTION INTRAMUSCULAR; INTRAVENOUS; SUBCUTANEOUS
Status: DISCONTINUED | OUTPATIENT
Start: 2021-09-11 | End: 2021-09-20 | Stop reason: HOSPADM

## 2021-09-11 RX ORDER — DEXAMETHASONE 1 MG
1 TABLET ORAL EVERY 6 HOURS SCHEDULED
Status: COMPLETED | OUTPATIENT
Start: 2021-09-17 | End: 2021-09-19

## 2021-09-11 RX ORDER — LABETALOL HYDROCHLORIDE 5 MG/ML
10-40 INJECTION, SOLUTION INTRAVENOUS EVERY 10 MIN PRN
Status: DISCONTINUED | OUTPATIENT
Start: 2021-09-11 | End: 2021-09-20 | Stop reason: HOSPADM

## 2021-09-11 RX ORDER — DEXAMETHASONE 1.5 MG/1
3 TABLET ORAL EVERY 8 HOURS SCHEDULED
Status: DISCONTINUED | OUTPATIENT
Start: 2021-09-14 | End: 2021-09-11

## 2021-09-11 RX ORDER — ONDANSETRON 4 MG/1
4 TABLET, ORALLY DISINTEGRATING ORAL EVERY 6 HOURS
Status: DISCONTINUED | OUTPATIENT
Start: 2021-09-11 | End: 2021-09-13

## 2021-09-11 RX ORDER — NALOXONE HYDROCHLORIDE 0.4 MG/ML
0.4 INJECTION, SOLUTION INTRAMUSCULAR; INTRAVENOUS; SUBCUTANEOUS
Status: DISCONTINUED | OUTPATIENT
Start: 2021-09-11 | End: 2021-09-20 | Stop reason: HOSPADM

## 2021-09-11 RX ORDER — ACETAMINOPHEN 325 MG/1
975 TABLET ORAL EVERY 8 HOURS
Status: DISPENSED | OUTPATIENT
Start: 2021-09-11 | End: 2021-09-14

## 2021-09-11 RX ORDER — DEXAMETHASONE 4 MG/1
4 TABLET ORAL EVERY 8 HOURS SCHEDULED
Status: DISCONTINUED | OUTPATIENT
Start: 2021-09-11 | End: 2021-09-11

## 2021-09-11 RX ORDER — LEVETIRACETAM 750 MG/1
750 TABLET ORAL 2 TIMES DAILY
Status: DISCONTINUED | OUTPATIENT
Start: 2021-09-11 | End: 2021-09-11

## 2021-09-11 RX ORDER — HYDRALAZINE HYDROCHLORIDE 20 MG/ML
10-20 INJECTION INTRAMUSCULAR; INTRAVENOUS EVERY 30 MIN PRN
Status: DISCONTINUED | OUTPATIENT
Start: 2021-09-11 | End: 2021-09-20 | Stop reason: HOSPADM

## 2021-09-11 RX ADMIN — SODIUM PHOSPHATE, MONOBASIC, MONOHYDRATE AND SODIUM PHOSPHATE, DIBASIC, ANHYDROUS 15 MMOL: 276; 142 INJECTION, SOLUTION INTRAVENOUS at 06:28

## 2021-09-11 RX ADMIN — DEXAMETHASONE SODIUM PHOSPHATE 4 MG: 4 INJECTION, SOLUTION INTRA-ARTICULAR; INTRALESIONAL; INTRAMUSCULAR; INTRAVENOUS; SOFT TISSUE at 14:06

## 2021-09-11 RX ADMIN — ONDANSETRON 4 MG: 2 INJECTION INTRAMUSCULAR; INTRAVENOUS at 20:28

## 2021-09-11 RX ADMIN — GADOBUTROL 9.7 ML: 604.72 INJECTION INTRAVENOUS at 14:39

## 2021-09-11 RX ADMIN — SODIUM CHLORIDE: 9 INJECTION, SOLUTION INTRAVENOUS at 03:03

## 2021-09-11 RX ADMIN — HYDROMORPHONE HYDROCHLORIDE 0.2 MG: 0.2 INJECTION, SOLUTION INTRAMUSCULAR; INTRAVENOUS; SUBCUTANEOUS at 04:25

## 2021-09-11 RX ADMIN — ONDANSETRON 4 MG: 2 INJECTION INTRAMUSCULAR; INTRAVENOUS at 23:23

## 2021-09-11 RX ADMIN — MAGNESIUM SULFATE HEPTAHYDRATE 2 G: 40 INJECTION, SOLUTION INTRAVENOUS at 04:30

## 2021-09-11 RX ADMIN — DEXAMETHASONE SODIUM PHOSPHATE 4 MG: 4 INJECTION, SOLUTION INTRA-ARTICULAR; INTRALESIONAL; INTRAMUSCULAR; INTRAVENOUS; SOFT TISSUE at 05:17

## 2021-09-11 RX ADMIN — SODIUM PHOSPHATE, MONOBASIC, MONOHYDRATE AND SODIUM PHOSPHATE, DIBASIC, ANHYDROUS 15 MMOL: 276; 142 INJECTION, SOLUTION INTRAVENOUS at 23:23

## 2021-09-11 RX ADMIN — SODIUM PHOSPHATE, MONOBASIC, MONOHYDRATE AND SODIUM PHOSPHATE, DIBASIC, ANHYDROUS 15 MMOL: 276; 142 INJECTION, SOLUTION INTRAVENOUS at 20:28

## 2021-09-11 RX ADMIN — LEVETIRACETAM 750 MG: 100 INJECTION, SOLUTION INTRAVENOUS at 20:28

## 2021-09-11 RX ADMIN — LEVETIRACETAM 750 MG: 100 INJECTION, SOLUTION INTRAVENOUS at 08:57

## 2021-09-11 RX ADMIN — POTASSIUM CHLORIDE 20 MEQ: 29.8 INJECTION, SOLUTION INTRAVENOUS at 05:26

## 2021-09-11 RX ADMIN — HYDROMORPHONE HYDROCHLORIDE 0.2 MG: 0.2 INJECTION, SOLUTION INTRAMUSCULAR; INTRAVENOUS; SUBCUTANEOUS at 23:47

## 2021-09-11 RX ADMIN — DEXAMETHASONE SODIUM PHOSPHATE 4 MG: 4 INJECTION, SOLUTION INTRA-ARTICULAR; INTRALESIONAL; INTRAMUSCULAR; INTRAVENOUS; SOFT TISSUE at 23:23

## 2021-09-11 RX ADMIN — SODIUM PHOSPHATE, MONOBASIC, MONOHYDRATE AND SODIUM PHOSPHATE, DIBASIC, ANHYDROUS 15 MMOL: 276; 142 INJECTION, SOLUTION INTRAVENOUS at 04:43

## 2021-09-11 RX ADMIN — HYDROMORPHONE HYDROCHLORIDE 0.2 MG: 0.2 INJECTION, SOLUTION INTRAMUSCULAR; INTRAVENOUS; SUBCUTANEOUS at 07:05

## 2021-09-11 ASSESSMENT — ACTIVITIES OF DAILY LIVING (ADL)
ADLS_ACUITY_SCORE: 20
ADLS_ACUITY_SCORE: 18
ADLS_ACUITY_SCORE: 20
ADLS_ACUITY_SCORE: 18
ADLS_ACUITY_SCORE: 20

## 2021-09-11 ASSESSMENT — MIFFLIN-ST. JEOR: SCORE: 1749.63

## 2021-09-11 NOTE — PROGRESS NOTES
[facility], [place]   09/11/2021  Neurosurgery Progress Note:    Assessment:  Mita Higgins is a 30 year old female who is now s/p Stealth Assisted Redo supracerebellar infratentorial approach for tumor with endoscopic assistance; lumbar drain placement attempted but aborted on 9/10.    Plan:  - Serial neuro exams  - Pain control  - HOB > 30 degrees  - Decadron  - SBP <140  - MAPs >70  - Advance diet as tolerated  - Bowel regimen  - PRN antiemetics  - IVF until taking adequate PO  - PT/OT  - SCDs for DVT proph  - MRI today    -----------------------------------  Marine Leblanc MD  Neurosurgery Resident, PGY-3    Please contact neurosurgery resident on call with questions.    Dial * * [phone], enter [phone] when prompted.   -----------------------------------    Interval History: OR yesterday    Objective:   Temp:  [97.4  F (36.3  C)-98.1  F (36.7  C)] 97.4  F (36.3  C)  Pulse:  [76-92] 89  Resp:  [10-27] 12  BP: (125-148)/(82-96) 142/96  MAP:  [82 mmHg-95 mmHg] 95 mmHg  Arterial Line BP: (109-117)/(65-91) 109/91  FiO2 (%):  [40 %] 40 %  SpO2:  [100 %] 100 %  I/O last 3 completed shifts:  In: 4500 [I.V.:4500]  Out: 1785 [Urine:1535; Blood:250]    Gen: Appears comfortable, NAD  Wound: clean, dry, intact  Neurologic:  - Alert & Oriented to person, place  - Follows commands  - No gaze preference. No apparent hemineglect.  - PERRL,   - Face symmetric     Del Tr Bi WE WF Gr   R 5 5 5 5 5 5   L 5 5 5 5 5 5    HF KE KF DF PF EHL   R 5 5 5 5 5 5   L 5 5 5 5 5 5     Sensation intact and symmetric to light touch throughout    LABS:  Recent Labs   Lab 09/11/21  0220 09/10/21  2126 09/10/21  1939 09/10/21  1826 09/10/21  0725   NA  --  139 138 139 140   POTASSIUM  --  3.4* 3.7 3.9 4.0   CHLORIDE  --   --   --   --  106   CO2  --   --   --   --  24   ANIONGAP  --   --   --   --  10   * 176* 177* 168* 104*   BUN  --   --   --   --  9   CR  --   --   --   --  0.83   ALEXA  --   --   --   --  9.2        Recent Labs   Lab 09/10/21  2126 09/10/21  0725   WBC  --  6.1   RBC  --  4.75   HGB 12.2 14.0   HCT  --  40.5   MCV  --  85   MCH  --  29.5   MCHC  --  34.6   RDW  --  12.1   PLT  --  253       IMAGING:  Recent Results (from the past 24 hour(s))   CT Head w/o Contrast    Narrative    CTA  HEAD NECK WITH CONTRAST, CT HEAD W/O CONTRAST 9/10/2021 8:12 AM    CT angiogram of the neck   CT angiogram of the base of the brain with contrast  Reconstruction on the 3D workstation    Provided History:  Meningioma (H); Compression of brain (H)  ICD-10: Meningioma (H); Compression of brain (H)    Comparison:  Brain MRI 9/9/2021, 4/20/2021.      Technique:  HEAD and NECK CTA: During rapid bolus intravenous injection of  nonionic contrast material, axial images were obtained using thin  collimation multidetector helical technique from the base of the upper  aortic arch through the [place] of Garza. This CT angiogram data was  reconstructed at thin intervals with mild overlap. Images were sent to  the 3D workstation, and 3D reconstructions were obtained. The axial  source images, multiplanar reformations, 3D reconstructions in both  maximum intensity projection display and volume rendered models were  reviewed, with reconstructions performed by the technologist.    Technique: Using multidetector thin collimation helical acquisition  technique, axial, coronal and sagittal CT images from the skull base  to the vertex were obtained without intravenous contrast.   (topogram) image(s) also obtained and reviewed.    Contrast: iopamidol (ISOVUE-370) solution 75 mL    Findings:    Findings: Left frontal approach ventriculoperitoneal catheter tip  terminates near the foramen of Yepez. Stable multifocal extra-axial  lesions, including but not limited to an enhancing and partially  peripherally calcified mass adjacent to the basilar cisterns with mass  effect on the midbrain. This appears to abut the basilar artery.  Additional  lesions noted over the left frontotemporal and right  frontal area which also appear partially calcified. No acute  intracranial hemorrhage.. Gray/white matter differentiation in both  cerebral hemispheres is preserved. Ventricles are proportionate to the  cerebral sulci.    Prior occipital and suboccipital craniotomy and right temporal  craniotomy. visualized portions of the paranasal sinuses and mastoid  air cells are clear.    Head CTA demonstrates no aneurysm or stenosis of the major  intracranial arteries. There is abutment of both internal carotid  artery terminus and P1 segments of both PCAs. Abutment of the left  superior cerebellar artery.     Neck CTA demonstrates no stenosis of the major cervical arteries. The  origins of the great vessels from the aortic arch are patent. The  normal distal right internal carotid artery measures 5 mm. The normal  distal left internal carotid artery measures 5 mm. The anterior and  posterior communicating arteries are patent.    13 mm right thyroid nodule. Additional smaller nodule more superiorly  in the right thyroid lobe.      Impression    Impression:    1. Stable multifocal meningiomas, better evaluated on MRI 9/9/2021.   Paracavernous/petroclival meningioma abuts the ICA terminus and P1  segment of PCA on both sides. There is also abutment of left superior  cerebellar artery and encasement of the basilar artery. No associated  stenosis.  2. Head CTA demonstrates no aneurysm of the major intracranial  arteries.   3. Neck CTA demonstrates no stenosis of the major cervical arteries.  4.13 mm right thyroid nodule. Consider further evaluation with  ultrasound if not previously evaluated.    I have personally reviewed the examination and initial interpretation  and I agree with the findings.    CIARA NEGRO MD         SYSTEM ID:  [id number]   CTA Head Neck with Contrast    Narrative    CTA  HEAD NECK WITH CONTRAST, CT HEAD W/O CONTRAST 9/10/2021 8:12 AM    CT angiogram  of the neck   CT angiogram of the base of the brain with contrast  Reconstruction on the 3D workstation    Provided History:  Meningioma (H); Compression of brain (H)  ICD-10: Meningioma (H); Compression of brain (H)    Comparison:  Brain MRI 9/9/2021, 4/20/2021.      Technique:  HEAD and NECK CTA: During rapid bolus intravenous injection of  nonionic contrast material, axial images were obtained using thin  collimation multidetector helical technique from the base of the upper  aortic arch through the [place] of Garza. This CT angiogram data was  reconstructed at thin intervals with mild overlap. Images were sent to  the 3D workstation, and 3D reconstructions were obtained. The axial  source images, multiplanar reformations, 3D reconstructions in both  maximum intensity projection display and volume rendered models were  reviewed, with reconstructions performed by the technologist.    Technique: Using multidetector thin collimation helical acquisition  technique, axial, coronal and sagittal CT images from the skull base  to the vertex were obtained without intravenous contrast.   (topogram) image(s) also obtained and reviewed.    Contrast: iopamidol (ISOVUE-370) solution 75 mL    Findings:    Findings: Left frontal approach ventriculoperitoneal catheter tip  terminates near the foramen of Yepez. Stable multifocal extra-axial  lesions, including but not limited to an enhancing and partially  peripherally calcified mass adjacent to the basilar cisterns with mass  effect on the midbrain. This appears to abut the basilar artery.  Additional lesions noted over the left frontotemporal and right  frontal area which also appear partially calcified. No acute  intracranial hemorrhage.. Gray/white matter differentiation in both  cerebral hemispheres is preserved. Ventricles are proportionate to the  cerebral sulci.    Prior occipital and suboccipital craniotomy and right temporal  craniotomy. visualized portions of the  paranasal sinuses and mastoid  air cells are clear.    Head CTA demonstrates no aneurysm or stenosis of the major  intracranial arteries. There is abutment of both internal carotid  artery terminus and P1 segments of both PCAs. Abutment of the left  superior cerebellar artery.     Neck CTA demonstrates no stenosis of the major cervical arteries. The  origins of the great vessels from the aortic arch are patent. The  normal distal right internal carotid artery measures 5 mm. The normal  distal left internal carotid artery measures 5 mm. The anterior and  posterior communicating arteries are patent.    13 mm right thyroid nodule. Additional smaller nodule more superiorly  in the right thyroid lobe.      Impression    Impression:    1. Stable multifocal meningiomas, better evaluated on MRI 9/9/2021.   Paracavernous/petroclival meningioma abuts the ICA terminus and P1  segment of PCA on both sides. There is also abutment of left superior  cerebellar artery and encasement of the basilar artery. No associated  stenosis.  2. Head CTA demonstrates no aneurysm of the major intracranial  arteries.   3. Neck CTA demonstrates no stenosis of the major cervical arteries.  4.13 mm right thyroid nodule. Consider further evaluation with  ultrasound if not previously evaluated.    I have personally reviewed the examination and initial interpretation  and I agree with the findings.    CIARA NEGRO MD         SYSTEM ID:  [id number]   XR Surgery ZARINA Fluoro L/T 5 Min w Stills    Narrative    This exam was marked as non-reportable because it will not be read by a   radiologist or a [facility] non-radiologist provider.         XR Chest Port 1 View    Impression    RESIDENT PRELIMINARY INTERPRETATION  IMPRESSION:   1. Right IJ CVC with tip near the superior cavoatrial junction.   2. Streaky bibasilar opacities, likely atelectasis.

## 2021-09-11 NOTE — PROGRESS NOTES
Pt remains intubated d/t not following commands, only withdraws to pain both UE/LE. NeuroSurgery resident at bedside. CPAP/PS 40%, SBP goal .

## 2021-09-11 NOTE — BRIEF OP NOTE
[facility]    Brief Operative Note    Pre-operative diagnosis: Meningioma (H) [D32.9]  Compression of brain (H) [G93.5]  Injury of trochlear nerve, unspecified laterality, subsequent encounter [S04.20XD]  Post-operative diagnosis Same as pre-operative diagnosis    Procedure: Procedure(s):  Stealth Assisted Redo supracerebellar infratentorial approach for tumor with endoscopic assistance  lumbar drain placement attempted but aborted  Surgeon: Surgeon(s) and Role:     * Kris Pinzon MD - Primary     * Babar Kaur MD - Resident - Assisting     * Brandon Lomas MD - Resident - Assisting     * Solo Mooney MD - Resident - Assisting  Anesthesia: General   Estimated blood loss: 250 mL  Drains: None  Specimens:   ID Type Source Tests Collected by Time Destination   1 : Right meningioma Tissue Brain SURGICAL PATHOLOGY EXAM Kris Pinzon MD 9/10/2021  7:05 PM    2 : Right meningioma CUSA contents Tissue Brain SURGICAL PATHOLOGY EXAM Kris Pinzon MD 9/10/2021  8:47 PM      Findings:   Decompression of right ambient cistern tumor.   Complications: None.  Implants:   Implant Name Type Inv. Item Serial No.  Lot No. LRB No. Used Action   IMP PLATE SYN STR DOG BONE LOW PROFILE 2H .502  IMP PLATE SYN STR DOG BONE LOW PROFILE 2H .502  SYNTHES[employer]STRATEC  N/A 2 Explanted   IMP BUR HOLE COVER 17MM LOW PROFILE .527  IMP BUR HOLE COVER 17MM LOW PROFILE .527  [employer]STRATEC  N/A 3 Explanted   IMP PLATE SYN BOX LOW PROFILE 04H .511  IMP PLATE SYN BOX LOW PROFILE 04H .511  [employer]STRATEC  N/A 1 Explanted   IMP PLATE SYN BOX LOW PROFILE [id number] 04H .521  IMP PLATE SYN BOX LOW PROFILE [id number] 04H .521    N/A 2 Explanted   IMP SCR SYN MATRIX LOW PRO 1.5X04MM SELF DRILL 04.503.104.01  IMP SCR SYN MATRIX LOW PRO 1.5X04MM SELF DRILL 04.503.104.01  SYNTHES-STRATEC  N/A 23 Explanted   GRAFT  "DUREPAIR DURA MATRIX 3X3\" 14302 - [id number] Bone/Tissue/Biologic GRAFT DUREPAIR DURA MATRIX 3X3\" 00832  [employer]-NEURO [id number] N/A 1 Implanted   IMP BUR HOLE COVER 24MM LOW PROFILE .528 - [id number] Metallic Hardware/[place] IMP BUR HOLE COVER 24MM LOW PROFILE .528  SYN[employer]ES-STRATEC 0 N/A 3 Implanted   IMP PLATE SYN BOX LOW PROFILE [id number] 04H .521 - [id number] Metallic Hardware/[place] IMP PLATE SYN BOX LOW PROFILE [id number] 04H .521  SYNTHES-STRATEC 0 N/A 1 Implanted   IMP PLATE SYN X LOW PROFILE 4H .510 - [id number] Metallic Hardware/[place] IMP PLATE SYN X LOW PROFILE 4H .510  SYN[employer]ES-STRATEC 0 N/A 1 Implanted   IMP SCR SYN MATRIX LOW PRO 1.5X04MM SELF DRILL 04.503.104.01 - [id number] Metallic Hardware/[place] IMP SCR SYN MATRIX LOW PRO 1.5X04MM SELF DRILL 04.503.104.01  SYNTHES-STRATEC 0 N/A 21 Implanted   IMP SCR SYN MATRIX LOW PRO 1.5X04MM SELF DRILL 04.503.104.01 - [id number] Metallic Hardware/[place] IMP SCR SYN MATRIX LOW PRO 1.5X04MM SELF DRILL 04.503.104.01  SYNTHES-STRATEC 0 N/A 6 Wasted           "

## 2021-09-11 NOTE — PROGRESS NOTES
Patient bedside extubated to 6L oxymask with SpO2 99%. BBS clear bilateral without Stridor. Patient tolerated well and will continue to monitor.    John Alvarez,RRT  9/11/2021

## 2021-09-11 NOTE — ANESTHESIA POSTPROCEDURE EVALUATION
Patient: Mita Higgins    Procedure(s):  Stealth Assisted Redo supracerebellar infratentorial approach for tumor with endoscopic assistance  lumbar drain placement attempted but aborted    Diagnosis:Meningioma (H) [D32.9]  Compression of brain (H) [G93.5]  Injury of trochlear nerve, unspecified laterality, subsequent encounter [S04.20XD]  Diagnosis Additional Information: No value filed.    Anesthesia Type:  General    Note:  Disposition: Inpatient; Admission; ICU            ICU Sign Out: Anesthesiologist/ICU physician sign out WAS performed   Postop Pain Control: Uneventful            Sign Out: Well controlled pain   PONV: No   Neuro/Psych: Uneventful            Sign Out: Acceptable/Baseline neuro status   Airway/Respiratory: Uneventful            Sign Out: AIRWAY IN SITU/Resp. Support               Airway in situ/Resp. Support: ETT                 Reason: Planned Pre-op   CV/Hemodynamics: Uneventful            Sign Out: Acceptable CV status; No obvious hypovolemia; No obvious fluid overload   Other NRE: NONE   DID A NON-ROUTINE EVENT OCCUR? No           Last vitals:  Vitals Value Taken Time   /94 09/11/21 0110   Temp 36.6  C (97.8  F) 09/10/21 2330   Pulse 88 09/11/21 0118   Resp 13 09/11/21 0118   SpO2 100 % 09/11/21 0118   Vitals shown include unvalidated device data.    Electronically Signed By: Trung Morse MD  September 11, 2021  1:19 AM

## 2021-09-11 NOTE — OP NOTE
[facility]  Department of Neurosurgery  Operative Report     Procedure Date:  9/10/2021     STAFF SURGEON:  Kris Pinzon MD     RESIDENT SURGEON:  Garry Mooney MD, Babar Kaur MD     PREOPERATIVE DIAGNOSIS:    1. Multiple meningiomas  2. 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 Obstructive hydrocephalus s/p  shunt placement (Strata 1.5)  4. Right trochlear nerve palsy  5. Brainstem compression    POSTOPERATIVE DIAGNOSIS:  Same     PROCEDURE PERFORMED:   1. Supracerebellar, infratentorial approach (redo) for recurrent, radiated petrotentorial meningioma  2. Stereotactic navigation  3. Use of operating microscope  4. Use of endoscopic assistance  5. Attempted lumbar drain placement  6. Neuromonitoring: SSEP, EEG  7. Shunt reprogramming x2    ANESTHESIA:  General endotracheal.      ESTIMATED BLOOD LOSS:  50 mL.      INDICATION FOR THE OPERATION:    Patient is a 30 year old left-handed female with history of multiple meningiomas including extensive bilateral petroclival meningiomas causing brainstem compression. She had undergone a previous operation for a growing right petrotentorial meningioma causing brainstem compression by my partners and then she underwent proton beam radiation in 2012. Over the past several years, her right petrotentorial meningioma was found to be growing so she was referred for operative management. Given its growth and her young age, we discussed the options for management including continued observation, re-irradiation, and surgical resection. After a full discussion of risks, benefits, and alternatives, She agreed to proceed with surgery. Informed consent was obtained.     DESCRIPTION OF PROCEDURE:    Patient was brought to the operating room where the anesthesia team induced general anesthesia and performed the intubation. Intravenous and intraarterial access was obtained. A central venous catheter was placed by the anesthesia  team. The patient was then positioned laterally for lumbar drain placement.  The patient was pulled parallel to the edge of the bed and the knees were tucked to widen the interlaminar space.   A gel roll was placed in the axilla.  Patient was secured safely to the bed. Intravenous antibiotics were administered. A timeout was performed to confirm details of the lumbar drain procedure. DuraPrep was used to prep the skin overlying the lumbar region.  Sterile towels were placed around the operative area.  A 14 gauge Tuohy needle was inserted into the L4-L5, then L3-4, then L2-3 spaces without reaching CSF. X-ray guidance was also used and showed the expected location was accessed correctly at each of these levels, but no CSF was seen. Given this, we decided to abort the lumbar drain.    The patient was then placed in sitting position. The bed was flexed at the hip and the knees. A [place] head clamp was fixed such that the prior  shunt and prior frontal and suboccipital craniotomy sites were avoided. The head was gently flexed and fixed to the bed using the U frame and clamp. The Stealth navigation system was registered and its accuracy confirmed. The prior midline suboccipital region was marked and a small strip of hair was shaved. The neuromonitoring team placed leads for SSEP, EEG, and MEP monitoring. Since the lumbar drain was not placed successfully, we decided to change her shunt setting to 0.5 to allow for more brain relaxation and help avoid any brain retraction from being necessary during the case. The area was prepped and draped in usual sterile fashion. A second timeout was performed to confirm details of the procedure. Lidocaine with 1:200,000 epinepherine was injected into the incision.    We then began. A 10 blade was used to make the initial incision in a portion of the prior incision, avoiding opening the inferior aspect of the prior incision.  The avascular midline raphe was developed. We exposed the  occipital bone and prior craniotomy site. The bone had appeared to heal back in nicely. The plates and screws were removed. A new torcular craniotomy was performed using new rene holes, which was kept above the foramen magnum. Patient remained hemodynamically stable with no signs of air embolism.    We then proceeded with resection. The dura was opened and tacked up. We used stereotaxy to align our planned trajectory to the deep perimesencephalic tumor. We brought in the operating microscope. We drained a small amount of CSF from the cisterna magna. We divided the dense scar to establish the prior supracerebellar, infratentorial corridor. We worked our way back to the apex of the culmen. To liberate the dense scar along the culmen to the tentorium, we needed to bring in the endoscope. We visualized the rise of the culmen and divided the attachments to the tentorium.  Removing the extensive scar allowed the cerebellum to fall nicely. We then dissected the scar and arachnoid in the paramedian supracerebellar corridors bilaterally. This allowed us to identify the basal veins of Shiva, vein of Deniz, and PCA branches.  We then again confirmed our deep trajectory using neuronavigation. We dissected the scar from the tumor capsule. The tumor was quite firm. We began to debulk the tumor. We used the ultrasonic aspirator to debulk the center. We were able to achieve sufficient debulking to mobilize the lateral capsule edge. We divided the right tentorium in order to achieve a more lateral view around the capsule. Once the lateral capsule was mobilized, we worked inferiorly and then medially. We identified the capsule adherent to the lateral midbrain, as noted in the previous operative report from 2012. We kept a small amount of tumor on the brainstem surface but debulked the remainder of the tumor. After this was completed, we finally worked superiorly. We established a plane between the superior capsule and scar within  the ambient cistern. We finally were able to mobilize the final piece of capsule in this area. Once we had removed all the tumor we could safely, with confirmed our borders using neuronavigation once again. This demonstrated that most of the tumor planned for removal today was removed, aside from scarred in tumor around the perimeter and midbrain. At this point, we were satisfied with our resection and worked towards closure.    We irrigated copiously and confirmed that hemostasis was excellent. The cerebellum was lined with surgicel. The dura was closed using a durarepair graft. The bone flap was replated. The muscle and fascia were closed in layers. The galea was closed with interrupted vicryl sutures and the skin was closed with a running nylon suture. The wound was sterilely dressed and the were taken down. The shunt valve (Strata) was reprogrammed back to 1.5.    The patient was then taken out of sitting position and the Collins head james. Patient was extubated and brought to the postoperative care unit.  I updated the mother by telephone upon conclusion of the procedure. Due to the extensive scar from prior surgery and radiation, and adherence to the brainstem, an extra 30% time and effort was required.    I was present during the key portions of the operation and I was immediately available for the entire procedure.      Kris Pinzon MD PhD

## 2021-09-11 NOTE — PHARMACY-ADMISSION MEDICATION HISTORY
Admission Medication History Completed by Pharmacy    See [facility] Admission Navigator for allergy information, preferred outpatient pharmacy, prior to admission medications and immunization status.     Medication History Sources:     Surescripts/CareEverywhere    Changes made to PTA medication list (reason):    Added: None    Deleted: None    Changed: None    Additional Information:    None    Prior to Admission medications    Medication Sig Last Dose Taking? Auth Provider   acetaminophen (TYLENOL) 325 MG tablet Take 2 tablets (650 mg) by mouth every 6 hours  Patient taking differently: Take 650 mg by mouth every 6 hours as needed  9/10/2021 at n Yes Susan Lal MD   atomoxetine (STRATTERA) 40 MG capsule Take 1 capsule by mouth every morning Hold DOS 12-19 9/9/2021 at 0800 Yes Reported, Patient   ibuprofen (ADVIL/MOTRIN) 800 MG tablet Take 800 mg by mouth daily as needed  Unknown at Unknown time Yes Reported, Patient   topiramate (TOPAMAX) 25 MG tablet TAKE ONE TABLET ORALLY AT EACH BEDTIME  Patient taking differently: At Bedtime TAKE ONE TABLET ORALLY AT EACH BEDTIME 9/9/2021 at 2200 Yes Mehnaz Weiner APRN CNP       Date completed: 09/11/21    Medication history completed by: Manuela Gregory,  KehindeD

## 2021-09-11 NOTE — CONSULTS
[facility]  Neurocritical Care Consultation     Patient Name:  Mita Higgins  MRN:  [id number]    :  1990  Date of Service:  2021  Primary care provider:  Mitchel Aldridge  Date of Admission: 9/10/2021    Chief Complaint:  Meningioma      History of Present Illness:  Mita Higgins is a 30 year old left-handed woman with history of multiple meningiomas s/p 3 resective procedures, obstructive hydrocephalus s/p  shunt, chronic daily headache, cholelithiasis, and ADHD who presents for scheduled stealth-assisted infratentorial approach. Surgery occurred on 9/10; patient had prolonged stay in PACU where she was reportedly slow to respond to commands/had minimal withdrawal in all 4 extremities. Over the course of the night, exam improved. Pt transferred to  where she remains intubated. Neuro-critical care is consulted for ICU cares.     Patient is intubated and thus unable to provide history.       ROS: Intubated, thus unable to assess.     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)      S/P  shunt        Past Surgical History:  Past Surgical History:   Procedure Laterality Date     ARTHROPLASTY HIP  2013    Procedure: ARTHROPLASTY HIP;  Left Total Hip Replacement ;  Surgeon: Oscar Peña MD;  Location: [facility] OR     ARTHROPLASTY HIP Right 2014    Procedure: ARTHROPLASTY HIP;  Surgeon: Oscar Peña MD;  Location: [facility] OR     BIOPSY  10/2010    brain tumor     ENT SURGERY      T & A     IMPLANT SHUNT VENTRICULOPERITONEAL  2012    Procedure:IMPLANT SHUNT VENTRICULOPERITONEAL; Left Ventriculo-Peritoneal Shunt Placement with Stealth Axiom; Surgeon:DWIGHT HOPKINS; Location:[facility] OR     OPTICAL TRACKING SYSTEM CRANIOTOMY,  EXCISE TUMOR, COMBINED  4/12/2012    Procedure:COMBINED OPTICAL TRACKING SYSTEM CRANIOTOMY, EXCISE TUMOR; Stealth Assisted Suboccipital Craniotomy and Supracerabellar Infratentorial Approach to Brain Tumor; Surgeon:KEN VAUGHN; Location:[facility] OR       Family History:  Family History   Problem Relation Age of Onset     Chronic Obstructive Pulmonary Disease Mother      Anesthesia Reaction No family hx of      Cardiovascular No family hx of      Deep Vein Thrombosis (DVT) No family hx of        Social History:  Social History     Tobacco Use     Smoking status: Never Smoker     Smokeless tobacco: Never Used   Substance Use Topics     Alcohol use: Yes     Comment: rare---- only on special occasions        Allergies:  Allergies   Allergen Reactions     Nkda [No Known Drug Allergies]      Seasonal Allergies        Medications:  Current Facility-Administered Medications   Medication     [START ON 9/14/2021] acetaminophen (TYLENOL) tablet 650 mg     acetaminophen (TYLENOL) tablet 975 mg     atomoxetine (STRATTERA) capsule 40 mg     bisacodyl (DULCOLAX) Suppository 10 mg     dexamethasone (DECADRON) tablet 4 mg    Followed by     [START ON 9/14/2021] dexamethasone (DECADRON) tablet 3 mg    Followed by     [START ON 9/17/2021] dexamethasone (DECADRON) tablet 2 mg    Followed by     [START ON 9/21/2021] dexamethasone (DECADRON) tablet 1 mg     hydrALAZINE (APRESOLINE) injection 10-20 mg     HYDROmorphone (DILAUDID) injection 0.2 mg    Or     HYDROmorphone (DILAUDID) injection 0.4 mg     labetalol (NORMODYNE/TRANDATE) injection 10-40 mg     levETIRAcetam (KEPPRA) tablet 750 mg     lidocaine (LMX4) cream     lidocaine 1 % 0.1-1 mL     magnesium hydroxide (MILK OF MAGNESIA) suspension 30 mL     magnesium sulfate 2 g in water intermittent infusion     naloxone (NARCAN) injection 0.2 mg    Or     naloxone (NARCAN) injection 0.4 mg     ondansetron (ZOFRAN-ODT) ODT tab 4 mg    Or     ondansetron (ZOFRAN) injection 4 mg      oxyCODONE (ROXICODONE) tablet 5 mg    Or     oxyCODONE IR (ROXICODONE) tablet 10 mg     polyethylene glycol (MIRALAX) Packet 17 g     potassium chloride 20 mEq in 50 mL intermittent infusion     prochlorperazine (COMPAZINE) injection 10 mg    Or     prochlorperazine (COMPAZINE) tablet 10 mg     senna-docusate (SENOKOT-S/PERICOLACE) 8.6-50 MG per tablet 1 tablet     sodium chloride (PF) 0.9% PF flush 3 mL     sodium chloride (PF) 0.9% PF flush 3 mL     sodium chloride 0.9% infusion     sodium phosphate (NaPHOS) 15 mMol in 250 mL D5W PREMADE infusion     topiramate (TOPAMAX) tablet 25 mg         Objective:  Vitals:  Patient Vitals for the past 8 hrs:   BP Temp Temp src Pulse Resp SpO2 Weight   09/11/21 0400 129/81 98.1  F (36.7  C) Bladder 64 13 100 % --   09/11/21 0345 -- 97.9  F (36.6  C) -- 75 13 100 % --   09/11/21 0330 128/80 97.9  F (36.6  C) -- 71 12 100 % --   09/11/21 0315 -- 97.9  F (36.6  C) -- 77 13 100 % --   09/11/21 0300 (!) 123/94 97.9  F (36.6  C) -- 80 13 100 % --   09/11/21 0245 -- 97.9  F (36.6  C) -- 75 12 100 % --   09/11/21 0230 133/76 97.9  F (36.6  C) Bladder 87 14 100 % 99.7 kg (219 lb 12.8 oz)   09/11/21 0130 (!) 142/96 -- -- 89 12 -- --   09/11/21 0120 (!) 143/95 -- -- 87 13 -- --   09/11/21 0110 (!) 148/94 97.4  F (36.3  C) Core 90 13 -- --   09/11/21 0100 (!) 142/92 -- -- 92 27 -- --   09/11/21 0020 (!) 144/91 -- -- 76 12 -- --   09/11/21 0010 (!) 137/91 -- -- 79 10 -- --   09/11/21 0000 137/89 -- -- 77 11 -- --   09/10/21 2350 (!) 140/92 -- -- 92 12 -- --   09/10/21 2340 133/87 -- -- 87 11 -- --   09/10/21 2330 138/84 97.8  F (36.6  C) -- 83 11 -- --   09/10/21 2320 (!) 137/91 -- -- 85 12 -- --   09/10/21 2310 134/82 -- -- 85 12 -- --   09/10/21 2300 125/82 98  F (36.7  C) Oral 86 10 -- --         Infusions:     sodium chloride 75 mL/hr at 09/11/21 0400       Physical Exam:  Constitutional: Sleeping, arouses to sternal rub. Follows some simple commands.   Head: Normocephalic.    Cardiovascular: Appears warm, well-perfused, and non-toxic.  Respiratory: Mechanically intubated  Gastrointestinal: Mildly distended  Musculoskeletal: Normal tone x 4 extremities   Skin: No rashes or lesions appreciated on visualized skin.   Hematologic/Lymphatic/Immunologic: No bruising appreciated on visualized skin.     Neurologic Exam:   Midline conjugate gaze   Spontaneously opens eyes to command  Squeezes right hand to command, testing of left hand limited by medical equipment   Moves arms antigravity to noxious stimuli.   Plantarflexes/dorsiflexes to command   Face appears symmetric   Pupils 3mm, round, and reactive to light bilaterally     Labs/Imaging:  I have reviewed labs/imaging from this admission to date.     Assessment and Plan:  Mita Higgins is a 30 year old left-handed woman with history of multiple meningiomas s/p 3 resective procedures, obstructive hydrocephalus s/p  shunt, chronic daily headache, cholelithiasis, and ADHD who presents for scheduled stealth-assisted infratentorial approach. She is POD #1.     Neuro:  #POD1 redo supracerebellar infratentorial approach  #Hx Multiple meningiomas  #Post-operative pneumocephalus   - Sz prophylaxis: Keppra 750mg BID   - Decadron taper 4mg Q8hr daily x 2 days, followed by 3mg Q8hrs x 2 days, then 2mg q8 hours x 2 days, then 1mg s5cthlc x2 days then STOP   - MR Brain in AM   - SBP goal < 150    #Post-operative Pain  - Scheduled tylenol 975mg Q8hrs with PRN available for breakthrough pain   - Dilaudid, oxycodone PRN     #ADHD  - Resume PTA Atomoxetine     #Hx Chronic Daily Headache  - Resume PTA Topamax     #Hx  Shunt  - Defer management to nrsgy     Cardiovascular:  No acute concerns.  Goal SBP <140  - Obtain baseline EKG.     Respiratory:  #Invasive ventilation/endotracheal intubation for surgery   Currently doing well on PS trial  - Plan to extubate in AM     Gastrointestinal:  No acute concerns. Anticipate extubation, so no need for GI ppx, but  can consider adding this on concomitantly with steroid taper (defer to day team).   Diet: NPO while intubated.     Bowel ppx: Scheduled senna-docusate, MiraLax     Renal:  IVF: NS @ 75 ml/hr    #Hypokalemia, hypophosphatemia  - Electrolyte replacement protocols - RN managed    Endocrine:  No acute concerns.     Heme:  No acute concerns     ID:  T max Temp (24hrs), Av.9  F (36.6  C), Min:97.4  F (36.3  C), Max:98.1  F (36.7  C)  No acute concerns.     WBC   WBC   Date Value Ref Range Status   2014 4.5 4.0 - 11.0 10e9/L Final     WBC Count   Date Value Ref Range Status   2021 10.7 4.0 - 11.0 10e3/uL Final      - Pan-culture if spike fever     Derm/Rheum:  No acute concerns.     Checklist:  FEN: NPO  Ppx: SCD's  Lines: Mares, PIV, CVC   Code Status: FULL     Dispo:  ICU       To be staffed with Dr. Knight in AM.     Fabio Victoria MD  PGY-3 Neurology Resident

## 2021-09-11 NOTE — PLAN OF CARE
Neuro: Patient drowsy, snoring - team aware & at bedside. Opens eyes to voice, pupils equal and reactive. Moves all extremities. Patient more aware this evening, transferring with 3 to commode.   CV: HR 80-90s. SBP 120s.   Pulm: Oxymask on at start of shift. Room air. Coarse lung sounds.   Patient encouraged to cough up secretions.   GI: Patient NPO due to drowsiness. Speech consult.   : Urinary catheter discontinued. Urine specific gravity sent.   Surg: Posterior head incision, old drainage noted.     MRI completed.   Patient's mother, Adelaida at bedside.   Will continue to monitor for safety and comfort.    Christelle Díaz, RN

## 2021-09-11 NOTE — ANESTHESIA CARE TRANSFER NOTE
Patient: Mita Higgins    Procedure(s):  Stealth Assisted Redo supracerebellar infratentorial approach for tumor with endoscopic assistance  lumbar drain placement attempted but aborted    Diagnosis: Meningioma (H) [D32.9]  Compression of brain (H) [G93.5]  Injury of trochlear nerve, unspecified laterality, subsequent encounter [S04.20XD]  Diagnosis Additional Information: No value filed.    Anesthesia Type:   General     Note:    Oropharynx: spontaneously breathing  Level of Consciousness: unresponsive  Patient oxygen source: ambu.    Independent Airway: airway patency satisfactory and stable  Dentition: dentition unchanged  Vital Signs Stable: post-procedure vital signs reviewed and stable  Report to RN Given: handoff report given  Patient transferred to: PACU  Comments: Pt breathing spontaneously and not awakening.  Placed on ventilator in pacu cpap 5, fio2 40%  VSS  Handoff Report: Identifed the Patient, Identified the Reponsible Provider, Reviewed the pertinent medical history, Discussed the surgical course, Reviewed Intra-OP anesthesia mangement and issues during anesthesia, Set expectations for post-procedure period and Allowed opportunity for questions and acknowledgement of understanding      Vitals:  Vitals Value Taken Time   /82 09/10/21 2254   Temp     Pulse 86 09/10/21 2259   Resp     SpO2 100 % 09/10/21 2259   Vitals shown include unvalidated device data.    Electronically Signed By: LOUISE Capone CRNA  September 10, 2021  11:01 PM

## 2021-09-12 ENCOUNTER — APPOINTMENT (OUTPATIENT)
Dept: SPEECH THERAPY | Facility: CLINIC | Age: 31
DRG: 025 | End: 2021-09-12
Attending: STUDENT IN AN ORGANIZED HEALTH CARE EDUCATION/TRAINING PROGRAM
Payer: MEDICARE

## 2021-09-12 ENCOUNTER — APPOINTMENT (OUTPATIENT)
Dept: PHYSICAL THERAPY | Facility: CLINIC | Age: 31
DRG: 025 | End: 2021-09-12
Attending: STUDENT IN AN ORGANIZED HEALTH CARE EDUCATION/TRAINING PROGRAM
Payer: MEDICARE

## 2021-09-12 LAB
ANION GAP SERPL CALCULATED.3IONS-SCNC: 8 MMOL/L (ref 3–14)
BUN SERPL-MCNC: 7 MG/DL (ref 7–30)
CALCIUM SERPL-MCNC: 8.3 MG/DL (ref 8.5–10.1)
CHLORIDE BLD-SCNC: 112 MMOL/L (ref 94–109)
CO2 SERPL-SCNC: 23 MMOL/L (ref 20–32)
CREAT SERPL-MCNC: 0.73 MG/DL (ref 0.52–1.04)
ERYTHROCYTE [DISTWIDTH] IN BLOOD BY AUTOMATED COUNT: 12.6 % (ref 10–15)
GFR SERPL CREATININE-BSD FRML MDRD: >90 ML/MIN/1.73M2
GLUCOSE BLD-MCNC: 120 MG/DL (ref 70–99)
HCT VFR BLD AUTO: 31.6 % (ref 35–47)
HGB BLD-MCNC: 10.8 G/DL (ref 11.7–15.7)
HGB BLD-MCNC: 11 G/DL (ref 11.7–15.7)
MAGNESIUM SERPL-MCNC: 2.2 MG/DL (ref 1.6–2.3)
MCH RBC QN AUTO: 28.9 PG (ref 26.5–33)
MCHC RBC AUTO-ENTMCNC: 34.2 G/DL (ref 31.5–36.5)
MCV RBC AUTO: 85 FL (ref 78–100)
PHOSPHATE SERPL-MCNC: 2.1 MG/DL (ref 2.5–4.5)
PLATELET # BLD AUTO: 216 10E3/UL (ref 150–450)
POTASSIUM BLD-SCNC: 3.5 MMOL/L (ref 3.4–5.3)
RBC # BLD AUTO: 3.74 10E6/UL (ref 3.8–5.2)
SODIUM SERPL-SCNC: 143 MMOL/L (ref 133–144)
WBC # BLD AUTO: 12.5 10E3/UL (ref 4–11)

## 2021-09-12 PROCEDURE — 250N000011 HC RX IP 250 OP 636: Performed by: STUDENT IN AN ORGANIZED HEALTH CARE EDUCATION/TRAINING PROGRAM

## 2021-09-12 PROCEDURE — 83735 ASSAY OF MAGNESIUM: CPT | Performed by: NEUROLOGICAL SURGERY

## 2021-09-12 PROCEDURE — 97530 THERAPEUTIC ACTIVITIES: CPT | Mod: GP

## 2021-09-12 PROCEDURE — 85018 HEMOGLOBIN: CPT | Performed by: STUDENT IN AN ORGANIZED HEALTH CARE EDUCATION/TRAINING PROGRAM

## 2021-09-12 PROCEDURE — 92610 EVALUATE SWALLOWING FUNCTION: CPT | Mod: GN

## 2021-09-12 PROCEDURE — 97116 GAIT TRAINING THERAPY: CPT | Mod: GP

## 2021-09-12 PROCEDURE — 120N000002 HC R&B MED SURG/OB UMMC

## 2021-09-12 PROCEDURE — 250N000011 HC RX IP 250 OP 636: Performed by: NEUROLOGICAL SURGERY

## 2021-09-12 PROCEDURE — 250N000009 HC RX 250: Performed by: NEUROLOGICAL SURGERY

## 2021-09-12 PROCEDURE — 82565 ASSAY OF CREATININE: CPT | Performed by: STUDENT IN AN ORGANIZED HEALTH CARE EDUCATION/TRAINING PROGRAM

## 2021-09-12 PROCEDURE — 250N000013 HC RX MED GY IP 250 OP 250 PS 637: Performed by: STUDENT IN AN ORGANIZED HEALTH CARE EDUCATION/TRAINING PROGRAM

## 2021-09-12 PROCEDURE — 258N000003 HC RX IP 258 OP 636: Performed by: NEUROLOGICAL SURGERY

## 2021-09-12 PROCEDURE — 84100 ASSAY OF PHOSPHORUS: CPT | Performed by: STUDENT IN AN ORGANIZED HEALTH CARE EDUCATION/TRAINING PROGRAM

## 2021-09-12 PROCEDURE — 97161 PT EVAL LOW COMPLEX 20 MIN: CPT | Mod: GP

## 2021-09-12 PROCEDURE — 85027 COMPLETE CBC AUTOMATED: CPT | Performed by: STUDENT IN AN ORGANIZED HEALTH CARE EDUCATION/TRAINING PROGRAM

## 2021-09-12 RX ORDER — POTASSIUM CHLORIDE 29.8 MG/ML
20 INJECTION INTRAVENOUS ONCE
Status: COMPLETED | OUTPATIENT
Start: 2021-09-12 | End: 2021-09-12

## 2021-09-12 RX ORDER — PHENAZOPYRIDINE HYDROCHLORIDE 200 MG/1
200 TABLET, FILM COATED ORAL
Status: DISPENSED | OUTPATIENT
Start: 2021-09-12 | End: 2021-09-14

## 2021-09-12 RX ADMIN — ONDANSETRON 4 MG: 4 TABLET, ORALLY DISINTEGRATING ORAL at 17:54

## 2021-09-12 RX ADMIN — POTASSIUM CHLORIDE 20 MEQ: 29.8 INJECTION, SOLUTION INTRAVENOUS at 05:26

## 2021-09-12 RX ADMIN — ONDANSETRON 4 MG: 4 TABLET, ORALLY DISINTEGRATING ORAL at 05:26

## 2021-09-12 RX ADMIN — ONDANSETRON 4 MG: 4 TABLET, ORALLY DISINTEGRATING ORAL at 12:04

## 2021-09-12 RX ADMIN — DEXAMETHASONE SODIUM PHOSPHATE 4 MG: 4 INJECTION, SOLUTION INTRA-ARTICULAR; INTRALESIONAL; INTRAMUSCULAR; INTRAVENOUS; SOFT TISSUE at 05:26

## 2021-09-12 RX ADMIN — PHENAZOPYRIDINE 200 MG: 200 TABLET ORAL at 17:54

## 2021-09-12 RX ADMIN — ACETAMINOPHEN 975 MG: 325 TABLET, FILM COATED ORAL at 10:27

## 2021-09-12 RX ADMIN — PHENAZOPYRIDINE 200 MG: 200 TABLET ORAL at 08:51

## 2021-09-12 RX ADMIN — ACETAMINOPHEN 975 MG: 325 TABLET, FILM COATED ORAL at 18:00

## 2021-09-12 RX ADMIN — TOPIRAMATE 25 MG: 25 TABLET, FILM COATED ORAL at 22:15

## 2021-09-12 RX ADMIN — DEXAMETHASONE SODIUM PHOSPHATE 4 MG: 4 INJECTION, SOLUTION INTRA-ARTICULAR; INTRALESIONAL; INTRAMUSCULAR; INTRAVENOUS; SOFT TISSUE at 22:15

## 2021-09-12 RX ADMIN — DEXAMETHASONE SODIUM PHOSPHATE 4 MG: 4 INJECTION, SOLUTION INTRA-ARTICULAR; INTRALESIONAL; INTRAMUSCULAR; INTRAVENOUS; SOFT TISSUE at 16:38

## 2021-09-12 RX ADMIN — PHENAZOPYRIDINE 200 MG: 200 TABLET ORAL at 12:04

## 2021-09-12 RX ADMIN — POLYETHYLENE GLYCOL 3350 17 G: 17 POWDER, FOR SOLUTION ORAL at 12:05

## 2021-09-12 RX ADMIN — DOCUSATE SODIUM 50 MG AND SENNOSIDES 8.6 MG 1 TABLET: 8.6; 5 TABLET, FILM COATED ORAL at 09:05

## 2021-09-12 RX ADMIN — ATOMOXETINE 40 MG: 10 CAPSULE ORAL at 09:05

## 2021-09-12 RX ADMIN — DOCUSATE SODIUM 50 MG AND SENNOSIDES 8.6 MG 1 TABLET: 8.6; 5 TABLET, FILM COATED ORAL at 19:48

## 2021-09-12 RX ADMIN — OXYCODONE HYDROCHLORIDE 5 MG: 5 TABLET ORAL at 19:47

## 2021-09-12 RX ADMIN — SODIUM PHOSPHATE, MONOBASIC, MONOHYDRATE AND SODIUM PHOSPHATE, DIBASIC, ANHYDROUS 15 MMOL: 276; 142 INJECTION, SOLUTION INTRAVENOUS at 05:43

## 2021-09-12 ASSESSMENT — ACTIVITIES OF DAILY LIVING (ADL)
ADLS_ACUITY_SCORE: 14
ADLS_ACUITY_SCORE: 14
ADLS_ACUITY_SCORE: 18
ADLS_ACUITY_SCORE: 14

## 2021-09-12 ASSESSMENT — MIFFLIN-ST. JEOR: SCORE: 1743.63

## 2021-09-12 NOTE — PROGRESS NOTES
[facility], [place]   09/12/2021  Neurosurgery Progress Note:    Assessment:  Mita Higgins is a 30 year old female who is now s/p Stealth Assisted Redo supracerebellar infratentorial approach for tumor with endoscopic assistance; lumbar drain placement attempted but aborted on 9/10.    Plan:  - Serial neuro exams  - Pain control  - HOB > 30 degrees  - Decadron  - SBP <140  - MAPs >70  - Advance diet as tolerated  - Bowel regimen  - PRN antiemetics  - IVF until taking adequate PO  - PT/OT  - Started phenazopyridine   - SCDs for DVT proph    -----------------------------------  Tyrell Lino MD  Neurosurgery Resident    Please contact neurosurgery resident on call with questions.    Dial * * [phone], enter [phone] when prompted.   -----------------------------------    Interval History: doing well, overnight with    Objective:   Temp:  [97.9  F (36.6  C)-98.7  F (37.1  C)] 97.9  F (36.6  C)  Pulse:  [] 93  Resp:  [11-32] 20  BP: (103-131)/(63-91) 127/82  SpO2:  [93 %-100 %] 99 %  I/O last 3 completed shifts:  In: 1169.21 [I.V.:1169.21]  Out: 2615 [Urine:2615]    Gen: Appears comfortable, NAD  Wound: clean, dry, intact  Neurologic:  - Alert & Oriented to person, place  - Follows commands  - No gaze preference. No apparent hemineglect.  - PERRL,   - Face symmetric     Del Tr Bi WE WF Gr   R 5 5 5 5 5 5   L 5 5 5 5 5 5    HF KE KF DF PF EHL   R 5 5 5 5 5 5   L 5 5 5 5 5 5     Sensation intact and symmetric to light touch throughout    LABS:  Recent Labs   Lab 09/12/21  0406 09/11/21  1635 09/11/21  1206 09/11/21  0259 09/10/21  2126 09/10/21  1826 09/10/21  0725     --   --  140 139  --  140   POTASSIUM 3.5  --   --  3.6  3.6 3.4*  --  4.0   CHLORIDE 112*  --   --  109  --   --  106   CO2 23  --   --  22  --   --  24   ANIONGAP 8  --   --  9  --   --  10   * 121* 130* 130* 176*   < > 104*   BUN 7  --   --  5*  --   --  9   CR 0.73  --   --  0.70  --   --  0.83   ALEXA 8.3*   --   --  8.0*  --   --  9.2    < > = values in this interval not displayed.       Recent Labs   Lab 09/12/21  0406   WBC 12.5*   RBC 3.74*   HGB 10.8*   HCT 31.6*   MCV 85   MCH 28.9   MCHC 34.2   RDW 12.6          IMAGING:  Recent Results (from the past 24 hour(s))   MR Brain w/o & w Contrast    Narrative     MR BRAIN W/O & W CONTRAST 9/11/2021 3:39 PM    Provided History: Follow up tumor resection; please obtain with tumor  protocol (include thin cuts (1mm)).  ICD-10: Meningioma    Comparison: MRI brain 9/9/2021.    Technique: Multiplanar T1-weighted, axial FLAIR, and susceptibility  images were obtained without intravenous contrast. Following  intravenous gadolinium-based contrast administration, axial  T2-weighted, diffusion, and T1-weighted images (in multiple planes)  were obtained.    Contrast: 9.7CC GADAVIST     Findings:  Post surgical changes suboccipital cranioplasty and resection of  meningioma adjacent to the left midbrain with a small amount of  enhancement along the edges of the resection bed. Susceptibility  artifact in the resection bed indicating a small amount of hemorrhagic  products likely postsurgical. T1 and T2 hyperintense fluid collection  overlying the superior posterior cerebellum that is likely  postoperative. Left frontal approach VT shunt is in stable position  with tip terminating near the foramen of Monro. Similar appearance of  T2 isointense enhancing lesions filling the basal cisterns measuring  4.4 x 3.9 cm (series 103 image 86) which encases the basilar artery,  dural based enhancing lesion along the right convexity measuring 1.1 x  2.1 cm (series 103 image 141), dural-based lesion along the left  frontal convexity measuring 1.9 x 0.9 cm (series 3 image 113) and  dural based lesion along the anterior falx measuring 7 x 4 mm. (Series  103 image 106). There is no significant midline shift.. The ventricles  are proportionate to the cerebral sulci. Normal major  vascular  intracranial flow-voids.    Small focus of diffusion restriction within the posterior cerebellum  adjacent to the craniotomy which likely represents postsurgical  changes.    No abnormality of the skull marrow signal. The visualized portions of  paranasal sinuses, and mastoid air cells are relatively clear. The  orbits are grossly unremarkable.      Impression    Impression:     1. New post surgical changes of meningioma resection adjacent to the  right midbrain with decreased mass effect on the brainstem. Small  amount of residual enhancement within along the edges of the resection  cavity.  2. Unchanged appearance of meningiomas along the bilateral frontal  convexities, anterior falx, and filling the basal cisterns.  3. Small focus of diffusion restriction within the posterior left  cerebellum adjacent to the suboccipital craniotomy which may be  related to recent surgery.  4. Stable left frontal approach  shunt with tip near the foramen of  Yepez.    I have personally reviewed the examination and initial interpretation  and I agree with the findings.    JAYLYN FLORES MD         SYSTEM ID:  [id number]

## 2021-09-12 NOTE — PROGRESS NOTES
"   09/12/21 1000   Quick Adds   Type of Visit Initial PT Evaluation   Living Environment   People in home alone   Current Living Arrangements apartment   Home Accessibility no concerns   Transportation Anticipated family or friend will provide  (mother provides transportation)   Living Environment Comments Pt lives alone in apartment, mom lives 1-1.5 blocks away, per pt she can not stay with her mom but mom can maybe stay with her. Has access to shower chair and walker but does not typically use one, tub shower no grab bars   Self-Care   Usual Activity Tolerance moderate   Current Activity Tolerance fair   Regular Exercise No   Equipment Currently Used at Home none   Disability/Function   Hearing Difficulty or Deaf no   Wear Glasses or Blind yes   Vision Management glasses \"when I need them\"   Difficulty Communicating no   Difficulty Eating/Swallowing no   Walking or Climbing Stairs Difficulty no   Mobility Management Doesn't have stairs in apartment, typically walks without AD per pt   Dressing/Bathing Difficulty no   Toileting issues no   Doing Errands Independently Difficulty (such as shopping) yes   Errands Management Pt does some of her own chores/errands but mother also helps at times   Fall history within last six months no   Change in Functional Status Since Onset of Current Illness/Injury yes   General Information   Onset of Illness/Injury or Date of Surgery 09/10/21   Referring Physician Tyrell Lino MD   Patient/Family Therapy Goals Statement (PT) get back to bed   Pertinent History of Current Problem (include personal factors and/or comorbidities that impact the POC) 30 year old left-handed woman with history of multiple meningiomas s/p 3 resective procedures, obstructive hydrocephalus s/p  shunt, chronic daily headache, cholelithiasis, and ADHD who presents for scheduled stealth-assisted infratentorial approach. Surgery occurred on 9/10; patient had prolonged stay in PACU where she was reportedly slow " to respond to commands/had minimal withdrawal in all 4 extremities. Over the course of the night, exam improved. Pt transferred to  where she remains intubated. Neuro-critical care is consulted for ICU cares. Now extubated and alert   Existing Precautions/Restrictions other (see comments);fall  (Craniotomy)   Heart Disease Risk Factors Overweight   Cognition   Orientation Status (Cognition) person;place   Affect/Mental Status (Cognition) confused   Follows Commands (Cognition) 75-90% accuracy   Safety Deficit (Cognition) moderate deficit;at risk behavior observed;insight into deficits/self-awareness;safety precautions awareness   Cognitive Status Comments Not oriented to date, unable to IND express situation (stating she was her for PT to physical therapist)   Pain Assessment   Patient Currently in Pain Yes, see Vital Sign flowsheet   Posture    Posture Forward head position;Protracted shoulders;Kyphosis   Range of Motion (ROM)   ROM Comment BUE and BLE grossly WFL   Strength   Manual Muscle Testing Quick Adds Deficits observed during functional mobility   Bed Mobility   Comment (Bed Mobility) Sit > supine with CGA-min A   Transfers   Transfer Safety Comments Sit <> Stand with min A, FWW, unsteady   Gait/Stairs (Locomotion)   Comment (Gait/Stairs) CGA-min A with FWW, unsteady, poor posture   Balance   Balance Comments Unsteady min A with and without FWW, increased posterior lean in static standing   Clinical Impression   Criteria for Skilled Therapeutic Intervention yes, treatment indicated   PT Diagnosis (PT) Impaired balance   Influenced by the following impairments balance, strength, pain, confusion   Functional limitations due to impairments gait, stairs, transfers, bed mobility, functional endurance   Clinical Presentation Stable/Uncomplicated   Clinical Presentation Rationale S/p craniotomy, clinical reasoning   Clinical Decision Making (Complexity) low complexity   Therapy Frequency (PT) 5x/week   Predicted  Duration of Therapy Intervention (days/wks) 1 week   Planned Therapy Interventions (PT) balance training;bed mobility training;gait training;home exercise program;neuromuscular re-education;patient/family education;stair training;strengthening;stretching;transfer training;risk factor education;home program guidelines;progressive activity/exercise   Risk & Benefits of therapy have been explained evaluation/treatment results reviewed;patient   PT Discharge Planning    PT Discharge Recommendation (DC Rec) Transitional Care Facility   PT Rationale for DC Rec Pt with impaired balance and at increased falls risk, pending level of assist and improved balance may be able to go home with 24/7 assist   PT Brief overview of current status  min A with hands on gait belt for safety   Total Evaluation Time   Total Evaluation Time (Minutes) 10

## 2021-09-12 NOTE — PROVIDER NOTIFICATION
Paged Dr. Lino w/ Neurosurg @ [phone] w/ pt has not slept all night. Uncooperative w/ nursing staff, yelling. Her sentences are starting to not make sense. Neuros still intact otherwise.

## 2021-09-12 NOTE — PROGRESS NOTES
Arrived from:  4A  Belongings/meds:  Cellphone, , 2 stuffed animals, blanket, slippers, phone case, bag with belongings  2 RN Skin Assessment Completed by:  Valeria CONNER RN, Lillian GLASGOW RN  Non-intact findings documented (yes/no/NA): Yes, incision to posterior head, SARAHI. Bruising to bilateral forearms, blanchable redness to chest where tele patches were, Old lumbar drain site SARAHI with bruise, poke site to R heel

## 2021-09-12 NOTE — PLAN OF CARE
Major Shift Events:  Assumed cares [id number]  Neuro: Intact, makes needs known and AxOx4. Developmentally delayed at baseline. Moves all extremities but has generalized weakness. Assist x1 to commode/chair. R eye kept slightly closed at all times-baseline. Head incision WDL closed with staples. Tylenol given for HA.   Cards: Sinus tach. BP WDL. Afebrile  Pulm: RA, dry cough  GI/: Regular diet. No BM. Urinary frequency sometimes with minimal output.   Plan: Continue to monitor.   For vital signs and complete assessments, please see documentation flowsheets.

## 2021-09-12 NOTE — PROGRESS NOTES
09/12/21 0940   General Information   Onset of Illness/Injury or Date of Surgery 09/10/21   Referring Physician Tyrell Lino MD   Behavioral Issues   (lethargic)   Patient/Family Therapy Goal Statement (SLP) Pt wants to get back to bed   Pertinent History of Current Problem Pt is a 30 year old left-handed woman with history of multiple meningiomas s/p 3 resective procedures, obstructive hydrocephalus s/p  shunt, chronic daily headache, cholelithiasis, and ADHD who presents for scheduled stealth-assisted infratentorial approach. Surgery occurred on 9/10; patient had prolonged stay in PACU where she was reportedly slow to respond to commands/had minimal withdrawal in all 4 extremities. Over the course of the night, exam improved. Pt was extubated yesterday but failed RN swallow screen. SLP attempted eval yesterday but pt was too lethargic, swallow eval completed this AM per MD order.    General Observations Lethargic, required encouragement to participate   Past History of Dysphagia Pt evaluated by this service in 2011 and again in 2012; functional swallow noted, regular diet/thin liquids recommended.    Type of Evaluation   Type of Evaluation Swallow Evaluation   Oral Motor   Oral Musculature generally intact   Structural Abnormalities none present   Mucosal Quality sticky   Dentition (Oral Motor)   Dentition (Oral Motor) natural dentition   Facial Symmetry (Oral Motor)   Facial Symmetry (Oral Motor) WNL;unable/difficult to assess  (d/t pt participation)   Tongue Function (Oral Motor)   Tongue ROM (Oral Motor) WNL   Cough/Swallow/Gag Reflex (Oral Motor)   Comment, Cough/Swallow/Gag Reflex (Oral Motor) adequate   Vocal Quality/Secretion Management (Oral Motor)   Vocal Quality (Oral Motor) WNL   General Swallowing Observations   Current Diet/Method of Nutritional Intake (General Swallowing Observations, NIS) regular diet;thin liquids (level 0)   Respiratory Support (General Swallowing Observations) none    Swallowing Evaluation Clinical swallow evaluation   Clinical Swallow Evaluation   Feeding Assistance no assistance needed   Clinical Swallow Evaluation Textures Trialed thin liquids;solid foods   Clinical Swallow Eval: Thin Liquid Texture Trial   Mode of Presentation, Thin Liquids straw;self-fed   Volume of Liquid or Food Presented 4oz thin milk   Oral Phase of Swallow Residue in oral cavity   Pharyngeal Phase of Swallow intact   Diagnostic Statement Oral phase functional, min amount of oral residue appreciated after the swallow 2/2 lethargy. No overt s/sx of aspiration   Clinical Swallow Evaluation: Solid Food Texture Trial   Mode of Presentation self-fed   Volume Presented 1 cracker   Oral Phase   (prolonged but functional)   Pharyngeal Phase intact   Diagnostic Statement Oral phase prolonged d/t lethargy but functional. No s/sx of aspiration   Esophageal Phase of Swallow   Patient reports or presents with symptoms of esophageal dysphagia No   Swallowing Recommendations   Diet Consistency Recommendations regular diet;thin liquids (level 0)   Supervision Level for Intake close supervision needed   Mode of Delivery Recommendations bolus size, small;slow rate of intake   Monitoring/Assistance Required (Eating/Swallowing) stop eating activities when fatigue is present   Recommended Feeding/Eating Techniques (Swallow Eval) maintain upright sitting position for eating   Medication Administration Recommendations, Swallowing (SLP) as tolerated   Instrumental Assessment Recommendations instrumental evaluation not recommended at this time   SLP Therapy Assessment/Plan   Criteria for Skilled Therapeutic Interventions Met (SLP Eval) current level of function same as previous level of function   SLP Diagnosis Functional swallow, currently impacted by lethargy   Therapy Frequency (SLP Eval) evaluation only   Comment, Therapy Assessment/Plan (SLP)   Clinical swallow eval completed per MD order. Pt presents with functional  oropharyngeal swallow mechanism which is currently impacted by lethargy. Oral mech exam unremarkable. Assessed with thin liquids via straw and cracker. Oral phase prolonged when chewing cracker d/t lethargy, but functional. Min amount of oral residue appreciated with milk, again d/t lethargy. Pharyngeal phase WFL, no overt s/sx of aspiration. Recommend pt continue regular diet/thin liquids with close supervision to ensure she is fully alert and upright for all PO. No additional skilled SLP services indicated.     Therapy Plan Review/Discharge Plan (SLP)   Therapy Plan Review (SLP) evaluation/treatment results reviewed;care plan/treatment goals reviewed;risks/benefits reviewed;current/potential barriers reviewed;participants included;patient   Demonstrates Need for Referral to Another Service (SLP) clinical nutrition services/dietitian   SLP Discharge Planning    SLP Discharge Recommendation (DC Rec)   (defer to MD)   SLP Rationale for DC Rec Functional swallow   SLP Brief overview of current status  Recommend pt continue regular diet/thin liquids with close supervision d/t lethargy. Ensure pt is alert and upright for all PO. No additional skilled SLP services indicated.     Total Evaluation Time   Total Evaluation Time (Minutes) 13

## 2021-09-12 NOTE — PLAN OF CARE
Major Shift Events: Pt did not sleep at all overnight d/t anxiety about not being able to get up to bedside commode (for safety reasons) and constant urge to urinate. Bladder scanned for 8mL post-void. Having to pee every 10-15 min w/ very little output. Was able to get up to bedside commode w/ ceiling lift, voided 65 mL. Q1hr neuros stopped at 0200, neuros intact. K and Phos replaced this AM.  Plan: Continue Q2hr neuros until specified. Neurosurgery MD to put in code status. K and Phos recheck tomorrow AM.   For vital signs and complete assessments, please see documentation flowsheets.

## 2021-09-13 ENCOUNTER — APPOINTMENT (OUTPATIENT)
Dept: OCCUPATIONAL THERAPY | Facility: CLINIC | Age: 31
DRG: 025 | End: 2021-09-13
Attending: STUDENT IN AN ORGANIZED HEALTH CARE EDUCATION/TRAINING PROGRAM
Payer: MEDICARE

## 2021-09-13 ENCOUNTER — APPOINTMENT (OUTPATIENT)
Dept: PHYSICAL THERAPY | Facility: CLINIC | Age: 31
DRG: 025 | End: 2021-09-13
Attending: NEUROLOGICAL SURGERY
Payer: MEDICARE

## 2021-09-13 LAB
ANION GAP SERPL CALCULATED.3IONS-SCNC: 3 MMOL/L (ref 3–14)
BUN SERPL-MCNC: 9 MG/DL (ref 7–30)
CALCIUM SERPL-MCNC: 8.9 MG/DL (ref 8.5–10.1)
CHLORIDE BLD-SCNC: 112 MMOL/L (ref 94–109)
CO2 SERPL-SCNC: 27 MMOL/L (ref 20–32)
CREAT SERPL-MCNC: 0.74 MG/DL (ref 0.52–1.04)
ERYTHROCYTE [DISTWIDTH] IN BLOOD BY AUTOMATED COUNT: 12.6 % (ref 10–15)
GFR SERPL CREATININE-BSD FRML MDRD: >90 ML/MIN/1.73M2
GLUCOSE BLD-MCNC: 110 MG/DL (ref 70–99)
HCT VFR BLD AUTO: 32.5 % (ref 35–47)
HGB BLD-MCNC: 11.2 G/DL (ref 11.7–15.7)
MAGNESIUM SERPL-MCNC: 2.2 MG/DL (ref 1.6–2.3)
MCH RBC QN AUTO: 29.9 PG (ref 26.5–33)
MCHC RBC AUTO-ENTMCNC: 34.5 G/DL (ref 31.5–36.5)
MCV RBC AUTO: 87 FL (ref 78–100)
PHOSPHATE SERPL-MCNC: 2.7 MG/DL (ref 2.5–4.5)
PLATELET # BLD AUTO: 224 10E3/UL (ref 150–450)
POTASSIUM BLD-SCNC: 3.6 MMOL/L (ref 3.4–5.3)
RBC # BLD AUTO: 3.75 10E6/UL (ref 3.8–5.2)
SODIUM SERPL-SCNC: 142 MMOL/L (ref 133–144)
WBC # BLD AUTO: 12.1 10E3/UL (ref 4–11)

## 2021-09-13 PROCEDURE — 250N000013 HC RX MED GY IP 250 OP 250 PS 637: Performed by: NURSE PRACTITIONER

## 2021-09-13 PROCEDURE — 250N000011 HC RX IP 250 OP 636: Performed by: STUDENT IN AN ORGANIZED HEALTH CARE EDUCATION/TRAINING PROGRAM

## 2021-09-13 PROCEDURE — 84100 ASSAY OF PHOSPHORUS: CPT | Performed by: STUDENT IN AN ORGANIZED HEALTH CARE EDUCATION/TRAINING PROGRAM

## 2021-09-13 PROCEDURE — 83735 ASSAY OF MAGNESIUM: CPT | Performed by: STUDENT IN AN ORGANIZED HEALTH CARE EDUCATION/TRAINING PROGRAM

## 2021-09-13 PROCEDURE — 85027 COMPLETE CBC AUTOMATED: CPT | Performed by: STUDENT IN AN ORGANIZED HEALTH CARE EDUCATION/TRAINING PROGRAM

## 2021-09-13 PROCEDURE — 97530 THERAPEUTIC ACTIVITIES: CPT | Mod: GP

## 2021-09-13 PROCEDURE — 80048 BASIC METABOLIC PNL TOTAL CA: CPT | Performed by: STUDENT IN AN ORGANIZED HEALTH CARE EDUCATION/TRAINING PROGRAM

## 2021-09-13 PROCEDURE — 250N000013 HC RX MED GY IP 250 OP 250 PS 637: Performed by: STUDENT IN AN ORGANIZED HEALTH CARE EDUCATION/TRAINING PROGRAM

## 2021-09-13 PROCEDURE — 97165 OT EVAL LOW COMPLEX 30 MIN: CPT | Mod: GO

## 2021-09-13 PROCEDURE — 250N000013 HC RX MED GY IP 250 OP 250 PS 637: Performed by: NEUROLOGICAL SURGERY

## 2021-09-13 PROCEDURE — 97535 SELF CARE MNGMENT TRAINING: CPT | Mod: GO

## 2021-09-13 PROCEDURE — 250N000012 HC RX MED GY IP 250 OP 636 PS 637: Performed by: STUDENT IN AN ORGANIZED HEALTH CARE EDUCATION/TRAINING PROGRAM

## 2021-09-13 PROCEDURE — 97116 GAIT TRAINING THERAPY: CPT | Mod: GP

## 2021-09-13 PROCEDURE — 120N000002 HC R&B MED SURG/OB UMMC

## 2021-09-13 PROCEDURE — 36592 COLLECT BLOOD FROM PICC: CPT | Performed by: STUDENT IN AN ORGANIZED HEALTH CARE EDUCATION/TRAINING PROGRAM

## 2021-09-13 RX ORDER — POLYETHYLENE GLYCOL 3350 17 G/17G
17 POWDER, FOR SOLUTION ORAL DAILY
COMMUNITY
Start: 2021-09-14 | End: 2021-09-24

## 2021-09-13 RX ORDER — ATOMOXETINE 40 MG/1
40 CAPSULE ORAL EVERY MORNING
COMMUNITY
Start: 2021-09-13

## 2021-09-13 RX ORDER — ONDANSETRON 4 MG/1
4 TABLET, ORALLY DISINTEGRATING ORAL EVERY 6 HOURS PRN
Status: DISCONTINUED | OUTPATIENT
Start: 2021-09-13 | End: 2021-09-20 | Stop reason: HOSPADM

## 2021-09-13 RX ORDER — AMOXICILLIN 250 MG
1 CAPSULE ORAL 2 TIMES DAILY
COMMUNITY
Start: 2021-09-13

## 2021-09-13 RX ORDER — ONDANSETRON 2 MG/ML
4 INJECTION INTRAMUSCULAR; INTRAVENOUS EVERY 6 HOURS PRN
Status: DISCONTINUED | OUTPATIENT
Start: 2021-09-13 | End: 2021-09-20 | Stop reason: HOSPADM

## 2021-09-13 RX ORDER — POTASSIUM CHLORIDE 750 MG/1
20 TABLET, EXTENDED RELEASE ORAL ONCE
Status: COMPLETED | OUTPATIENT
Start: 2021-09-13 | End: 2021-09-13

## 2021-09-13 RX ORDER — CYCLOBENZAPRINE HCL 10 MG
10 TABLET ORAL 3 TIMES DAILY PRN
Qty: 25 TABLET | Refills: 0 | Status: SHIPPED | OUTPATIENT
Start: 2021-09-13 | End: 2021-09-24

## 2021-09-13 RX ORDER — LIDOCAINE 4 G/G
2 PATCH TOPICAL
Status: DISCONTINUED | OUTPATIENT
Start: 2021-09-13 | End: 2021-09-20 | Stop reason: HOSPADM

## 2021-09-13 RX ORDER — ACETAMINOPHEN 325 MG/1
650 TABLET ORAL EVERY 4 HOURS PRN
COMMUNITY
Start: 2021-09-14

## 2021-09-13 RX ORDER — CYCLOBENZAPRINE HCL 10 MG
10 TABLET ORAL 3 TIMES DAILY
Status: DISCONTINUED | OUTPATIENT
Start: 2021-09-13 | End: 2021-09-20 | Stop reason: HOSPADM

## 2021-09-13 RX ORDER — TOPIRAMATE 25 MG/1
TABLET, FILM COATED ORAL AT BEDTIME
COMMUNITY
Start: 2021-09-13

## 2021-09-13 RX ORDER — DIAZEPAM 2 MG
2 TABLET ORAL EVERY 6 HOURS PRN
Status: DISCONTINUED | OUTPATIENT
Start: 2021-09-13 | End: 2021-09-20 | Stop reason: HOSPADM

## 2021-09-13 RX ORDER — OXYCODONE HYDROCHLORIDE 5 MG/1
5 TABLET ORAL EVERY 4 HOURS PRN
Qty: 45 TABLET | Refills: 0 | Status: SHIPPED | OUTPATIENT
Start: 2021-09-13 | End: 2021-09-24

## 2021-09-13 RX ADMIN — ONDANSETRON 4 MG: 4 TABLET, ORALLY DISINTEGRATING ORAL at 00:10

## 2021-09-13 RX ADMIN — ACETAMINOPHEN 975 MG: 325 TABLET, FILM COATED ORAL at 20:27

## 2021-09-13 RX ADMIN — CYCLOBENZAPRINE 10 MG: 10 TABLET, FILM COATED ORAL at 10:04

## 2021-09-13 RX ADMIN — ONDANSETRON 4 MG: 4 TABLET, ORALLY DISINTEGRATING ORAL at 06:15

## 2021-09-13 RX ADMIN — OXYCODONE HYDROCHLORIDE 5 MG: 5 TABLET ORAL at 09:15

## 2021-09-13 RX ADMIN — OXYCODONE HYDROCHLORIDE 5 MG: 5 TABLET ORAL at 20:27

## 2021-09-13 RX ADMIN — POTASSIUM & SODIUM PHOSPHATES POWDER PACK 280-160-250 MG 1 PACKET: 280-160-250 PACK at 10:04

## 2021-09-13 RX ADMIN — DEXAMETHASONE 3 MG: 2 TABLET ORAL at 23:01

## 2021-09-13 RX ADMIN — TOPIRAMATE 25 MG: 25 TABLET, FILM COATED ORAL at 23:01

## 2021-09-13 RX ADMIN — CYCLOBENZAPRINE 10 MG: 10 TABLET, FILM COATED ORAL at 20:27

## 2021-09-13 RX ADMIN — PHENAZOPYRIDINE 200 MG: 200 TABLET ORAL at 18:22

## 2021-09-13 RX ADMIN — LIDOCAINE 2 PATCH: 560 PATCH PERCUTANEOUS; TOPICAL; TRANSDERMAL at 12:33

## 2021-09-13 RX ADMIN — DEXAMETHASONE 3 MG: 2 TABLET ORAL at 15:14

## 2021-09-13 RX ADMIN — PHENAZOPYRIDINE 200 MG: 200 TABLET ORAL at 12:46

## 2021-09-13 RX ADMIN — CYCLOBENZAPRINE 10 MG: 10 TABLET, FILM COATED ORAL at 15:14

## 2021-09-13 RX ADMIN — DOCUSATE SODIUM 50 MG AND SENNOSIDES 8.6 MG 1 TABLET: 8.6; 5 TABLET, FILM COATED ORAL at 20:27

## 2021-09-13 RX ADMIN — POTASSIUM & SODIUM PHOSPHATES POWDER PACK 280-160-250 MG 1 PACKET: 280-160-250 PACK at 20:28

## 2021-09-13 RX ADMIN — POTASSIUM CHLORIDE 20 MEQ: 750 TABLET, EXTENDED RELEASE ORAL at 10:04

## 2021-09-13 RX ADMIN — ACETAMINOPHEN 975 MG: 325 TABLET, FILM COATED ORAL at 12:34

## 2021-09-13 RX ADMIN — ATOMOXETINE 40 MG: 10 CAPSULE ORAL at 09:13

## 2021-09-13 RX ADMIN — DEXAMETHASONE 3 MG: 2 TABLET ORAL at 06:15

## 2021-09-13 RX ADMIN — ACETAMINOPHEN 975 MG: 325 TABLET, FILM COATED ORAL at 03:12

## 2021-09-13 ASSESSMENT — ACTIVITIES OF DAILY LIVING (ADL)
ADLS_ACUITY_SCORE: 14
PREVIOUS_RESPONSIBILITIES: MEDICATION MANAGEMENT;FINANCES;MEAL PREP
ADLS_ACUITY_SCORE: 14
ADLS_ACUITY_SCORE: 14
ADLS_ACUITY_SCORE: 13
ADLS_ACUITY_SCORE: 14
ADLS_ACUITY_SCORE: 13

## 2021-09-13 NOTE — PLAN OF CARE
Status: Pt transferred from  this shift. S/p Stealth Assisted Redo supracerebellar infratentorial approach for tumor with endoscopic assistance  lumbar drain placement attempted but aborted  Vitals: VSS on RA  Neuros: Alert to lethargic and oriented x4. BUE 5/5, BLE 4/5. R eye dysconjugate gaze (MD aware).  IV: PIV Sl'd. R TL CVC in place SL'd  Labs/Electrolytes: K and Phos replaced in ICU, recheck in am.  Resp/trach: WNL on RA  Diet: Regular diet, fair appetite. Per speech needs close supervision to ensure she is fully alert and upright for all PO  Bowel status: Bs+x4, no BM this shift  : Voiding with frequency. On pyridium.   Skin: incision to posterior head, SARAHI. Bruising to bilateral forearms, blanchable redness to chest where tele patches were, Old lumbar drain site SARAHI with bruise, poke site to R heel  Pain: C/o head/incisional pain, prn oxycodone given and effective.  Activity: Up with SBA, GB  Social: Mother visited this shift.  Plan: Continue to monitor and follow POC.

## 2021-09-13 NOTE — PROGRESS NOTES
"   09/13/21 1200   Quick Adds   Type of Visit Initial Occupational Therapy Evaluation   Living Environment   People in home alone   Current Living Arrangements apartment   Home Accessibility no concerns   Transportation Anticipated family or friend will provide   Living Environment Comments Pt lives alone in an apartment on the first floor of the building, no JOHNATHAN. There are stairs in the building that are required only for when pt drops off rent. Pt's mom lives 1-1.5 blocks away. Pt has tub shower and grab rail, raised toilet seat with no grab bars, she has access to a shower chair and walker but does not use.    Self-Care   Usual Activity Tolerance moderate   Current Activity Tolerance fair   Regular Exercise No   Equipment Currently Used at Home none   Activity/Exercise/Self-Care Comment Pt was previously IND for ADLs, reported low activity levels at baseline, but IND for functional mobility. Would occasionally walk around the block and reported she completed her PT exercises most days.    Instrumental Activities of Daily Living (IADL)   Previous Responsibilities medication management;finances;meal prep   IADL Comments Per pt, her mother provides assist at baseline for I/ADLs including house  management, laundry, meal prep, driving. Pts mother comes to her apt 3 days/week, MWF, to assist with household chores. Pt does not drive, her mother will drive her if needed. Pt is responsible for med management and finances. Pt referred to her mother as her \"PCA\" but primarily assists with I/ADLs.    Disability/Function   Hearing Difficulty or Deaf no   Wear Glasses or Blind yes   Vision Management Glasses   Concentrating, Remembering or Making Decisions Difficulty no   Difficulty Communicating no   Difficulty Eating/Swallowing no   Walking or Climbing Stairs Difficulty no   Dressing/Bathing Difficulty yes   Dressing/Bathing dressing difficulty, requires equipment   Dressing/Bathing Management Uses sock aid at baseline  " "  Toileting issues no   Doing Errands Independently Difficulty (such as shopping) yes   Errands Management Pt typically recieves A from mother for errands   Fall history within last six months no   Change in Functional Status Since Onset of Current Illness/Injury yes   General Information   Onset of Illness/Injury or Date of Surgery 09/10/21   Referring Physician Brandon Lomas MD   Patient/Family Therapy Goal Statement (OT) Return to PLOF   Additional Occupational Profile Info/Pertinent History of Current Problem Per chart \"30 year old female who is now s/p Stealth Assisted Redo supracerebellar infratentorial approach for tumor with endoscopic assistance; lumbar drain placement attempted but aborted on 9/10. Has  shunt set to baseline setting of 1.5.\"   Performance Patterns (Routines, Roles, Habits) Pt lives close to her mother, about 1-1.5 blocks awat. Reported she typically spends her days watching TV and playing video games, she does not work or drive. Is IND for ADLs at baseline, recieves A from her mom for I/ADLs.    Existing Precautions/Restrictions fall;other (see comments)  (craniotomy)   General Observations and Info Act: up with A   Cognitive Status Examination   Orientation Status orientation to person, place and time   Affect/Mental Status (Cognitive) WFL   Follows Commands WFL   Cognitive Status Comments Baseline cognitive deficits, difficult to assess baseline vs current deficits   Visual Perception   Visual Impairment/Limitations WFL  (corrective lenses on occassion when eye twitches)   Sensory   Sensory Quick Adds No deficits were identified   Pain Assessment   Patient Currently in Pain Yes, see Vital Sign flowsheet   Integumentary/Edema   Integumentary/Edema no deficits were identifed   Posture   Posture not impaired   Range of Motion Comprehensive   General Range of Motion no range of motion deficits identified   Strength Comprehensive (MMT)   General Manual Muscle Testing (MMT) Assessment no " strength deficits identified   Comment, General Manual Muscle Testing (MMT) Assessment WFL for ADLs   Muscle Tone Assessment   Muscle Tone Quick Adds No deficits were identified   Coordination   Upper Extremity Coordination No deficits were identified   Bed Mobility   Bed Mobility supine-sit   Supine-Sit [place] (Bed Mobility) supervision   Assistive Device (Bed Mobility) bed rails   Comment (Bed Mobility) SBA supine <> sit with bed rails   Transfers   Transfers sit-stand transfer;toilet transfer   Sit-Stand Transfer   Sit-Stand [place] (Transfers) contact guard   Assistive Device (Sit-Stand Transfers) walker, standard   Sit/Stand Transfer Comments CGA STS with VC for safe walker use   Toilet Transfer   Type (Toilet Transfer) sit-stand   [place] Level (Toilet Transfer) minimum assist (75% patient effort)   Assistive Device (Toilet Transfer) grab bars/safety frame;walker, front-wheeled   Toilet Transfer Comments CGA-Min A for STS from toilet    Balance   Balance Assessment standing balance: dynamic   Standing Balance: Dynamic fair balance   Balance Comments somewhat unsteady gait, pt requesting to use 2WW for balance ambulating   Activities of Daily Living   BADL Assessment toileting;grooming   Grooming Assessment   [place] Level (Grooming) contact guard assist   Position (Grooming) unsupported standing   Comment (Grooming) CGA g/h task standing at the sink   Toileting   [place] Level (Toileting) verbal cues   Assistive Devices (Toileting) grab bar, toilet   Position (Toileting) unsupported sitting   Comment (Toileting) CGA-Min A STS from toilet, CGA for clothing management, VC and SBA for pericared    Clinical Impression   Criteria for Skilled Therapeutic Interventions Met (OT) yes;meets criteria;skilled treatment is necessary   OT Diagnosis Decreased IND for ADLs with post op precautions   OT Problem List-Impairments impacting ADL problems related to;activity tolerance  impaired;balance;cognition;mobility;strength;pain;post-surgical precautions   Assessment of Occupational Performance 3-5 Performance Deficits   Identified Performance Deficits toileting, grooming/hygeine, bathing, house management, functional mobility   Planned Therapy Interventions (OT) ADL retraining;IADL retraining;strengthening;home program guidelines;transfer training;progressive activity/exercise   Clinical Decision Making Complexity (OT) low complexity   Therapy Frequency (OT) 5x/week   Predicted Duration of Therapy 2 weeks   Risk & Benefits of therapy have been explained evaluation/treatment results reviewed;care plan/treatment goals reviewed;risks/benefits reviewed;current/potential barriers reviewed;participants included;patient   Comment-Clinical Impression Pt presents below baseline for ADLs with post op precautions and increased fall risk d/t balance deficits    OT Discharge Planning    OT Discharge Recommendation (DC Rec) Transitional Care Facility   OT Rationale for DC Rec Pt is below baseline for functional mobility and ADL independence, would benefit from additional skilled therapies to progress IND and overall activity tolerace prior to safe d/c home. Per pt report, her mother is not able to provide 24/7 assist at home. Pt currently requires CGA-Min A for functional mobility and transfers.    OT Brief overview of current status  STS CGA, short ambulation with CGA- Min A with 2WW   Total Evaluation Time (Minutes)   Total Evaluation Time (Minutes) 8

## 2021-09-13 NOTE — PROGRESS NOTES
[facility], [place]   09/13/2021  Neurosurgery Progress Note:    Assessment:  Mita Higgins is a 30 year old female who is now s/p Stealth Assisted Redo supracerebellar infratentorial approach for tumor with endoscopic assistance; lumbar drain placement attempted but aborted on 9/10. Has  shunt set to baseline setting of 1.5.    Plan:  - Serial neuro exams  - Pain control  - HOB > 30 degrees  - Decadron 8 day taper  - Advance diet as tolerated  - Bowel regimen  - PRN antiemetics  - PT/OT  - Started phenazopyridine   - SCDs for DVT proph    -----------------------------------  Marine Leblanc MD  Neurosurgery Resident    Please contact neurosurgery resident on call with questions.    Dial * * [phone], enter [phone] when prompted.   -----------------------------------    Interval History: Transferred to floor    Objective:   Temp:  [95.8  F (35.4  C)-98.7  F (37.1  C)] 96.5  F (35.8  C)  Pulse:  [] 80  Resp:  [11-32] 18  BP: (109-131)/(63-91) 109/63  SpO2:  [93 %-100 %] 96 %  I/O last 3 completed shifts:  In: 465 [P.O.:120; I.V.:345]  Out: 1310 [Urine:1310]    Gen: Appears comfortable, NAD  Wound: clean, dry, intact  Neurologic:  - Alert & Oriented to person, place  - Follows commands  - No gaze preference. No apparent hemineglect.  - PERRL,   - Face symmetric     Del Tr Bi WE WF Gr   R 5 5 5 5 5 5   L 5 5 5 5 5 5    HF KE KF DF PF EHL   R 5 5 5 5 5 5   L 5 5 5 5 5 5     Sensation intact and symmetric to light touch throughout    LABS:  Recent Labs   Lab 09/12/21  0406 09/11/21  1635 09/11/21  1206 09/11/21  0259 09/10/21  2126 09/10/21  1826 09/10/21  0725     --   --  140 139  --  140   POTASSIUM 3.5  --   --  3.6  3.6 3.4*  --  4.0   CHLORIDE 112*  --   --  109  --   --  106   CO2 23  --   --  22  --   --  24   ANIONGAP 8  --   --  9  --   --  10   * 121* 130* 130* 176*   < > 104*   BUN 7  --   --  5*  --   --  9   CR 0.73  --   --  0.70  --   --  0.83   ALEXA 8.3*   --   --  8.0*  --   --  9.2    < > = values in this interval not displayed.       Recent Labs   Lab 09/12/21  1642 09/12/21  0406   WBC  --  12.5*   RBC  --  3.74*   HGB 11.0* 10.8*   HCT  --  31.6*   MCV  --  85   MCH  --  28.9   MCHC  --  34.2   RDW  --  12.6   PLT  --  216       IMAGING:  No results found for this or any previous visit (from the past 24 hour(s)).

## 2021-09-13 NOTE — DISCHARGE SUMMARY
[facility] Discharge Summary and Instructions    Mita Higgins MRN# [id number]   Age: 30 year old YOB: 1990     Date of Admission:  9/10/2021  Date of Discharge::  9/20/2021  Admitting Physician:  Kris Pinzon MD  Discharge Physician:  Kris Pinzon MD          Admission Diagnoses:   Meningioma (H) [D32.9]  Compression of brain (H) [G93.5]  Injury of trochlear nerve, unspecified laterality, subsequent encounter [S04.20XD]          Discharge Diagnosis:     Meningioma (H) [D32.9]  Compression of brain (H) [G93.5]  Injury of trochlear nerve, unspecified laterality, subsequent encounter [S04.20XD]     In addition to the assessment of diagnoses detailed above, this 30 year old female  patient admitted from home who has the following conditions contributing to the complexity of their medical care:      1. Multiple meningiomas  2. 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 Obstructive hydrocephalus s/p  shunt placement (Strata 1.5)  4. Right trochlear nerve palsy  5. Brainstem compression           Procedures:   9/10/2021  Stealth Assisted Redo supracerebellar infratentorial approach for tumor with endoscopic assistance           Brief History of Illness:   30 year old left-handed female who was diagnosed with a large bilateral petroclival meningioma after presenting with headaches. She is here today with her sister. She initially underwent a right pterional craniotomy with Dr. Smith in 2010 for biopsy, which revealed WHO grade 1 meningioma. She then was found with obstructive hydrocephalus and underwent a left frontal  shunt placement (Strata 1.5) with Dr. Jaffe in 2012. Then later in 2012, she underwent a right paramedian supracerebellar, infratentorial approach by Dr. Edwards and Dr. Anderson for debulking. The fourth nerve was cut during this operation in 2012.   Over the past several years, her right petrotentorial meningioma was  found to be growing so she was referred for operative management. Given its growth and her young age, we discussed the options for management including continued observation, re-irradiation, and surgical resection. After a full discussion of risks, benefits, and alternatives, She agreed to proceed with surgery. Informed consent was obtained.          Hospital Course:   Following surgery the patient was transferred to the neurosurgical ICU. On POD #1 the patient was extubated and unfortunately failed bedside swallow study initially.  As the patient's mental status improved she was able to upgrade to a regular diet.  On POD #2 the patient was transferred to the general neurosurgical floor.  Patient complained of incisional pain and headache which was controlled with oral analgesia.  Post operatively the patient was started on a decadron taper secondary to cerebral edema which was completed prior to discharge.   The patient was evaluated by PT and OT who felt that due to mobility deficits the patient would benefit from ongoing home health therapy.   Post operatively the patient did initially note bladder spasm, however this resolved prior to discharge.    Prior to discharge the patient was tolerating diet, voiding, passing bowel movements, mobilizing with assistance, and medically and neurologically stable.             Discharge Medications:     Current Discharge Medication List      START taking these medications    Details   cyclobenzaprine (FLEXERIL) 10 MG tablet Take 1 tablet (10 mg) by mouth 3 times daily as needed for muscle spasms  Qty: 25 tablet, Refills: 0    Associated Diagnoses: Meningioma (H)      oxyCODONE (ROXICODONE) 5 MG tablet Take 1 tablet (5 mg) by mouth every 4 hours as needed for moderate to severe pain (moderate pain (pain rating 4-6))  Qty: 45 tablet, Refills: 0    Associated Diagnoses: Meningioma (H)      polyethylene glycol (MIRALAX) 17 g packet Take 17 g by mouth daily    Comments:  Constipation      senna-docusate (SENOKOT-S/PERICOLACE) 8.6-50 MG tablet Take 1 tablet by mouth 2 times daily    Comments: Constipation         CONTINUE these medications which have CHANGED    Details   acetaminophen (TYLENOL) 325 MG tablet Take 2 tablets (650 mg) by mouth every 4 hours as needed for pain (For optimal non-opioid multimodal pain management to improve pain control.)      atomoxetine (STRATTERA) 40 MG capsule Take 1 capsule (40 mg) by mouth every morning Hold DOS 12-19    Comments: adhd      topiramate (TOPAMAX) 25 MG tablet Take by mouth At Bedtime TAKE ONE TABLET ORALLY AT EACH BEDTIME    Associated Diagnoses: Chronic daily headache         STOP taking these medications       ibuprofen (ADVIL/MOTRIN) 800 MG tablet Comments:   Reason for Stopping:           Objective:   Temp:  [95.8  F (35.4  C)-98.7  F (37.1  C)] 96.5  F (35.8  C)  Pulse:  [] 80  Resp:  [11-32] 18  BP: (109-131)/(63-91) 109/63  SpO2:  [93 %-100 %] 96 %  I/O last 3 completed shifts:  In: 465 [P.O.:120; I.V.:345]  Out: 1310 [Urine:1310]     Gen: Appears comfortable, NAD  Wound: clean, dry, intact  Neurologic:  - Alert & Oriented to person, place  - Follows commands  - No gaze preference. No apparent hemineglect.  - PERRL,   - Face symmetric       Del Tr Bi WE WF Gr   R 5 5 5 5 5 5   L 5 5 5 5 5 5     HF KE KF DF PF EHL   R 5 5 5 5 5 5   L 5 5 5 5 5 5      Sensation intact and symmetric to light touch throughout             Discharge Instructions and Follow-Up:     Discharge diet: Regular   Discharge activity: You may advance activity as tolerated. No strenuous exercise or heay lifting greater than 10 lbs for 4 weeks or until seen and cleared in clinic.   Discharge follow-up: Follow-up with Gloria Astorga PA-C on 9/24/2021 @ 1145 am for wound check/post-hospital evaluation         Wound care: Ok to shower,however no scrubbing of the wound and no soaking of the wound, meaning no bathtubs or swimming pools. Pat dry only. Leave wound  open to air.  Patient to have wound checked 2 weeks following surgery.    Wound location: posterior cervical wound  Closure technique: nylon  Dressing needs: none  Post-op care at follow-up: Keep dry and clean     Please call if you have:  1. increased pain, redness, drainage, swelling at your incision  2. fevers > 101.5 F degrees  3. with any questions or concerns.  You may reach the Neurosurgery clinic at [phone] during regular work hours. ER at [phone].    and ask for the Neurosurgery Resident on call at [phone], during off hours or weekends.         Discharge Disposition:     Discharged to short-term care facility        LOUISE Carvajal, CNP  Department of Neurosurgery  Pager: [phone]

## 2021-09-13 NOTE — PLAN OF CARE
Status: Pt s/p redo supracerebellar infratentorial approach for tumor with endoscopic assistance, lumbar drain placement attempted but aborted  Vitals: VSS on RA  Neuros: Alert to lethargic and oriented x4. 5/5 throughout. R eye dysconjugate gaze  IV: PIV SL. R TL CVC SL  Labs/Electrolytes: K and Phos recheck this AM  Resp/trach: WNL  Diet: Regular diet. Per speech needs close supervision to ensure she is fully alert and upright for all PO  Bowel status: No BM overnight   : Voiding with frequency. On pyridium   Skin: Incision to posterior head, SARAHI. Old lumbar drain site SARAHI. Bruising throughout  Pain: Tylenol given x1 for HA   Activity: Up with SBA, GB  Plan: Continue with POC

## 2021-09-14 ENCOUNTER — APPOINTMENT (OUTPATIENT)
Dept: PHYSICAL THERAPY | Facility: CLINIC | Age: 31
DRG: 025 | End: 2021-09-14
Attending: NEUROLOGICAL SURGERY
Payer: MEDICARE

## 2021-09-14 ENCOUNTER — APPOINTMENT (OUTPATIENT)
Dept: OCCUPATIONAL THERAPY | Facility: CLINIC | Age: 31
DRG: 025 | End: 2021-09-14
Attending: NEUROLOGICAL SURGERY
Payer: MEDICARE

## 2021-09-14 LAB
ANION GAP SERPL CALCULATED.3IONS-SCNC: 5 MMOL/L (ref 3–14)
ATRIAL RATE - MUSE: 78 BPM
BUN SERPL-MCNC: 12 MG/DL (ref 7–30)
CALCIUM SERPL-MCNC: 8.6 MG/DL (ref 8.5–10.1)
CHLORIDE BLD-SCNC: 110 MMOL/L (ref 94–109)
CO2 SERPL-SCNC: 25 MMOL/L (ref 20–32)
CREAT SERPL-MCNC: 0.84 MG/DL (ref 0.52–1.04)
DIASTOLIC BLOOD PRESSURE - MUSE: NORMAL MMHG
ERYTHROCYTE [DISTWIDTH] IN BLOOD BY AUTOMATED COUNT: 12.4 % (ref 10–15)
GFR SERPL CREATININE-BSD FRML MDRD: >90 ML/MIN/1.73M2
GLUCOSE BLD-MCNC: 106 MG/DL (ref 70–99)
HCT VFR BLD AUTO: 32.4 % (ref 35–47)
HGB BLD-MCNC: 10.9 G/DL (ref 11.7–15.7)
INTERPRETATION ECG - MUSE: NORMAL
MAGNESIUM SERPL-MCNC: 2 MG/DL (ref 1.6–2.3)
MCH RBC QN AUTO: 29.1 PG (ref 26.5–33)
MCHC RBC AUTO-ENTMCNC: 33.6 G/DL (ref 31.5–36.5)
MCV RBC AUTO: 86 FL (ref 78–100)
P AXIS - MUSE: 29 DEGREES
PHOSPHATE SERPL-MCNC: 3.4 MG/DL (ref 2.5–4.5)
PLATELET # BLD AUTO: 245 10E3/UL (ref 150–450)
POTASSIUM BLD-SCNC: 3.9 MMOL/L (ref 3.4–5.3)
PR INTERVAL - MUSE: 176 MS
QRS DURATION - MUSE: 110 MS
QT - MUSE: 396 MS
QTC - MUSE: 451 MS
R AXIS - MUSE: 77 DEGREES
RBC # BLD AUTO: 3.75 10E6/UL (ref 3.8–5.2)
SODIUM SERPL-SCNC: 140 MMOL/L (ref 133–144)
SYSTOLIC BLOOD PRESSURE - MUSE: NORMAL MMHG
T AXIS - MUSE: 37 DEGREES
VENTRICULAR RATE- MUSE: 78 BPM
WBC # BLD AUTO: 8.9 10E3/UL (ref 4–11)

## 2021-09-14 PROCEDURE — 83735 ASSAY OF MAGNESIUM: CPT | Performed by: STUDENT IN AN ORGANIZED HEALTH CARE EDUCATION/TRAINING PROGRAM

## 2021-09-14 PROCEDURE — 80048 BASIC METABOLIC PNL TOTAL CA: CPT | Performed by: STUDENT IN AN ORGANIZED HEALTH CARE EDUCATION/TRAINING PROGRAM

## 2021-09-14 PROCEDURE — 97535 SELF CARE MNGMENT TRAINING: CPT | Mod: GO

## 2021-09-14 PROCEDURE — 250N000013 HC RX MED GY IP 250 OP 250 PS 637: Performed by: STUDENT IN AN ORGANIZED HEALTH CARE EDUCATION/TRAINING PROGRAM

## 2021-09-14 PROCEDURE — 97530 THERAPEUTIC ACTIVITIES: CPT | Mod: GP

## 2021-09-14 PROCEDURE — 250N000012 HC RX MED GY IP 250 OP 636 PS 637: Performed by: STUDENT IN AN ORGANIZED HEALTH CARE EDUCATION/TRAINING PROGRAM

## 2021-09-14 PROCEDURE — 84100 ASSAY OF PHOSPHORUS: CPT | Performed by: STUDENT IN AN ORGANIZED HEALTH CARE EDUCATION/TRAINING PROGRAM

## 2021-09-14 PROCEDURE — 120N000002 HC R&B MED SURG/OB UMMC

## 2021-09-14 PROCEDURE — 36415 COLL VENOUS BLD VENIPUNCTURE: CPT | Performed by: STUDENT IN AN ORGANIZED HEALTH CARE EDUCATION/TRAINING PROGRAM

## 2021-09-14 PROCEDURE — 250N000013 HC RX MED GY IP 250 OP 250 PS 637: Performed by: NURSE PRACTITIONER

## 2021-09-14 PROCEDURE — 97116 GAIT TRAINING THERAPY: CPT | Mod: GP

## 2021-09-14 PROCEDURE — 85027 COMPLETE CBC AUTOMATED: CPT | Performed by: STUDENT IN AN ORGANIZED HEALTH CARE EDUCATION/TRAINING PROGRAM

## 2021-09-14 RX ORDER — IBUPROFEN 600 MG/1
600 TABLET, FILM COATED ORAL EVERY 6 HOURS PRN
COMMUNITY
Start: 2021-09-14

## 2021-09-14 RX ORDER — IBUPROFEN 600 MG/1
600 TABLET, FILM COATED ORAL EVERY 6 HOURS PRN
Status: DISCONTINUED | OUTPATIENT
Start: 2021-09-14 | End: 2021-09-20 | Stop reason: HOSPADM

## 2021-09-14 RX ADMIN — CYCLOBENZAPRINE 10 MG: 10 TABLET, FILM COATED ORAL at 14:07

## 2021-09-14 RX ADMIN — OXYCODONE HYDROCHLORIDE 5 MG: 5 TABLET ORAL at 08:27

## 2021-09-14 RX ADMIN — TOPIRAMATE 25 MG: 25 TABLET, FILM COATED ORAL at 21:16

## 2021-09-14 RX ADMIN — MAGNESIUM HYDROXIDE 30 ML: 400 SUSPENSION ORAL at 20:01

## 2021-09-14 RX ADMIN — LIDOCAINE 2 PATCH: 560 PATCH PERCUTANEOUS; TOPICAL; TRANSDERMAL at 08:37

## 2021-09-14 RX ADMIN — IBUPROFEN 600 MG: 600 TABLET, FILM COATED ORAL at 15:12

## 2021-09-14 RX ADMIN — POLYETHYLENE GLYCOL 3350 17 G: 17 POWDER, FOR SOLUTION ORAL at 08:27

## 2021-09-14 RX ADMIN — DEXAMETHASONE 3 MG: 2 TABLET ORAL at 06:00

## 2021-09-14 RX ADMIN — DEXAMETHASONE 3 MG: 2 TABLET ORAL at 21:16

## 2021-09-14 RX ADMIN — ATOMOXETINE 40 MG: 10 CAPSULE ORAL at 08:27

## 2021-09-14 RX ADMIN — CYCLOBENZAPRINE 10 MG: 10 TABLET, FILM COATED ORAL at 08:27

## 2021-09-14 RX ADMIN — DEXAMETHASONE 3 MG: 2 TABLET ORAL at 14:07

## 2021-09-14 RX ADMIN — DOCUSATE SODIUM 50 MG AND SENNOSIDES 8.6 MG 1 TABLET: 8.6; 5 TABLET, FILM COATED ORAL at 08:28

## 2021-09-14 RX ADMIN — DOCUSATE SODIUM 50 MG AND SENNOSIDES 8.6 MG 1 TABLET: 8.6; 5 TABLET, FILM COATED ORAL at 19:59

## 2021-09-14 RX ADMIN — CYCLOBENZAPRINE 10 MG: 10 TABLET, FILM COATED ORAL at 19:59

## 2021-09-14 ASSESSMENT — ACTIVITIES OF DAILY LIVING (ADL)
ADLS_ACUITY_SCORE: 13

## 2021-09-14 NOTE — PLAN OF CARE
Pt s/p redo supracerebellar infratentorial approach for tumor resection.  Afebrile, VSS on RA.  Alert and oriented x4.  5/5 throughout.  Oxycodone 5 mg and Tylenol each given x1 for headache pain.  Zofran given x1 for nausea.  Tolerating regular diet.  Per speech pt. Needs close supervision while eating.  No Bm this shift.  Voiding adequate amount.  Up with SBA and GB.  Continue plan of care.

## 2021-09-14 NOTE — PROGRESS NOTES
[facility], [place]   09/14/2021  Neurosurgery Progress Note:    Assessment:  Mita Higgins is a 30 year old female who is now s/p Stealth Assisted Redo supracerebellar infratentorial approach for tumor with endoscopic assistance; lumbar drain placement attempted but aborted on 9/10. Has  shunt set to baseline setting of 1.5.    Plan:  - Serial neuro exams  - Pain control  - HOB > 30 degrees  - Decadron 8 day taper  - Advance diet as tolerated  - Bowel regimen  - PRN antiemetics  - PT/OT: dispo to rehab  - Started phenazopyridine   - SCDs for DVT proph    -----------------------------------  Marine Leblanc MD  Neurosurgery Resident    Please contact neurosurgery resident on call with questions.    Dial * * [phone], enter [phone] when prompted.   -----------------------------------    Interval History: Pain control    Objective:   Temp:  [97.1  F (36.2  C)-97.6  F (36.4  C)] 97.3  F (36.3  C)  Pulse:  [62-88] 69  Resp:  [14-16] 14  BP: (113-135)/(60-90) 116/68  SpO2:  [94 %-98 %] 96 %  I/O last 3 completed shifts:  In: 420 [P.O.:420]  Out: 450 [Urine:450]    Gen: Appears comfortable, NAD  Wound: clean, dry, intact  Neurologic:  - Alert & Oriented to person, place  - Follows commands  - No gaze preference. No apparent hemineglect.  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 (pre-op)  - Face symmetric     Del Tr Bi WE WF Gr   R 5 5 5 5 5 5   L 5 5 5 5 5 5    HF KE KF DF PF EHL   R 5 5 5 5 5 5   L 5 5 5 5 5 5     Sensation intact and symmetric to light touch throughout    LABS:  Recent Labs   Lab 09/13/21  0828 09/12/21  0406 09/11/21  1635 09/11/21  0259    143  --  140   POTASSIUM 3.6 3.5  --  3.6  3.6   CHLORIDE 112* 112*  --  109   CO2 27 23  --  22   ANIONGAP 3 8  --  9   * 120* 121* 130*   BUN 9 7  --  5*   CR 0.74 0.73  --  0.70   ALEXA 8.9 8.3*  --  8.0*       Recent Labs   Lab 09/13/21  0828   WBC 12.1*   RBC 3.75*   HGB 11.2*   HCT 32.5*   MCV 87   MCH 29.9   MCHC 34.5   RDW 12.6           IMAGING:  No results found for this or any previous visit (from the past 24 hour(s)).

## 2021-09-14 NOTE — PLAN OF CARE
Status: Pt s/p redo supracerebellar infratentorial approach for tumor with endoscopic assistance, lumbar drain placement attempted but aborted  Vitals: VSS on RA  Neuros: Alert to lethargic and oriented x4. 5/5 throughout. R eye dysconjugate gaze  IV: PIV SL  Resp/trach: WNL  Diet: Regular diet. 1:1  Bowel status: No BM overnight   : Voiding with frequency, improving  Skin: Incision to posterior head, SARAHI. Bruising throughout  Pain: Head and neck pain controlled with tylenol, flexeril, and oxycodone   Activity: Up with SBA, GB and walker   Plan: Continue with POC

## 2021-09-15 ENCOUNTER — APPOINTMENT (OUTPATIENT)
Dept: PHYSICAL THERAPY | Facility: CLINIC | Age: 31
DRG: 025 | End: 2021-09-15
Attending: NEUROLOGICAL SURGERY
Payer: MEDICARE

## 2021-09-15 ENCOUNTER — APPOINTMENT (OUTPATIENT)
Dept: OCCUPATIONAL THERAPY | Facility: CLINIC | Age: 31
DRG: 025 | End: 2021-09-15
Attending: NEUROLOGICAL SURGERY
Payer: MEDICARE

## 2021-09-15 LAB
ANION GAP SERPL CALCULATED.3IONS-SCNC: 6 MMOL/L (ref 3–14)
BUN SERPL-MCNC: 16 MG/DL (ref 7–30)
CALCIUM SERPL-MCNC: 9 MG/DL (ref 8.5–10.1)
CHLORIDE BLD-SCNC: 106 MMOL/L (ref 94–109)
CO2 SERPL-SCNC: 26 MMOL/L (ref 20–32)
CREAT SERPL-MCNC: 0.86 MG/DL (ref 0.52–1.04)
ERYTHROCYTE [DISTWIDTH] IN BLOOD BY AUTOMATED COUNT: 12.1 % (ref 10–15)
GFR SERPL CREATININE-BSD FRML MDRD: >90 ML/MIN/1.73M2
GLUCOSE BLD-MCNC: 124 MG/DL (ref 70–99)
HCT VFR BLD AUTO: 35.7 % (ref 35–47)
HGB BLD-MCNC: 12.1 G/DL (ref 11.7–15.7)
MAGNESIUM SERPL-MCNC: 2.4 MG/DL (ref 1.6–2.3)
MCH RBC QN AUTO: 29.5 PG (ref 26.5–33)
MCHC RBC AUTO-ENTMCNC: 33.9 G/DL (ref 31.5–36.5)
MCV RBC AUTO: 87 FL (ref 78–100)
PATH REPORT.COMMENTS IMP SPEC: NORMAL
PATH REPORT.COMMENTS IMP SPEC: NORMAL
PATH REPORT.FINAL DX SPEC: NORMAL
PATH REPORT.GROSS SPEC: NORMAL
PATH REPORT.MICROSCOPIC SPEC OTHER STN: NORMAL
PHOSPHATE SERPL-MCNC: 3.4 MG/DL (ref 2.5–4.5)
PHOTO IMAGE: NORMAL
PLATELET # BLD AUTO: 287 10E3/UL (ref 150–450)
POTASSIUM BLD-SCNC: 3.9 MMOL/L (ref 3.4–5.3)
RBC # BLD AUTO: 4.1 10E6/UL (ref 3.8–5.2)
SODIUM SERPL-SCNC: 138 MMOL/L (ref 133–144)
WBC # BLD AUTO: 9.7 10E3/UL (ref 4–11)

## 2021-09-15 PROCEDURE — 83735 ASSAY OF MAGNESIUM: CPT | Performed by: STUDENT IN AN ORGANIZED HEALTH CARE EDUCATION/TRAINING PROGRAM

## 2021-09-15 PROCEDURE — 84100 ASSAY OF PHOSPHORUS: CPT | Performed by: STUDENT IN AN ORGANIZED HEALTH CARE EDUCATION/TRAINING PROGRAM

## 2021-09-15 PROCEDURE — 97530 THERAPEUTIC ACTIVITIES: CPT | Mod: GP

## 2021-09-15 PROCEDURE — 250N000013 HC RX MED GY IP 250 OP 250 PS 637: Performed by: STUDENT IN AN ORGANIZED HEALTH CARE EDUCATION/TRAINING PROGRAM

## 2021-09-15 PROCEDURE — 88360 TUMOR IMMUNOHISTOCHEM/MANUAL: CPT | Mod: 26 | Performed by: SPECIALIST

## 2021-09-15 PROCEDURE — 36415 COLL VENOUS BLD VENIPUNCTURE: CPT | Performed by: STUDENT IN AN ORGANIZED HEALTH CARE EDUCATION/TRAINING PROGRAM

## 2021-09-15 PROCEDURE — 97116 GAIT TRAINING THERAPY: CPT | Mod: GP

## 2021-09-15 PROCEDURE — 88342 IMHCHEM/IMCYTCHM 1ST ANTB: CPT | Mod: 26 | Performed by: SPECIALIST

## 2021-09-15 PROCEDURE — 250N000013 HC RX MED GY IP 250 OP 250 PS 637: Performed by: NURSE PRACTITIONER

## 2021-09-15 PROCEDURE — 80048 BASIC METABOLIC PNL TOTAL CA: CPT | Performed by: STUDENT IN AN ORGANIZED HEALTH CARE EDUCATION/TRAINING PROGRAM

## 2021-09-15 PROCEDURE — 88307 TISSUE EXAM BY PATHOLOGIST: CPT | Mod: 26 | Performed by: SPECIALIST

## 2021-09-15 PROCEDURE — 97530 THERAPEUTIC ACTIVITIES: CPT | Mod: GO | Performed by: OCCUPATIONAL THERAPIST

## 2021-09-15 PROCEDURE — 85027 COMPLETE CBC AUTOMATED: CPT | Performed by: STUDENT IN AN ORGANIZED HEALTH CARE EDUCATION/TRAINING PROGRAM

## 2021-09-15 PROCEDURE — 250N000012 HC RX MED GY IP 250 OP 636 PS 637: Performed by: STUDENT IN AN ORGANIZED HEALTH CARE EDUCATION/TRAINING PROGRAM

## 2021-09-15 PROCEDURE — 120N000002 HC R&B MED SURG/OB UMMC

## 2021-09-15 RX ORDER — POLYETHYLENE GLYCOL 3350 17 G/17G
17 POWDER, FOR SOLUTION ORAL 3 TIMES DAILY
Status: DISCONTINUED | OUTPATIENT
Start: 2021-09-15 | End: 2021-09-20 | Stop reason: HOSPADM

## 2021-09-15 RX ORDER — AMOXICILLIN 250 MG
3 CAPSULE ORAL 2 TIMES DAILY
Status: DISCONTINUED | OUTPATIENT
Start: 2021-09-15 | End: 2021-09-20 | Stop reason: HOSPADM

## 2021-09-15 RX ORDER — MAGNESIUM CARB/ALUMINUM HYDROX 105-160MG
296 TABLET,CHEWABLE ORAL ONCE
Status: COMPLETED | OUTPATIENT
Start: 2021-09-15 | End: 2021-09-15

## 2021-09-15 RX ADMIN — DOCUSATE SODIUM 50 MG AND SENNOSIDES 8.6 MG 1 TABLET: 8.6; 5 TABLET, FILM COATED ORAL at 07:54

## 2021-09-15 RX ADMIN — TOPIRAMATE 25 MG: 25 TABLET, FILM COATED ORAL at 21:33

## 2021-09-15 RX ADMIN — ATOMOXETINE 40 MG: 10 CAPSULE ORAL at 07:55

## 2021-09-15 RX ADMIN — IBUPROFEN 600 MG: 600 TABLET, FILM COATED ORAL at 20:18

## 2021-09-15 RX ADMIN — CYCLOBENZAPRINE 10 MG: 10 TABLET, FILM COATED ORAL at 20:15

## 2021-09-15 RX ADMIN — DOCUSATE SODIUM 50 MG AND SENNOSIDES 8.6 MG 3 TABLET: 8.6; 5 TABLET, FILM COATED ORAL at 20:15

## 2021-09-15 RX ADMIN — CYCLOBENZAPRINE 10 MG: 10 TABLET, FILM COATED ORAL at 07:54

## 2021-09-15 RX ADMIN — MAGNESIUM CITRATE 296 ML: 1.75 LIQUID ORAL at 11:42

## 2021-09-15 RX ADMIN — DEXAMETHASONE 2 MG: 2 TABLET ORAL at 21:33

## 2021-09-15 RX ADMIN — POLYETHYLENE GLYCOL 3350 17 G: 17 POWDER, FOR SOLUTION ORAL at 07:53

## 2021-09-15 RX ADMIN — POLYETHYLENE GLYCOL 3350 17 G: 17 POWDER, FOR SOLUTION ORAL at 20:15

## 2021-09-15 RX ADMIN — CYCLOBENZAPRINE 10 MG: 10 TABLET, FILM COATED ORAL at 14:45

## 2021-09-15 RX ADMIN — POLYETHYLENE GLYCOL 3350 17 G: 17 POWDER, FOR SOLUTION ORAL at 14:45

## 2021-09-15 RX ADMIN — IBUPROFEN 600 MG: 600 TABLET, FILM COATED ORAL at 00:46

## 2021-09-15 RX ADMIN — DEXAMETHASONE 2 MG: 2 TABLET ORAL at 14:45

## 2021-09-15 RX ADMIN — DEXAMETHASONE 2 MG: 2 TABLET ORAL at 06:14

## 2021-09-15 RX ADMIN — IBUPROFEN 600 MG: 600 TABLET, FILM COATED ORAL at 11:47

## 2021-09-15 RX ADMIN — LIDOCAINE 2 PATCH: 560 PATCH PERCUTANEOUS; TOPICAL; TRANSDERMAL at 07:53

## 2021-09-15 ASSESSMENT — ACTIVITIES OF DAILY LIVING (ADL)
ADLS_ACUITY_SCORE: 13

## 2021-09-15 NOTE — DISCHARGE SUMMARY
[facility] Discharge Summary and Instructions    Mita Higgins MRN# [id number]   Age: 30 year old YOB: 1990     Date of Admission:  9/10/2021  Date of Discharge::  9/20/2021  Admitting Physician:  Kris Pinzon MD  Discharge Physician:  Kris Pinzon MD          Admission Diagnoses:   Meningioma (H) [D32.9]  Compression of brain (H) [G93.5]  Injury of trochlear nerve, unspecified laterality, subsequent encounter [S04.20XD]  Developmental delay  S/p  shunt set at 1.5  AVN of hip           Discharge Diagnosis:     S/p Stealth Assisted Redo supracerebellar infratentorial approach for tumor with endoscopic assistance; lumbar drain placement attempted but aborted on 9/10. Has  shunt set to baseline setting of 1.5.         Procedures:   Stealth Assisted Redo supracerebellar infratentorial approach for tumor with endoscopic assistance  lumbar drain placement attempted but aborted           Brief History of Illness:   Ms. Higgins is a 30 year old left-handed female who was diagnosed with a large bilateral petroclival meningioma after presenting with headaches. She is here today with her sister. She initially underwent a right pterional craniotomy with Dr. Smith in 2010 for biopsy, which revealed WHO grade 1 meningioma. She then was found with obstructive hydrocephalus and underwent a left frontal  shunt placement (Strata 1.5) with Dr. Jaffe in 2012. Then later in 2012, she underwent a right paramedian supracerebellar, infratentorial approach by Dr. Edwards and Dr. Anderson for debulking. The fourth nerve was cut during this operation in 2012.   Over the past several years, her right petrotentorial meningioma was found to be growing so she was referred for operative management. Given its growth and her young age, we discussed the options for management including continued observation, re-irradiation, and surgical resection. After a full discussion of risks, benefits, and  alternatives, She agreed to proceed with surgery. Informed consent was obtained.              Hospital Course:   Patient underwent above-mentioned procedure on 9/10/21. The operation was uncomplicated and she was admitted to the surgical ICU for routine post operative cares.   The patient was successfully extubated on POD#1.   Post operative MRI revealed good resection with small amount of residual tumor.  Pathology returned as recurrent meningioma.   The patient's strata valve was reprogrammed back to 1.5 following the MRI.  The patient was followed by PT and OT who felt patient was safe to discharge home with family and home health services.    The patient complained of incisional pain which was controlled with oral analgesia.   Prior to discharge she was ambulating, voiding without a jones, eating a regular diet, pain was well controlled and therefore she was discharged on 9/20/2021 with home health therapies.            Discharge Medications:     Current Discharge Medication List      START taking these medications    Details   cyclobenzaprine (FLEXERIL) 10 MG tablet Take 1 tablet (10 mg) by mouth 3 times daily as needed for muscle spasms  Qty: 25 tablet, Refills: 0    Associated Diagnoses: Meningioma (H)      oxyCODONE (ROXICODONE) 5 MG tablet Take 1 tablet (5 mg) by mouth every 4 hours as needed for moderate to severe pain (moderate pain (pain rating 4-6))  Qty: 45 tablet, Refills: 0    Associated Diagnoses: Meningioma (H)      polyethylene glycol (MIRALAX) 17 g packet Take 17 g by mouth daily    Comments: Constipation      senna-docusate (SENOKOT-S/PERICOLACE) 8.6-50 MG tablet Take 1 tablet by mouth 2 times daily    Comments: Constipation         CONTINUE these medications which have CHANGED    Details   acetaminophen (TYLENOL) 325 MG tablet Take 2 tablets (650 mg) by mouth every 4 hours as needed for pain (For optimal non-opioid multimodal pain management to improve pain control.)      atomoxetine  (STRATTERA) 40 MG capsule Take 1 capsule (40 mg) by mouth every morning Hold DOS 12-19    Comments: adhd      ibuprofen (ADVIL/MOTRIN) 600 MG tablet Take 1 tablet (600 mg) by mouth every 6 hours as needed for moderate pain      topiramate (TOPAMAX) 25 MG tablet Take by mouth At Bedtime TAKE ONE TABLET ORALLY AT EACH BEDTIME    Associated Diagnoses: Chronic daily headache         Objective:   Temp:  [96.4  F (35.8  C)-97.8  F (36.6  C)] 96.9  F (36.1  C)  Pulse:  [68-90] 79  Resp:  [16] 16  BP: (102-109)/(54-70) 102/60  SpO2:  [92 %-99 %] 97 %  No intake/output data recorded.     Gen: Appears comfortable, NAD  Wound: clean, dry, intact  Neurologic:  - Alert & Oriented to person, place  - Follows commands  - No gaze preference. No apparent hemineglect.  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 No strenuous exercise or heay lifting greater than 10 lbs for 4 weeks or until seen and cleared in clinic.   Discharge follow-up: Follow-up with Gloria Astorga PA-C on 9/24/2021 @ 12:00       Wound care: Ok to shower,however no scrubbing of the wound and no soaking of the wound, meaning no bathtubs or swimming pools. Pat dry only. Leave wound open to air.  Patient to have wound checked 2 weeks following surgery.    Wound location: cranial  Closure technique: nylon  Dressing needs: none  Post-op care at follow-up: Keep dry and clean     Please call if you have:  1. increased pain, redness, drainage, swelling at your incision  2. fevers > 101.5 F degrees  3. with any questions or concerns.  You may reach the Neurosurgery clinic at [phone] during regular work hours. ER at [phone].    and  ask for the Neurosurgery Resident on call at [phone], during off hours or weekends.         Discharge Disposition:     Discharged to home        LOUISE Carvajal, CNP  Department of Neurosurgery  Pager: [phone]

## 2021-09-15 NOTE — PLAN OF CARE
[id number]  Status: Pt s/p redo supracerebellar infratentorial approach for tumor with endoscopic assistance, lumbar drain placement attempted but aborted 9/10  Vitals: VSS on RA  Neuros: A+O x4. 5/5 throughout. R eye dysconjugate gaze  IV: PIV SL  Resp/trach: WNL  Diet: Regular diet  Bowel status: No BM, pt unsure when LBM was, milk of mag given  : Voiding   Skin: Incision to posterior head, SARAHI. Bruising throughout  Pain: Head and neck pain controlled with flexeril in these 4 hours   Activity: Up with SBA, GB and walker   Plan: Continue with POC

## 2021-09-15 NOTE — PLAN OF CARE
Status: S/p redo supracerebellar infratentorial approach for tumor with endoscopic assistance, lumbar drain placement attempted but aborted 9/10  Vitals: AVSS, RA.   Neuros: Intact ex R eye dysconjugate gaze.   IV: PIV SL.   Labs/Electrolytes: WDL.   Resp/trach: LS CTA.   Diet: Regular diet.   Bowel status: Passing gas, last BM unknown. Senna, miralax, and mag citrate given.   : Voiding spontaneously.  Skin: Posterior head/neck incision sutured, SARAHI. Skin bruised t/o.   Pain: PRN Ibuprofen given for HA.  Activity: Up with SBA GB. Endorses dizziness with position changes.  Social: No visitors.   Plan: Continue with plan of care.

## 2021-09-15 NOTE — PLAN OF CARE
"/50 (BP Location: Right arm)   Pulse 62   Temp 96.8  F (36  C) (Oral)   Resp 16   Ht 1.702 m (5' 7\")   Wt 99.1 kg (218 lb 7.6 oz)   SpO2 96%   BMI 34.22 kg/m      AVSS. Gave ibuprofen per pt request for prevention of HA. Denies nausea. Up SBA to bathroom. Neuros intact. R eye dysconjugate present. L PIV SL. LBM unknown, milk of mag given last deirdre with no results yet. Rec suppository this am when pt is awake. Incision in posterior head CDI. No drainage. 4 side rails up per pt request. Will continue with POC.   "

## 2021-09-15 NOTE — PROGRESS NOTES
[facility], [place]   09/15/2021  Neurosurgery Progress Note:    Assessment:  Mita Higgins is a 30 year old female who is now s/p Stealth Assisted Redo supracerebellar infratentorial approach for tumor with endoscopic assistance; lumbar drain placement attempted but aborted on 9/10. Has  shunt set to baseline setting of 1.5.    Plan:  - Serial neuro exams  - Pain control  - HOB > 30 degrees  - Decadron 8 day taper  - Advance diet as tolerated  - Bowel regimen  - PRN antiemetics  - PT/OT: dispo to rehab  - Started phenazopyridine   - SCDs for DVT proph  - Medically ready for discharge    -----------------------------------  Marine Leblanc MD  Neurosurgery Resident    Please contact neurosurgery resident on call with questions.    Dial * * *[phone], enter [phone] when prompted.   -----------------------------------    Interval History: Pain control    Objective:   Temp:  [96.3  F (35.7  C)-98.1  F (36.7  C)] 98.1  F (36.7  C)  Pulse:  [64-77] 67  Resp:  [14-16] 16  BP: (102-116)/(50-81) 106/64  SpO2:  [96 %-99 %] 96 %  No intake/output data recorded.    Gen: Appears comfortable, NAD  Wound: clean, dry, intact  Neurologic:  - Alert & Oriented to person, place  - Follows commands  - No gaze preference. No apparent hemineglect.  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 (pre-op)  - Face symmetric     Del Tr Bi WE WF Gr   R 5 5 5 5 5 5   L 5 5 5 5 5 5    HF KE KF DF PF EHL   R 5 5 5 5 5 5   L 5 5 5 5 5 5     Sensation intact and symmetric to light touch throughout    LABS:  Recent Labs   Lab 09/14/21  0726 09/13/21  0828 09/12/21  0406    142 143   POTASSIUM 3.9 3.6 3.5   CHLORIDE 110* 112* 112*   CO2 25 27 23   ANIONGAP 5 3 8   * 110* 120*   BUN 12 9 7   CR 0.84 0.74 0.73   ALEXA 8.6 8.9 8.3*       Recent Labs   Lab 09/14/21  0726   WBC 8.9   RBC 3.75*   HGB 10.9*   HCT 32.4*   MCV 86   MCH 29.1   MCHC 33.6   RDW 12.4          IMAGING:  No results found for this or any previous visit  (from the past 24 hour(s)).

## 2021-09-16 LAB
ANION GAP SERPL CALCULATED.3IONS-SCNC: 5 MMOL/L (ref 3–14)
BUN SERPL-MCNC: 21 MG/DL (ref 7–30)
CALCIUM SERPL-MCNC: 8.8 MG/DL (ref 8.5–10.1)
CHLORIDE BLD-SCNC: 106 MMOL/L (ref 94–109)
CO2 SERPL-SCNC: 26 MMOL/L (ref 20–32)
CREAT SERPL-MCNC: 0.84 MG/DL (ref 0.52–1.04)
ERYTHROCYTE [DISTWIDTH] IN BLOOD BY AUTOMATED COUNT: 12.1 % (ref 10–15)
GFR SERPL CREATININE-BSD FRML MDRD: >90 ML/MIN/1.73M2
GLUCOSE BLD-MCNC: 103 MG/DL (ref 70–99)
HCT VFR BLD AUTO: 33.8 % (ref 35–47)
HGB BLD-MCNC: 11.6 G/DL (ref 11.7–15.7)
MAGNESIUM SERPL-MCNC: 2.4 MG/DL (ref 1.6–2.3)
MCH RBC QN AUTO: 29.5 PG (ref 26.5–33)
MCHC RBC AUTO-ENTMCNC: 34.3 G/DL (ref 31.5–36.5)
MCV RBC AUTO: 86 FL (ref 78–100)
PHOSPHATE SERPL-MCNC: 4.1 MG/DL (ref 2.5–4.5)
PLATELET # BLD AUTO: 277 10E3/UL (ref 150–450)
POTASSIUM BLD-SCNC: 4 MMOL/L (ref 3.4–5.3)
RBC # BLD AUTO: 3.93 10E6/UL (ref 3.8–5.2)
SODIUM SERPL-SCNC: 137 MMOL/L (ref 133–144)
WBC # BLD AUTO: 9.8 10E3/UL (ref 4–11)

## 2021-09-16 PROCEDURE — 85027 COMPLETE CBC AUTOMATED: CPT | Performed by: STUDENT IN AN ORGANIZED HEALTH CARE EDUCATION/TRAINING PROGRAM

## 2021-09-16 PROCEDURE — 250N000013 HC RX MED GY IP 250 OP 250 PS 637: Performed by: STUDENT IN AN ORGANIZED HEALTH CARE EDUCATION/TRAINING PROGRAM

## 2021-09-16 PROCEDURE — 36415 COLL VENOUS BLD VENIPUNCTURE: CPT | Performed by: STUDENT IN AN ORGANIZED HEALTH CARE EDUCATION/TRAINING PROGRAM

## 2021-09-16 PROCEDURE — 250N000012 HC RX MED GY IP 250 OP 636 PS 637: Performed by: STUDENT IN AN ORGANIZED HEALTH CARE EDUCATION/TRAINING PROGRAM

## 2021-09-16 PROCEDURE — 999N000127 HC STATISTIC PERIPHERAL IV START W US GUIDANCE

## 2021-09-16 PROCEDURE — 83735 ASSAY OF MAGNESIUM: CPT | Performed by: STUDENT IN AN ORGANIZED HEALTH CARE EDUCATION/TRAINING PROGRAM

## 2021-09-16 PROCEDURE — 84100 ASSAY OF PHOSPHORUS: CPT | Performed by: STUDENT IN AN ORGANIZED HEALTH CARE EDUCATION/TRAINING PROGRAM

## 2021-09-16 PROCEDURE — 250N000013 HC RX MED GY IP 250 OP 250 PS 637: Performed by: NURSE PRACTITIONER

## 2021-09-16 PROCEDURE — 120N000002 HC R&B MED SURG/OB UMMC

## 2021-09-16 PROCEDURE — 80048 BASIC METABOLIC PNL TOTAL CA: CPT | Performed by: STUDENT IN AN ORGANIZED HEALTH CARE EDUCATION/TRAINING PROGRAM

## 2021-09-16 RX ORDER — LACTULOSE 10 G/10G
20 SOLUTION ORAL 3 TIMES DAILY
Status: DISCONTINUED | OUTPATIENT
Start: 2021-09-16 | End: 2021-09-20 | Stop reason: HOSPADM

## 2021-09-16 RX ADMIN — CYCLOBENZAPRINE 10 MG: 10 TABLET, FILM COATED ORAL at 14:38

## 2021-09-16 RX ADMIN — ATOMOXETINE 40 MG: 10 CAPSULE ORAL at 08:35

## 2021-09-16 RX ADMIN — CYCLOBENZAPRINE 10 MG: 10 TABLET, FILM COATED ORAL at 20:54

## 2021-09-16 RX ADMIN — DOCUSATE SODIUM 50 MG AND SENNOSIDES 8.6 MG 3 TABLET: 8.6; 5 TABLET, FILM COATED ORAL at 08:35

## 2021-09-16 RX ADMIN — POLYETHYLENE GLYCOL 3350 17 G: 17 POWDER, FOR SOLUTION ORAL at 08:34

## 2021-09-16 RX ADMIN — MAGNESIUM HYDROXIDE 30 ML: 400 SUSPENSION ORAL at 08:35

## 2021-09-16 RX ADMIN — DEXAMETHASONE 2 MG: 2 TABLET ORAL at 06:02

## 2021-09-16 RX ADMIN — IBUPROFEN 600 MG: 600 TABLET, FILM COATED ORAL at 14:38

## 2021-09-16 RX ADMIN — TOPIRAMATE 25 MG: 25 TABLET, FILM COATED ORAL at 21:34

## 2021-09-16 RX ADMIN — DEXAMETHASONE 2 MG: 2 TABLET ORAL at 14:37

## 2021-09-16 RX ADMIN — DEXAMETHASONE 2 MG: 2 TABLET ORAL at 21:46

## 2021-09-16 RX ADMIN — IBUPROFEN 600 MG: 600 TABLET, FILM COATED ORAL at 08:34

## 2021-09-16 RX ADMIN — CYCLOBENZAPRINE 10 MG: 10 TABLET, FILM COATED ORAL at 08:35

## 2021-09-16 RX ADMIN — IBUPROFEN 600 MG: 600 TABLET, FILM COATED ORAL at 21:34

## 2021-09-16 RX ADMIN — LIDOCAINE 2 PATCH: 560 PATCH PERCUTANEOUS; TOPICAL; TRANSDERMAL at 08:35

## 2021-09-16 ASSESSMENT — ACTIVITIES OF DAILY LIVING (ADL)
ADLS_ACUITY_SCORE: 10
ADLS_ACUITY_SCORE: 13
ADLS_ACUITY_SCORE: 13
ADLS_ACUITY_SCORE: 10
ADLS_ACUITY_SCORE: 13
ADLS_ACUITY_SCORE: 10

## 2021-09-16 NOTE — PLAN OF CARE
Status: S/p redo supracerebellar infratentorial approach for tumor with endoscopic assistance, lumbar drain placement attempted but aborted 9/10  Vitals: AVSS, RA.   Neuros: Intact ex R eye dysconjugate gaze.   IV: PIV SL.   Labs/Electrolytes: WDL.   Resp/trach: LS CTA.   Diet: Regular diet.   Bowel status: Passing gas, last BM unknown. Senna, miralax, and mag citrate given. Pt wanting pink lady tomorrow morning if still no BM.  : Voiding spontaneously.  Skin: Posterior head/neck incision sutured, SARAHI. Skin bruised t/o.   Pain: PRN Ibuprofen given for HA.  Activity: Up with SBA GB. Endorses dizziness with position changes.  Social: No visitors.   Plan: Continue with plan of care.

## 2021-09-16 NOTE — PROGRESS NOTES
[facility], [place]   09/16/2021  Neurosurgery Progress Note:    Assessment:  Mita Higgins is a 30 year old female who is now s/p Stealth Assisted Redo supracerebellar infratentorial approach for tumor with endoscopic assistance; lumbar drain placement attempted but aborted on 9/10. Has  shunt set to baseline setting of 1.5.    Plan:  - Serial neuro exams  - Pain control  - Decadron 8 day taper  - Advance diet as tolerated  - Bowel regimen  - PRN antiemetics  - PT/OT: dispo to rehab  - SCDs for DVT proph  - Medically ready for discharge    -----------------------------------  Marine Leblanc MD  Neurosurgery Resident    Please contact neurosurgery resident on call with questions.    Dial * * *[phone], enter [phone] when prompted.   -----------------------------------    Interval History: SRINIVASA    Objective:   Temp:  [96.8  F (36  C)-98.5  F (36.9  C)] 97.9  F (36.6  C)  Pulse:  [62-77] 77  Resp:  [15-16] 15  BP: (100-119)/(50-77) 112/67  SpO2:  [96 %-99 %] 96 %  I/O last 3 completed shifts:  In: 100 [P.O.:100]  Out: -     Gen: Appears comfortable, NAD  Wound: clean, dry, intact  Neurologic:  - Alert & Oriented to person, place  - Follows commands  - No gaze preference. No apparent hemineglect.  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 (pre-op)  - Face symmetric     Del Tr Bi WE WF Gr   R 5 5 5 5 5 5   L 5 5 5 5 5 5    HF KE KF DF PF EHL   R 5 5 5 5 5 5   L 5 5 5 5 5 5     Sensation intact and symmetric to light touch throughout    LABS:  Recent Labs   Lab 09/15/21  0817 09/14/21  0726 09/13/21  0828    140 142   POTASSIUM 3.9 3.9 3.6   CHLORIDE 106 110* 112*   CO2 26 25 27   ANIONGAP 6 5 3   * 106* 110*   BUN 16 12 9   CR 0.86 0.84 0.74   ALEXA 9.0 8.6 8.9       Recent Labs   Lab 09/15/21  0817   WBC 9.7   RBC 4.10   HGB 12.1   HCT 35.7   MCV 87   MCH 29.5   MCHC 33.9   RDW 12.1          IMAGING:  No results found for this or any previous visit (from the past 24 hour(s)).

## 2021-09-16 NOTE — PLAN OF CARE
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  Vitals: VSS on RA.   Neuros: Ax4. Intact ex R eye dysconjugate gaze, baseline  IV: PIV SL   Labs/Electrolytes: No replacement this shift  Resp/trach: WNL   Diet: Regular diet  Bowel status: Multiple loose BMs after bowel meds   : Voiding spontaneously  Skin: Posterior neck incision sutured, SARAHI  Pain: Mild HA managed well w/ibuprofen.   Activity: Ax SBA, GB, Walker. Sitting on edge of bed for meals. Walked on unit x2  Social: Pt independently updating family, no visitors this shift  Plan: Therapies recc TCU, no plan at this time; encourage activity as tolerated

## 2021-09-16 NOTE — PROGRESS NOTES
Care Management    Length of Stay (days): 6    Expected Discharge Date: 09/16/2021     Concerns to be Addressed:   Disposition planning    Patient plan of care discussed at interdisciplinary rounds: Yes    Anticipated Discharge Disposition:  Short term TCU placement   Anticipated Discharge Services:  Short Term TCU placement  Anticipated Discharge DME:  Not applicable at this time            Additional Information:  OT and Physical Therapy are recommending short term TCU placement.  Per Gabriella White NP, pt is medically ready for discharge.  FRITZ met briefly with pt who voices agreement with plan to pursue short term TCU placement and is quick to state that they wants to be placed close to home.  With pt's verbal permission, SW phoned pt's mother and left message (on cell and home phone) for mother to call. SW will complete initial assessment with pt's mother as well as discuss disposition planning.    FRITZ will follow for discharge planning.    KATIE Samson  Social Work, 6A  Phone:  [phone]  Pager:  [phone]  9/16/2021          GABRIELLA Mcneal

## 2021-09-16 NOTE — PLAN OF CARE
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  Vitals: VSS on RA.   Neuros: Intact ex R eye dysconjugate gaze. A&Ox4, 5/5 strength throughout, denies N/T.  IV: PIV SLd, placed New IV this shift.   Labs/Electrolytes: Electrolyte replacement protocol in place, WDL.  Resp/trach: WNL.   Diet: Regular diet.   Bowel status: No BM, unsure when last BM was, BM meds given on evening shift, awaiting BM. To give pink lady enema in AM per pt, if still no BM. BS+, Rounded, non-distended abdomen.    : Voiding spontaneously.   Skin: Posterior head/neck incision sutured, Bruising/old incisions throughout.   Pain: Endorses headache, reports will take ibuprofen in AM.   Activity: Up SBA, GB, Walker. Ambulated to bathroom x1, per report gets dizzy with position changed.   Social: Pt cooperative, particular about cares. Emotional/crying regarding linen change this shift, able to easily redirect.  Plan: Continue to monitor and follow plan of care.   Updates: New PIV.

## 2021-09-17 ENCOUNTER — APPOINTMENT (OUTPATIENT)
Dept: PHYSICAL THERAPY | Facility: CLINIC | Age: 31
DRG: 025 | End: 2021-09-17
Attending: NEUROLOGICAL SURGERY
Payer: MEDICARE

## 2021-09-17 LAB
ANION GAP SERPL CALCULATED.3IONS-SCNC: 5 MMOL/L (ref 3–14)
BUN SERPL-MCNC: 17 MG/DL (ref 7–30)
CALCIUM SERPL-MCNC: 8.6 MG/DL (ref 8.5–10.1)
CHLORIDE BLD-SCNC: 108 MMOL/L (ref 94–109)
CO2 SERPL-SCNC: 25 MMOL/L (ref 20–32)
CREAT SERPL-MCNC: 0.79 MG/DL (ref 0.52–1.04)
ERYTHROCYTE [DISTWIDTH] IN BLOOD BY AUTOMATED COUNT: 12.3 % (ref 10–15)
GFR SERPL CREATININE-BSD FRML MDRD: >90 ML/MIN/1.73M2
GLUCOSE BLD-MCNC: 90 MG/DL (ref 70–99)
HCT VFR BLD AUTO: 33.4 % (ref 35–47)
HGB BLD-MCNC: 11.5 G/DL (ref 11.7–15.7)
MAGNESIUM SERPL-MCNC: 2.3 MG/DL (ref 1.6–2.3)
MCH RBC QN AUTO: 30.3 PG (ref 26.5–33)
MCHC RBC AUTO-ENTMCNC: 34.4 G/DL (ref 31.5–36.5)
MCV RBC AUTO: 88 FL (ref 78–100)
PHOSPHATE SERPL-MCNC: 3.6 MG/DL (ref 2.5–4.5)
PLATELET # BLD AUTO: 253 10E3/UL (ref 150–450)
POTASSIUM BLD-SCNC: 4.1 MMOL/L (ref 3.4–5.3)
RBC # BLD AUTO: 3.8 10E6/UL (ref 3.8–5.2)
SODIUM SERPL-SCNC: 138 MMOL/L (ref 133–144)
WBC # BLD AUTO: 10.6 10E3/UL (ref 4–11)

## 2021-09-17 PROCEDURE — 83735 ASSAY OF MAGNESIUM: CPT | Performed by: STUDENT IN AN ORGANIZED HEALTH CARE EDUCATION/TRAINING PROGRAM

## 2021-09-17 PROCEDURE — 250N000013 HC RX MED GY IP 250 OP 250 PS 637: Performed by: STUDENT IN AN ORGANIZED HEALTH CARE EDUCATION/TRAINING PROGRAM

## 2021-09-17 PROCEDURE — 80048 BASIC METABOLIC PNL TOTAL CA: CPT | Performed by: STUDENT IN AN ORGANIZED HEALTH CARE EDUCATION/TRAINING PROGRAM

## 2021-09-17 PROCEDURE — 250N000012 HC RX MED GY IP 250 OP 636 PS 637: Performed by: STUDENT IN AN ORGANIZED HEALTH CARE EDUCATION/TRAINING PROGRAM

## 2021-09-17 PROCEDURE — 84100 ASSAY OF PHOSPHORUS: CPT | Performed by: STUDENT IN AN ORGANIZED HEALTH CARE EDUCATION/TRAINING PROGRAM

## 2021-09-17 PROCEDURE — 250N000011 HC RX IP 250 OP 636: Performed by: STUDENT IN AN ORGANIZED HEALTH CARE EDUCATION/TRAINING PROGRAM

## 2021-09-17 PROCEDURE — 120N000002 HC R&B MED SURG/OB UMMC

## 2021-09-17 PROCEDURE — 97116 GAIT TRAINING THERAPY: CPT | Mod: GP

## 2021-09-17 PROCEDURE — 36415 COLL VENOUS BLD VENIPUNCTURE: CPT | Performed by: STUDENT IN AN ORGANIZED HEALTH CARE EDUCATION/TRAINING PROGRAM

## 2021-09-17 PROCEDURE — 97530 THERAPEUTIC ACTIVITIES: CPT | Mod: GP

## 2021-09-17 PROCEDURE — 85027 COMPLETE CBC AUTOMATED: CPT | Performed by: STUDENT IN AN ORGANIZED HEALTH CARE EDUCATION/TRAINING PROGRAM

## 2021-09-17 PROCEDURE — 250N000013 HC RX MED GY IP 250 OP 250 PS 637: Performed by: NURSE PRACTITIONER

## 2021-09-17 RX ORDER — HEPARIN SODIUM 5000 [USP'U]/.5ML
5000 INJECTION, SOLUTION INTRAVENOUS; SUBCUTANEOUS EVERY 8 HOURS
Status: DISCONTINUED | OUTPATIENT
Start: 2021-09-17 | End: 2021-09-20 | Stop reason: HOSPADM

## 2021-09-17 RX ADMIN — DEXAMETHASONE 1 MG: 1 TABLET ORAL at 13:34

## 2021-09-17 RX ADMIN — ATOMOXETINE 40 MG: 10 CAPSULE ORAL at 09:19

## 2021-09-17 RX ADMIN — DEXAMETHASONE 1 MG: 1 TABLET ORAL at 06:24

## 2021-09-17 RX ADMIN — DEXAMETHASONE 1 MG: 1 TABLET ORAL at 18:13

## 2021-09-17 RX ADMIN — MENTHOL 1 PATCH: 205.5 PATCH TOPICAL at 02:48

## 2021-09-17 RX ADMIN — CYCLOBENZAPRINE 10 MG: 10 TABLET, FILM COATED ORAL at 13:34

## 2021-09-17 RX ADMIN — TOPIRAMATE 25 MG: 25 TABLET, FILM COATED ORAL at 22:00

## 2021-09-17 RX ADMIN — HEPARIN SODIUM 5000 UNITS: 5000 INJECTION, SOLUTION INTRAVENOUS; SUBCUTANEOUS at 20:34

## 2021-09-17 RX ADMIN — CYCLOBENZAPRINE 10 MG: 10 TABLET, FILM COATED ORAL at 20:34

## 2021-09-17 RX ADMIN — IBUPROFEN 600 MG: 600 TABLET, FILM COATED ORAL at 04:02

## 2021-09-17 RX ADMIN — CYCLOBENZAPRINE 10 MG: 10 TABLET, FILM COATED ORAL at 09:19

## 2021-09-17 ASSESSMENT — ACTIVITIES OF DAILY LIVING (ADL)
ADLS_ACUITY_SCORE: 10
ADLS_ACUITY_SCORE: 10
ADLS_ACUITY_SCORE: 12
ADLS_ACUITY_SCORE: 10

## 2021-09-17 NOTE — PROGRESS NOTES
Care Management Initial Consult    General Information  Assessment completed with: Parents,  (Pt's mother (Adelaida))  Type of / Visit: Initial Assessment    Primary Care Provider verified and updated as needed: Yes   Readmission within the last 30 days: no previous admission in last 30 days      Reason for Consult: discharge planning  Advance Care Planning: Advance Care Planning Reviewed:  (Pt does not have a health care directive)          Communication Assessment  Patient's communication style: spoken language (English or Bilingual)    Hearing Difficulty or Deaf: no   Wear Glasses or Blind: yes    Cognitive  Cognitive/Neuro/Behavioral: WDL  Level of Consciousness: alert  Arousal Level: opens eyes spontaneously  Orientation: oriented x 4  Mood/Behavior: labile  Best Language: 0 - No aphasia  Speech: clear, spontaneous, logical    Living Environment:   People in home: alone     Current living Arrangements: apartment      Able to return to prior arrangements: yes       Family/Social Support:  Care provided by:  (PCA)  Provides care for: no one  Marital Status: Single   (Parents, siblings)          Description of Support System: Involved    Support Assessment: Adequate family and caregiver support    Current Resources:   Patient receiving home care services:       Community Resources:  (State mental health facility services, [facility])  Equipment currently used at home:  (Cane, RTS, hh shower nozzel, grab bars, reacher, sock aide)  Supplies currently used at home:      Employment/Financial:  Employment Status: disabled     Employment/ Comments:  (Patient did not serve in the )  Financial Concerns: No concerns identified   Referral to Financial Counselor: No       Lifestyle & Psychosocial Needs:  Social Determinants of Health     Tobacco Use: Low Risk      Smoking Tobacco Use: Never Smoker     Smokeless Tobacco Use: Never Used   Alcohol Use:      Frequency of Alcohol Consumption:      Average Number of Drinks:      Frequency  of Binge Drinking:    Financial Resource Strain:      Difficulty of Paying Living Expenses:    Food Insecurity:      Worried About Running Out of Food in the Last Year:      Ran Out of Food in the Last Year:    Transportation Needs:      Lack of Transportation (Medical):      Lack of Transportation (Non-Medical):    Physical Activity:      Days of Exercise per Week:      Minutes of Exercise per Session:    Stress:      Feeling of Stress :    Social Connections:      Frequency of Communication with Friends and Family:      Frequency of Social Gatherings with Friends and Family:      Attends Mu-ism Services:      Active Member of Clubs or Organizations:      Attends Club or Organization Meetings:      Marital Status:    Intimate Partner Violence:      Fear of Current or Ex-Partner:      Emotionally Abused:      Physically Abused:      Sexually Abused:    Depression: Not at risk     PHQ-2 Score: 0   Housing Stability:      Unable to Pay for Housing in the Last Year:      Number of Places Lived in the Last Year:      Unstable Housing in the Last Year:        Functional Status:  Prior to admission patient needed assistance:   Dependent ADLs::  (Indep with mobility/adl's prior to hospitalization)  Dependent IADLs::  (PCA completed  and shopping)       Mental Health Status:  Mental Health Status:  (Per chart, history of depression)       Chemical Dependency Status:  Chemical Dependency Status:  (No past/present CD history)             Values/Beliefs:  Spiritual, Cultural Beliefs, Mu-ism Practices, Values that affect care: yes  Description of Beliefs that Will Affect Care:     Cultural/Mu-ism Practices Patient Routinely Participates In:  (Sandie)       Additional Information:  FRITZ phoned pt's mother and completed initial assessment.  Prior to hospitalization, pt was living alone in a 1st floor apt. There are no steps to enter the building. Laundry facility's are on the 1st floor.  Pt's  bath has a tub/shower combo.  Prior to hospitalization, pt was indep with all mobility (no assistive device, no falls in the past year) and adl's.  Pt's mother is pt's paid PCA (authorized to provide 14 hours of service per week) and assists pt with housekeeping, laundry, and shopping tasks. Pt was indep with meal preparation, medication administration and with financial mgnt.  Pt's mother provides pt with transportation.  Pt owns a cane, RTS grab bars in the bathroom, hh shower nozzle, reacher and a sock aide.  Pt has a handicapped parking certificae.  Pt has a [employer] SW (Vivian [phone]).   Pt receives SSD benefits.  Pt is Jainism.  Pt did not serve in the .  Pt does not have CD history.  Pt has history of depression treated with medication.  Pt's support system includes:  - Mother (Adelaida) lives in [place]  - Father (Richmond) lives in [place]  - Stepfather (Juancho), lives in [place]  - Step Grand parent (Fide)  - Brother (Bora) lives in [place]  - Sister (María), lives in [place].      Disposition planning was discussed. Both pt and mother support plan to pursue short term rehab placement.  SW emailed pt's mother a Medicare Care Compare list.  TCU facility preferences in order of preference include:   [facility].    SW will plan to refer to above facility's.  SW will follow for discharge planning.    KATIE Samson  Social Work, 6A  Phone:  [phone]  Pager:  [phone]  9/17/2021        GABRIELLA Mcneal

## 2021-09-17 NOTE — PROGRESS NOTES
"SPIRITUAL HEALTH SERVICES  SPIRITUAL ASSESSMENT Progress Note  [facility] ([place]) 6A     Referral Source: Follow Up Visit ( Initiated visit)    Pt was focused on getting to see the  as she prepares for discharge to \"physical therapy\". Pt expressed how important it was to her that she go to \"physical therapy close to home\" because it would be easier for her mom and step dad.    Pt. Talked about her family - she is a middle child and loves being an aunt to her nieces and nephews.     Pt. Expressed frustration about the time she spent in the intensive care unit.     Pt. Discussed the stuffed animals on her bed, their origins and their names.Pt. Expressed excitement about being asked about her stuffed animals. They help her bed feel very \"comfy\" . Patient stated that the animals go \"everywhere with her\". Her \"hospital bunny\" is \"Charly\", and her \"hospital bear\" is \"Susan\".     Plan: Spiritual health services remains available for any follow-up or requests.     [facility] remains available 24/7 for emergent requests/referrals, either by having the switchboard page the on-call  or by entering an ASAP/STAT consult in Epic (this will also page the on-call ).    __    Rabbi Garry Santacruz  Chaplain Resident  Pager [phone]    "

## 2021-09-17 NOTE — PROGRESS NOTES
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 Has  shunt set to baseline setting of 1.5.    Plan:  - Serial neuro exams  - Pain control  - Decadron 8 day taper  - Advance diet as tolerated  - Bowel regimen  - PRN antiemetics  - PT/OT: dispo to rehab  - SCDs for DVT proph  - Medically ready for discharge    -----------------------------------  Marine Leblanc MD  Neurosurgery Resident    Please contact neurosurgery resident on call with questions.    Dial * * [phone], enter [phone] when prompted.   -----------------------------------    Interval History: SRINIVASA    Objective:   Temp:  [97  F (36.1  C)-97.4  F (36.3  C)] 97.4  F (36.3  C)  Pulse:  [65-73] 65  Resp:  [14-18] 18  BP: (102-124)/(50-72) 116/53  SpO2:  [96 %-99 %] 97 %  I/O last 3 completed shifts:  In: 1440 [P.O.:1440]  Out: -     Gen: Appears comfortable, NAD  Wound: clean, dry, intact  Neurologic:  - Alert & Oriented to person, place  - Follows commands  - No gaze preference. No apparent hemineglect.  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 (pre-op)  - Face symmetric     Del Tr Bi WE WF Gr   R 5 5 5 5 5 5   L 5 5 5 5 5 5    HF KE KF DF PF EHL   R 5 5 5 5 5 5   L 5 5 5 5 5 5     Sensation intact and symmetric to light touch throughout    LABS:  Recent Labs   Lab 09/16/21  0807 09/15/21  0817 09/14/21  0726    138 140   POTASSIUM 4.0 3.9 3.9   CHLORIDE 106 106 110*   CO2 26 26 25   ANIONGAP 5 6 5   * 124* 106*   BUN 21 16 12   CR 0.84 0.86 0.84   ALEXA 8.8 9.0 8.6       Recent Labs   Lab 09/16/21  0807   WBC 9.8   RBC 3.93   HGB 11.6*   HCT 33.8*   MCV 86   MCH 29.5   MCHC 34.3   RDW 12.1          IMAGING:  No results found for this or any previous visit (from the past 24 hour(s)).

## 2021-09-17 NOTE — PLAN OF CARE
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  Vitals: VSS on RA   Neuros: AO x 4. R eye dysconjugate, baseline.   IV: PIV SL   Labs/Electrolytes: WNL   Resp/trach: LS clear, no SOB reported.   Diet: Regular.   Bowel status: No BM overnight, passing gas. Last BM 9/16.   : Voiding spontaneously via toilet.   Skin: Posterior head incision, sutures, SARAHI.   Pain: Icy Hot patch applied. PRN Ibuprofen x 1.   Activity: A1 w/ GB and walker.   Plan: Per NSG, medically ready for discharge. Continue to monitor and follow POC.

## 2021-09-17 NOTE — PLAN OF CARE
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  Vitals: VSS on RA   Neuros: AO x 4. R eye dysconjugate, baseline.   IV: PIV SL   Labs/Electrolytes: WNL   Resp/trach: LS clear, no SOB reported.   Diet: Regular.   Bowel status: No BM overnight, passing gas. Last BM 9/16.   : Voiding spontaneously via toilet.   Skin: Posterior head incision, sutures, SARAHI.   Pain: Pt intermittently complaining of HA, scheduled medications given  Activity: A1 w/ GB and walker.   Plan: Per NSG, medically ready for discharge. SW working with pt and family to find placement. Continue to monitor and follow POC.   Updates this shift: Pt tearful and emotional today, states she's been sleeping poorly. Anxious about placement, wants to make sure she gets d/c'd to a rehab near her parents. Refused COVID swab until placement is confirmed

## 2021-09-17 NOTE — PLAN OF CARE
Assumed care 1900 to 2330. No change since previous RN note. Medications given. Pt showered and washed hair. Waiting on icy hot patch to help with back pain.

## 2021-09-18 ENCOUNTER — APPOINTMENT (OUTPATIENT)
Dept: CT IMAGING | Facility: CLINIC | Age: 31
DRG: 025 | End: 2021-09-18
Attending: STUDENT IN AN ORGANIZED HEALTH CARE EDUCATION/TRAINING PROGRAM
Payer: MEDICARE

## 2021-09-18 ENCOUNTER — APPOINTMENT (OUTPATIENT)
Dept: OCCUPATIONAL THERAPY | Facility: CLINIC | Age: 31
DRG: 025 | End: 2021-09-18
Attending: NEUROLOGICAL SURGERY
Payer: MEDICARE

## 2021-09-18 LAB
ANION GAP SERPL CALCULATED.3IONS-SCNC: 6 MMOL/L (ref 3–14)
BUN SERPL-MCNC: 16 MG/DL (ref 7–30)
CALCIUM SERPL-MCNC: 8.8 MG/DL (ref 8.5–10.1)
CHLORIDE BLD-SCNC: 107 MMOL/L (ref 94–109)
CO2 SERPL-SCNC: 23 MMOL/L (ref 20–32)
CREAT SERPL-MCNC: 0.76 MG/DL (ref 0.52–1.04)
ERYTHROCYTE [DISTWIDTH] IN BLOOD BY AUTOMATED COUNT: 12.3 % (ref 10–15)
GFR SERPL CREATININE-BSD FRML MDRD: >90 ML/MIN/1.73M2
GLUCOSE BLD-MCNC: 98 MG/DL (ref 70–99)
HCT VFR BLD AUTO: 34.9 % (ref 35–47)
HGB BLD-MCNC: 12 G/DL (ref 11.7–15.7)
MAGNESIUM SERPL-MCNC: 2.2 MG/DL (ref 1.6–2.3)
MCH RBC QN AUTO: 29.6 PG (ref 26.5–33)
MCHC RBC AUTO-ENTMCNC: 34.4 G/DL (ref 31.5–36.5)
MCV RBC AUTO: 86 FL (ref 78–100)
PHOSPHATE SERPL-MCNC: 3.7 MG/DL (ref 2.5–4.5)
PLATELET # BLD AUTO: 306 10E3/UL (ref 150–450)
POTASSIUM BLD-SCNC: 4 MMOL/L (ref 3.4–5.3)
RBC # BLD AUTO: 4.06 10E6/UL (ref 3.8–5.2)
SARS-COV-2 RNA RESP QL NAA+PROBE: NEGATIVE
SODIUM SERPL-SCNC: 136 MMOL/L (ref 133–144)
WBC # BLD AUTO: 10.3 10E3/UL (ref 4–11)

## 2021-09-18 PROCEDURE — 250N000013 HC RX MED GY IP 250 OP 250 PS 637: Performed by: STUDENT IN AN ORGANIZED HEALTH CARE EDUCATION/TRAINING PROGRAM

## 2021-09-18 PROCEDURE — 36415 COLL VENOUS BLD VENIPUNCTURE: CPT | Performed by: STUDENT IN AN ORGANIZED HEALTH CARE EDUCATION/TRAINING PROGRAM

## 2021-09-18 PROCEDURE — 250N000011 HC RX IP 250 OP 636: Performed by: STUDENT IN AN ORGANIZED HEALTH CARE EDUCATION/TRAINING PROGRAM

## 2021-09-18 PROCEDURE — 85027 COMPLETE CBC AUTOMATED: CPT | Performed by: STUDENT IN AN ORGANIZED HEALTH CARE EDUCATION/TRAINING PROGRAM

## 2021-09-18 PROCEDURE — 250N000012 HC RX MED GY IP 250 OP 636 PS 637: Performed by: STUDENT IN AN ORGANIZED HEALTH CARE EDUCATION/TRAINING PROGRAM

## 2021-09-18 PROCEDURE — 70450 CT HEAD/BRAIN W/O DYE: CPT

## 2021-09-18 PROCEDURE — 83735 ASSAY OF MAGNESIUM: CPT | Performed by: STUDENT IN AN ORGANIZED HEALTH CARE EDUCATION/TRAINING PROGRAM

## 2021-09-18 PROCEDURE — 97535 SELF CARE MNGMENT TRAINING: CPT | Mod: GO

## 2021-09-18 PROCEDURE — 250N000013 HC RX MED GY IP 250 OP 250 PS 637: Performed by: NURSE PRACTITIONER

## 2021-09-18 PROCEDURE — 84100 ASSAY OF PHOSPHORUS: CPT | Performed by: STUDENT IN AN ORGANIZED HEALTH CARE EDUCATION/TRAINING PROGRAM

## 2021-09-18 PROCEDURE — 80048 BASIC METABOLIC PNL TOTAL CA: CPT | Performed by: STUDENT IN AN ORGANIZED HEALTH CARE EDUCATION/TRAINING PROGRAM

## 2021-09-18 PROCEDURE — 120N000002 HC R&B MED SURG/OB UMMC

## 2021-09-18 PROCEDURE — U0003 INFECTIOUS AGENT DETECTION BY NUCLEIC ACID (DNA OR RNA); SEVERE ACUTE RESPIRATORY SYNDROME CORONAVIRUS 2 (SARS-COV-2) (CORONAVIRUS DISEASE [COVID-19]), AMPLIFIED PROBE TECHNIQUE, MAKING USE OF HIGH THROUGHPUT TECHNOLOGIES AS DESCRIBED BY CMS-2020-01-R: HCPCS | Performed by: STUDENT IN AN ORGANIZED HEALTH CARE EDUCATION/TRAINING PROGRAM

## 2021-09-18 PROCEDURE — 97530 THERAPEUTIC ACTIVITIES: CPT | Mod: GO

## 2021-09-18 PROCEDURE — 70450 CT HEAD/BRAIN W/O DYE: CPT | Mod: 26 | Performed by: RADIOLOGY

## 2021-09-18 RX ADMIN — CYCLOBENZAPRINE 10 MG: 10 TABLET, FILM COATED ORAL at 08:51

## 2021-09-18 RX ADMIN — DEXAMETHASONE 1 MG: 1 TABLET ORAL at 17:29

## 2021-09-18 RX ADMIN — TOPIRAMATE 25 MG: 25 TABLET, FILM COATED ORAL at 22:24

## 2021-09-18 RX ADMIN — DEXAMETHASONE 1 MG: 1 TABLET ORAL at 05:52

## 2021-09-18 RX ADMIN — HEPARIN SODIUM 5000 UNITS: 5000 INJECTION, SOLUTION INTRAVENOUS; SUBCUTANEOUS at 04:27

## 2021-09-18 RX ADMIN — CYCLOBENZAPRINE 10 MG: 10 TABLET, FILM COATED ORAL at 14:17

## 2021-09-18 RX ADMIN — ATOMOXETINE 40 MG: 10 CAPSULE ORAL at 08:52

## 2021-09-18 RX ADMIN — CYCLOBENZAPRINE 10 MG: 10 TABLET, FILM COATED ORAL at 19:48

## 2021-09-18 RX ADMIN — DEXAMETHASONE 1 MG: 1 TABLET ORAL at 00:20

## 2021-09-18 RX ADMIN — IBUPROFEN 600 MG: 600 TABLET, FILM COATED ORAL at 19:48

## 2021-09-18 RX ADMIN — LIDOCAINE 2 PATCH: 560 PATCH PERCUTANEOUS; TOPICAL; TRANSDERMAL at 08:51

## 2021-09-18 RX ADMIN — HEPARIN SODIUM 5000 UNITS: 5000 INJECTION, SOLUTION INTRAVENOUS; SUBCUTANEOUS at 12:39

## 2021-09-18 RX ADMIN — HEPARIN SODIUM 5000 UNITS: 5000 INJECTION, SOLUTION INTRAVENOUS; SUBCUTANEOUS at 19:48

## 2021-09-18 RX ADMIN — DEXAMETHASONE 1 MG: 1 TABLET ORAL at 12:39

## 2021-09-18 ASSESSMENT — ACTIVITIES OF DAILY LIVING (ADL)
ADLS_ACUITY_SCORE: 10

## 2021-09-18 NOTE — PROGRESS NOTES
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 Has  shunt set to baseline setting of 1.5.    Plan:  - Serial neuro exams  - Pain control  - Decadron 8 day taper  - Advance diet as tolerated  - Bowel regimen  - PRN antiemetics  - PT/OT: dispo to rehab  - SCDs for DVT proph  - Medically ready for discharge    -----------------------------------  Marine Leblanc MD  Neurosurgery Resident    Please contact neurosurgery resident on call with questions.    Dial * * *[phone], enter [phone] when prompted.   -----------------------------------    Interval History: SRINIVASA    Objective:   Temp:  [96.6  F (35.9  C)-97.8  F (36.6  C)] 97.7  F (36.5  C)  Pulse:  [65-87] 79  Resp:  [16-18] 16  BP: (100-116)/(52-66) 100/52  SpO2:  [97 %-98 %] 98 %  No intake/output data recorded.    Gen: Appears comfortable, NAD  Wound: clean, dry, intact  Neurologic:  - Alert & Oriented to person, place  - Follows commands  - No gaze preference. No apparent hemineglect.  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 (pre-op)  - Face symmetric     Del Tr Bi WE WF Gr   R 5 5 5 5 5 5   L 5 5 5 5 5 5    HF KE KF DF PF EHL   R 5 5 5 5 5 5   L 5 5 5 5 5 5     Sensation intact and symmetric to light touch throughout    LABS:  Recent Labs   Lab 09/17/21  0715 09/16/21  0807 09/15/21  0817    137 138   POTASSIUM 4.1 4.0 3.9   CHLORIDE 108 106 106   CO2 25 26 26   ANIONGAP 5 5 6   GLC 90 103* 124*   BUN 17 21 16   CR 0.79 0.84 0.86   ALEXA 8.6 8.8 9.0       Recent Labs   Lab 09/17/21  0715   WBC 10.6   RBC 3.80   HGB 11.5*   HCT 33.4*   MCV 88   MCH 30.3   MCHC 34.4   RDW 12.3          IMAGING:  No results found for this or any previous visit (from the past 24 hour(s)).  I have seen the patient with  the resident and have discussed and agree with this note.  AMP

## 2021-09-18 NOTE — PLAN OF CARE
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  Vitals: VSS on RA   Neuros: AO x 4. R eye dysconjugate, baseline.   IV: PIV SL   Labs/Electrolytes: WNL   Resp/trach: LS clear, no SOB reported.   Diet: Regular.   Bowel status: BS+, no BM this shift. Last BM 9/17. Refused all BM overnight d/t previous shift loose BM.  : Voiding spontaneously via toilet.   Skin: Posterior head incision, sutures, SARAHI.   Pain: Pt intermittently complaining of HA, scheduled medications given  Activity: A1 w/ GB and walker.   Social: Sleeping in between cares. Patient liked to be educated on medications, even if given previously.  Plan: Per NSG, medically ready for discharge. SW working with pt and family to find placement. Continue to monitor and follow POC.   Updates this shift: Pt tearful and emotional today when on phone with parents. Anxious about placement, wants to make sure she gets d/c'd to a rehab near her parents, only two options expressed per patient. Refused COVID swab until placement is confirmed.

## 2021-09-18 NOTE — PROGRESS NOTES
Care Management Follow Up    Length of Stay (days): 8    Expected Discharge Date: 09/20/2021     Concerns to be Addressed: discharge planning       Anticipated Discharge Disposition:  (Short term TCU placement)     Anticipated Discharge Services:  (Short term TCU placement) v. Home  Anticipated Discharge DME:  (Not applicable)    Patient/family educated on Medicare website which has current facility and service quality ratings: yes (SW emailed pt's mother a medicare care compare list)  Education Provided on the Discharge Plan:    Patient/Family in Agreement with the Plan: yes    Referrals Placed by CM/SW: Post Acute Facilities, home care  Private pay costs discussed: Not applicable    Additional Information:  This writer was paged by Dr. Villar to set up home care for patient who was previously planning to discharge to TCU.  Patient would like to discharge to home with home care. Patient states her mother is her PCA. Explained to patient that home care agencies in her area are closed until 9/20 and home care could potentially start mid week if an agency accepts her. Patient wants to be home for her [birth year] on [birth year]. One referral sent to [facility]. Unable to obtain fax numbers for other agencies. Patient would like to have an agency in [place]. RNCC to follow up with patient on 9/22 regarding other home care choices.    Jaky Jay RN, BSN  Care Coordinator, [address] Orth[address]  Phone [phone]  Pager [phone]

## 2021-09-18 NOTE — PLAN OF CARE
[id number]    Pt relaxing in bed. Pain is decreased from earlier. On the phone with Mom. No changes from previous note.

## 2021-09-18 NOTE — PLAN OF CARE
[id number]   Status: S/p Redo of supracerebellar infratentorial approach for tumor with endoscopic assistance, LD placement attempted, aborted on 9/10.  Vitals: VSS on RA   Neuros: A&O x 4. R eye dysconjugate, baseline.   IV: PIV SL   Labs/Electrolytes: WNL   Resp/trach: LS clear  Diet: Regular. good PO intake  Bowel status: BS+, LBM 9/17, loose. BM meds held.   : Voiding spontaneously to toilet.   Skin: Posterior head incision, sutures, SARAHI.   Pain: moderate HA this morning. Good relief with scheduled flexeril and lidocaine patches  Activity: A1 w/ GB and walker.   Social: calm and cooperative with cares. Needs encouragement for OOB activity. Walked in halls with OT  Plan: Medically ready for discharge. Potential discharge this weekend.  Updates this shift: Status: Covid swab obtained and sent to lab this shift.

## 2021-09-19 ENCOUNTER — APPOINTMENT (OUTPATIENT)
Dept: PHYSICAL THERAPY | Facility: CLINIC | Age: 31
DRG: 025 | End: 2021-09-19
Attending: NEUROLOGICAL SURGERY
Payer: MEDICARE

## 2021-09-19 LAB
ANION GAP SERPL CALCULATED.3IONS-SCNC: 6 MMOL/L (ref 3–14)
BUN SERPL-MCNC: 16 MG/DL (ref 7–30)
CALCIUM SERPL-MCNC: 8.7 MG/DL (ref 8.5–10.1)
CHLORIDE BLD-SCNC: 107 MMOL/L (ref 94–109)
CO2 SERPL-SCNC: 23 MMOL/L (ref 20–32)
CREAT SERPL-MCNC: 0.84 MG/DL (ref 0.52–1.04)
ERYTHROCYTE [DISTWIDTH] IN BLOOD BY AUTOMATED COUNT: 12.4 % (ref 10–15)
GFR SERPL CREATININE-BSD FRML MDRD: >90 ML/MIN/1.73M2
GLUCOSE BLD-MCNC: 96 MG/DL (ref 70–99)
HCT VFR BLD AUTO: 34.3 % (ref 35–47)
HGB BLD-MCNC: 11.8 G/DL (ref 11.7–15.7)
MAGNESIUM SERPL-MCNC: 2.1 MG/DL (ref 1.6–2.3)
MCH RBC QN AUTO: 29.4 PG (ref 26.5–33)
MCHC RBC AUTO-ENTMCNC: 34.4 G/DL (ref 31.5–36.5)
MCV RBC AUTO: 86 FL (ref 78–100)
PHOSPHATE SERPL-MCNC: 3.3 MG/DL (ref 2.5–4.5)
PLATELET # BLD AUTO: 295 10E3/UL (ref 150–450)
POTASSIUM BLD-SCNC: 4.2 MMOL/L (ref 3.4–5.3)
RBC # BLD AUTO: 4.01 10E6/UL (ref 3.8–5.2)
SODIUM SERPL-SCNC: 136 MMOL/L (ref 133–144)
WBC # BLD AUTO: 9.7 10E3/UL (ref 4–11)

## 2021-09-19 PROCEDURE — 83735 ASSAY OF MAGNESIUM: CPT | Performed by: STUDENT IN AN ORGANIZED HEALTH CARE EDUCATION/TRAINING PROGRAM

## 2021-09-19 PROCEDURE — 97750 PHYSICAL PERFORMANCE TEST: CPT | Mod: GP

## 2021-09-19 PROCEDURE — 250N000011 HC RX IP 250 OP 636: Performed by: STUDENT IN AN ORGANIZED HEALTH CARE EDUCATION/TRAINING PROGRAM

## 2021-09-19 PROCEDURE — 80048 BASIC METABOLIC PNL TOTAL CA: CPT | Performed by: STUDENT IN AN ORGANIZED HEALTH CARE EDUCATION/TRAINING PROGRAM

## 2021-09-19 PROCEDURE — 120N000002 HC R&B MED SURG/OB UMMC

## 2021-09-19 PROCEDURE — 250N000013 HC RX MED GY IP 250 OP 250 PS 637: Performed by: STUDENT IN AN ORGANIZED HEALTH CARE EDUCATION/TRAINING PROGRAM

## 2021-09-19 PROCEDURE — 36415 COLL VENOUS BLD VENIPUNCTURE: CPT | Performed by: STUDENT IN AN ORGANIZED HEALTH CARE EDUCATION/TRAINING PROGRAM

## 2021-09-19 PROCEDURE — 85027 COMPLETE CBC AUTOMATED: CPT | Performed by: STUDENT IN AN ORGANIZED HEALTH CARE EDUCATION/TRAINING PROGRAM

## 2021-09-19 PROCEDURE — 250N000012 HC RX MED GY IP 250 OP 636 PS 637: Performed by: STUDENT IN AN ORGANIZED HEALTH CARE EDUCATION/TRAINING PROGRAM

## 2021-09-19 PROCEDURE — 97116 GAIT TRAINING THERAPY: CPT | Mod: GP

## 2021-09-19 PROCEDURE — 97530 THERAPEUTIC ACTIVITIES: CPT | Mod: GP

## 2021-09-19 PROCEDURE — 84100 ASSAY OF PHOSPHORUS: CPT | Performed by: STUDENT IN AN ORGANIZED HEALTH CARE EDUCATION/TRAINING PROGRAM

## 2021-09-19 PROCEDURE — 250N000013 HC RX MED GY IP 250 OP 250 PS 637: Performed by: NURSE PRACTITIONER

## 2021-09-19 RX ADMIN — TOPIRAMATE 25 MG: 25 TABLET, FILM COATED ORAL at 21:27

## 2021-09-19 RX ADMIN — CYCLOBENZAPRINE 10 MG: 10 TABLET, FILM COATED ORAL at 08:31

## 2021-09-19 RX ADMIN — ACETAMINOPHEN 650 MG: 325 TABLET, FILM COATED ORAL at 19:33

## 2021-09-19 RX ADMIN — ACETAMINOPHEN 650 MG: 325 TABLET, FILM COATED ORAL at 04:52

## 2021-09-19 RX ADMIN — DEXAMETHASONE 1 MG: 1 TABLET ORAL at 00:50

## 2021-09-19 RX ADMIN — CYCLOBENZAPRINE 10 MG: 10 TABLET, FILM COATED ORAL at 13:53

## 2021-09-19 RX ADMIN — CYCLOBENZAPRINE 10 MG: 10 TABLET, FILM COATED ORAL at 19:29

## 2021-09-19 RX ADMIN — HEPARIN SODIUM 5000 UNITS: 5000 INJECTION, SOLUTION INTRAVENOUS; SUBCUTANEOUS at 04:47

## 2021-09-19 RX ADMIN — HEPARIN SODIUM 5000 UNITS: 5000 INJECTION, SOLUTION INTRAVENOUS; SUBCUTANEOUS at 19:30

## 2021-09-19 RX ADMIN — HEPARIN SODIUM 5000 UNITS: 5000 INJECTION, SOLUTION INTRAVENOUS; SUBCUTANEOUS at 12:23

## 2021-09-19 RX ADMIN — LIDOCAINE 2 PATCH: 560 PATCH PERCUTANEOUS; TOPICAL; TRANSDERMAL at 08:34

## 2021-09-19 RX ADMIN — ATOMOXETINE 40 MG: 10 CAPSULE ORAL at 08:32

## 2021-09-19 ASSESSMENT — ACTIVITIES OF DAILY LIVING (ADL)
ADLS_ACUITY_SCORE: 12

## 2021-09-19 ASSESSMENT — VISUAL ACUITY
OU: NORMAL ACUITY
OU: NORMAL ACUITY

## 2021-09-19 ASSESSMENT — MIFFLIN-ST. JEOR
SCORE: 1682.5
SCORE: 1677.97

## 2021-09-19 NOTE — PLAN OF CARE
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  Vitals: VSS on RA   Neuros: A&O x 4. cog delay. R eye dysconjugate, baseline.   IV: PIV SL   Diet: Regular. good PO intake  Bowel status: BS+, LBM 9/18 x2. BM meds held.   : Voiding spontaneously to toilet.   Skin: Posterior head incision, sutures, SARAHI.   Pain: minimal HA this evening. Flexeril and ibuprofen utilized.   Activity: A1 w/ GB and walker. walked in halls.   Plan: Medically ready for discharge. Potential discharge pending outpatient therapies.

## 2021-09-19 NOTE — PLAN OF CARE
Dynamic Gait Index (DGI):The DGI is a measure of balance during gait that is reliable and valid for the elderly and individuals with Parkinson's disease, MS, vestibular disorders, or s/p stroke. Gait assistive device used: None    Patient score: 20/24  Scores ?19/24 indicate an increased risk for falls according to Melissa et al 2000  Minimal Detectable Change = 2.9 in community dwelling elderly according to Parmjit et al 2011    Assessment (rationale for performing, application to patient s function & care plan): Completed to assess dynamic balance and risk for falls for safe discharge planning.  Minutes billed as physical performance test: 10

## 2021-09-19 NOTE — PROGRESS NOTES
09/19/21 0925   Signing Clinician's Name / Credentials   Signing clinician's name / credentials Smita Wallace SPT   Dynamic Gait Index (Laurent and Schmidt Hackberry, 1995)   Gait Level Surface 3   Change in Gait Speed 3   Gait and Horizontal Head Turns 3   Gait with Vertical Head Turns 3   Gait and Pivot Turns 2   Step Over Obstacle 2   Step Around Obstacles 2   Steps 2   Total Dynamic Gait Index Score  (A score of 19 or less has been correlated to an increased risk of falls in community dwelling older adults, patients with vestibular disorders, and patients with MS.)   Total Score (out of 24) 20       See note below for DGI interpretation.

## 2021-09-19 NOTE — PROGRESS NOTES
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 Has  shunt set to baseline setting of 1.5.    Plan:  - Serial neuro exams  - Pain control  - Decadron 8 day taper  - Advance diet as tolerated  - Bowel regimen  - PRN antiemetics  - PT/OT: dispo to rehab  - SCDs for DVT proph  - Medically ready for discharge    -----------------------------------  Varsha Wagner MD  Neurosurgery Resident    Please contact neurosurgery resident on call with questions.    Dial * * *[phone], enter [phone] when prompted.   -----------------------------------    Interval History: SRINIVASA    Objective:   Temp:  [96.4  F (35.8  C)-98  F (36.7  C)] 96.4  F (35.8  C)  Pulse:  [68-97] 68  Resp:  [16] 16  BP: (105-121)/(54-78) 106/54  SpO2:  [92 %-99 %] 99 %  No intake/output data recorded.    Gen: Appears comfortable, NAD  Wound: clean, dry, intact  Neurologic:  - Alert & Oriented to person, place  - Follows commands  - No gaze preference. No apparent hemineglect.  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 (pre-op)  - Face symmetric     Del Tr Bi WE WF Gr   R 5 5 5 5 5 5   L 5 5 5 5 5 5    HF KE KF DF PF EHL   R 5 5 5 5 5 5   L 5 5 5 5 5 5     Sensation intact and symmetric to light touch throughout    LABS:  Recent Labs   Lab 09/19/21  0726 09/18/21  0745 09/17/21  0715    136 138   POTASSIUM 4.2 4.0 4.1   CHLORIDE 107 107 108   CO2 23 23 25   ANIONGAP 6 6 5   GLC 96 98 90   BUN 16 16 17   CR 0.84 0.76 0.79   ALEXA 8.7 8.8 8.6       Recent Labs   Lab 09/19/21  0726   WBC 9.7   RBC 4.01   HGB 11.8   HCT 34.3*   MCV 86   MCH 29.4   MCHC 34.4   RDW 12.4          IMAGING:  No results found for this or any previous visit (from the past 24 hour(s)).  I have seen the patient with the resident  and have discussed and agree with this note.  AMP

## 2021-09-19 NOTE — PLAN OF CARE
Status: Pt s/p redo supracerebellar infratentorial approach for tumor with endoscopic assistance on 9/10  Vitals: VSS on RA  Neuros: AO4, 5/5 strengths throughout. R eye dysconjugate, baseline cog delay  IV: PIV SL  Labs/Electrolytes: Redraws scheduled for tomorrow  Resp/trach: LS clear throughout  Diet: Regular diet, tolerating well  Bowel status: Reports BM this AM  : Voiding spontaneously   Skin: Posterior head incision SARAHI with sutures, bruising to abd  Pain: Well controlled with Flexeril, no other PRNs needed  Activity: Up with assist of 1, GB and walker. Ambulated halls x 2  Plan: Therapies recommending home with assist vs TCU. Continue to monitor and follow POC

## 2021-09-19 NOTE — PLAN OF CARE
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  Vitals: VSS on RA   Neuros: AO x 4. R eye dysconjugate, baseline. Cognitive delay at baseline.  IV: PIV SL   Labs/Electrolytes: WNL   Resp/trach: LS clear, no SOB reported.   Diet: Regular.   Bowel status: BS+, no BM this shift. Last BM 9/18.   : Voiding spontaneously via toilet.   Skin: Posterior head incision, sutures, SARAHI.   Pain: Pt intermittently complaining of HA, scheduled medications given. PRN tylenol given x1.  Activity: A1 w/ GB and walker.   Social: Sleeping in between cares.   Plan: Per NSG, medically ready for discharge. SW working with pt and family to find placement. Continue to monitor and follow POC.

## 2021-09-20 ENCOUNTER — APPOINTMENT (OUTPATIENT)
Dept: OCCUPATIONAL THERAPY | Facility: CLINIC | Age: 31
DRG: 025 | End: 2021-09-20
Attending: NEUROLOGICAL SURGERY
Payer: MEDICARE

## 2021-09-20 ENCOUNTER — APPOINTMENT (OUTPATIENT)
Dept: PHYSICAL THERAPY | Facility: CLINIC | Age: 31
DRG: 025 | End: 2021-09-20
Attending: NEUROLOGICAL SURGERY
Payer: MEDICARE

## 2021-09-20 VITALS
HEART RATE: 99 BPM | HEIGHT: 67 IN | WEIGHT: 205 LBS | TEMPERATURE: 96.5 F | BODY MASS INDEX: 32.18 KG/M2 | RESPIRATION RATE: 16 BRPM | OXYGEN SATURATION: 98 % | SYSTOLIC BLOOD PRESSURE: 127 MMHG | DIASTOLIC BLOOD PRESSURE: 53 MMHG

## 2021-09-20 LAB
ANION GAP SERPL CALCULATED.3IONS-SCNC: 4 MMOL/L (ref 3–14)
BUN SERPL-MCNC: 16 MG/DL (ref 7–30)
CALCIUM SERPL-MCNC: 8.9 MG/DL (ref 8.5–10.1)
CHLORIDE BLD-SCNC: 108 MMOL/L (ref 94–109)
CO2 SERPL-SCNC: 24 MMOL/L (ref 20–32)
CREAT SERPL-MCNC: 0.9 MG/DL (ref 0.52–1.04)
ERYTHROCYTE [DISTWIDTH] IN BLOOD BY AUTOMATED COUNT: 12.7 % (ref 10–15)
GFR SERPL CREATININE-BSD FRML MDRD: 86 ML/MIN/1.73M2
GLUCOSE BLD-MCNC: 93 MG/DL (ref 70–99)
HCT VFR BLD AUTO: 37.6 % (ref 35–47)
HGB BLD-MCNC: 12.8 G/DL (ref 11.7–15.7)
MAGNESIUM SERPL-MCNC: 2.2 MG/DL (ref 1.6–2.3)
MCH RBC QN AUTO: 29.8 PG (ref 26.5–33)
MCHC RBC AUTO-ENTMCNC: 34 G/DL (ref 31.5–36.5)
MCV RBC AUTO: 87 FL (ref 78–100)
PHOSPHATE SERPL-MCNC: 3.8 MG/DL (ref 2.5–4.5)
PLATELET # BLD AUTO: 276 10E3/UL (ref 150–450)
POTASSIUM BLD-SCNC: 4.3 MMOL/L (ref 3.4–5.3)
RBC # BLD AUTO: 4.3 10E6/UL (ref 3.8–5.2)
SODIUM SERPL-SCNC: 136 MMOL/L (ref 133–144)
WBC # BLD AUTO: 10.4 10E3/UL (ref 4–11)

## 2021-09-20 PROCEDURE — 84100 ASSAY OF PHOSPHORUS: CPT | Performed by: STUDENT IN AN ORGANIZED HEALTH CARE EDUCATION/TRAINING PROGRAM

## 2021-09-20 PROCEDURE — 250N000011 HC RX IP 250 OP 636: Performed by: STUDENT IN AN ORGANIZED HEALTH CARE EDUCATION/TRAINING PROGRAM

## 2021-09-20 PROCEDURE — 85027 COMPLETE CBC AUTOMATED: CPT | Performed by: STUDENT IN AN ORGANIZED HEALTH CARE EDUCATION/TRAINING PROGRAM

## 2021-09-20 PROCEDURE — 250N000013 HC RX MED GY IP 250 OP 250 PS 637: Performed by: STUDENT IN AN ORGANIZED HEALTH CARE EDUCATION/TRAINING PROGRAM

## 2021-09-20 PROCEDURE — 36415 COLL VENOUS BLD VENIPUNCTURE: CPT | Performed by: STUDENT IN AN ORGANIZED HEALTH CARE EDUCATION/TRAINING PROGRAM

## 2021-09-20 PROCEDURE — 97535 SELF CARE MNGMENT TRAINING: CPT | Mod: GO

## 2021-09-20 PROCEDURE — 97110 THERAPEUTIC EXERCISES: CPT | Mod: GO

## 2021-09-20 PROCEDURE — 97530 THERAPEUTIC ACTIVITIES: CPT | Mod: GO

## 2021-09-20 PROCEDURE — 82565 ASSAY OF CREATININE: CPT | Performed by: STUDENT IN AN ORGANIZED HEALTH CARE EDUCATION/TRAINING PROGRAM

## 2021-09-20 PROCEDURE — 97116 GAIT TRAINING THERAPY: CPT | Mod: GP

## 2021-09-20 PROCEDURE — 250N000013 HC RX MED GY IP 250 OP 250 PS 637: Performed by: NURSE PRACTITIONER

## 2021-09-20 PROCEDURE — 83735 ASSAY OF MAGNESIUM: CPT | Performed by: STUDENT IN AN ORGANIZED HEALTH CARE EDUCATION/TRAINING PROGRAM

## 2021-09-20 PROCEDURE — 97530 THERAPEUTIC ACTIVITIES: CPT | Mod: GP

## 2021-09-20 RX ADMIN — LIDOCAINE 2 PATCH: 560 PATCH PERCUTANEOUS; TOPICAL; TRANSDERMAL at 08:29

## 2021-09-20 RX ADMIN — HEPARIN SODIUM 5000 UNITS: 5000 INJECTION, SOLUTION INTRAVENOUS; SUBCUTANEOUS at 04:38

## 2021-09-20 RX ADMIN — ACETAMINOPHEN 650 MG: 325 TABLET, FILM COATED ORAL at 00:39

## 2021-09-20 RX ADMIN — CYCLOBENZAPRINE 10 MG: 10 TABLET, FILM COATED ORAL at 19:45

## 2021-09-20 RX ADMIN — CYCLOBENZAPRINE 10 MG: 10 TABLET, FILM COATED ORAL at 13:09

## 2021-09-20 RX ADMIN — ATOMOXETINE 40 MG: 10 CAPSULE ORAL at 08:21

## 2021-09-20 RX ADMIN — IBUPROFEN 600 MG: 600 TABLET, FILM COATED ORAL at 16:29

## 2021-09-20 RX ADMIN — CYCLOBENZAPRINE 10 MG: 10 TABLET, FILM COATED ORAL at 08:21

## 2021-09-20 RX ADMIN — HEPARIN SODIUM 5000 UNITS: 5000 INJECTION, SOLUTION INTRAVENOUS; SUBCUTANEOUS at 13:10

## 2021-09-20 ASSESSMENT — ACTIVITIES OF DAILY LIVING (ADL)
ADLS_ACUITY_SCORE: 12
ADLS_ACUITY_SCORE: 11
ADLS_ACUITY_SCORE: 12
ADLS_ACUITY_SCORE: 12

## 2021-09-20 NOTE — PROGRESS NOTES
Care Management Follow Up    Length of Stay (days): 10    Expected Discharge Date: 09/20/2021     Concerns to be Addressed: discharge planning     Patient plan of care discussed at interdisciplinary rounds: Yes    Anticipated Discharge Disposition:  Home      Anticipated Discharge Services:  Skilled home care  Anticipated Discharge DME:      Patient/family educated on Medicare website which has current facility and service quality ratings: yes   Education Provided on the Discharge Plan:  Yes  Patient/Family in Agreement with the Plan:  Yes    Referrals Placed by CM/SW:  [place] [facility]  Private pay costs discussed: Not applicable    Additional Information:  In 6A Discharge Rounds it was reported pt is ready for discharge today. Home PT & OT recommended.   Introduced myself to pt and explained I help with discharge planning. Pt awake and alert, was a little emotional. Pt said she would go home if she had 24 hour care, if not she would go to [place] (she meant a TCU). Informed pt I would not be able to arrange 24 hour care for her at home. Inquired about family. Pt said her mother works a couple days per week. Pt OK with me calling her mother.    Called pt's mother,Adelaida. She was OK with pt discharging home as long as pt can get herself to the bathroom. Adelaida said she lives 5 blocks away from pt. Mother works overnight 2 days per week. Prior to admission pt was independent except for driving.   Joni Davis, OT, she reported pt was able to manage OK in the bathroom, supervised her bathing; can dress herself. Up with a walker. Pt does not need 24 hour supervision. Home OT recommended.   Pt has PCA services, 14 hours per week. Has a [facility].     Met with pt again. She was calmer than when I saw her earlier today. Explained I spoke with therapy and they said she did well and is OK to discharge home, they recommend home PT & OT. Informed pt I spoke with her mother. Pt was in agreement with discharge  home; looking forward to being home among her own things.   Called Adelaida, Mother and informed her pt is able to get herself to the bathroom; is OK for discharge home per therapy. Adelaida will provide pt with transport home today.     Spoke with Loli at [facility]. She said they can start care tomorrow. Faxed AVS/Discharge Orders.       Sima Noriega RN Care Coordinator  [address], [facility]  Phone:  [phone]  Fax:  [phone]    Vivian Farris  Phone: [phone]

## 2021-09-20 NOTE — PLAN OF CARE
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  Vitals: VSS on RA ex one soft BP, MD notified. Retaken 45 minutes later and WNL.   Neuros: AO x 4. R eye dysconjugate, baseline. Cognitive delay at baseline. Strengths 5/5.  IV: PIV SL   Labs/Electrolytes: WNL   Resp/trach: LS clear, no SOB reported.   Diet: Regular.   Bowel status: BS+, no BM this shift. Last BM 9/19.   : Voiding spontaneously via toilet.   Skin: Posterior head incision, sutures, SARAHI.   Pain: Pt intermittently complaining of HA, controlled with scheduled medications.   Activity: A1 w/ GB and walker.   Social: Sleeping in between cares. Per Patient request, all four side rails up overnight.  Plan: Continue to monitor and follow POC. Therapies recommending home with assist vs TCU. Medically ready for discharge per MD notes.

## 2021-09-20 NOTE — PROGRESS NOTES
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 Has  shunt set to baseline setting of 1.5.    Plan:  - Serial neuro exams  - Pain control  - Decadron 8 day taper  - Advance diet as tolerated  - Bowel regimen  - PRN antiemetics  - PT/OT: dispo to rehab  - SCDs for DVT proph  - Medically ready for discharge    -----------------------------------  Marine Leblanc MD  Neurosurgery Resident    Please contact neurosurgery resident on call with questions.    Dial * * *[phone], enter [phone] when prompted.   -----------------------------------    Interval History: SRINIVASA    Objective:   Temp:  [96.4  F (35.8  C)-97.8  F (36.6  C)] 96.9  F (36.1  C)  Pulse:  [68-90] 79  Resp:  [16] 16  BP: (102-109)/(54-70) 102/60  SpO2:  [92 %-99 %] 97 %  No intake/output data recorded.    Gen: Appears comfortable, NAD  Wound: clean, dry, intact  Neurologic:  - Alert & Oriented to person, place  - Follows commands  - No gaze preference. No apparent hemineglect.  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 (pre-op)  - Face symmetric     Del Tr Bi WE WF Gr   R 5 5 5 5 5 5   L 5 5 5 5 5 5    HF KE KF DF PF EHL   R 5 5 5 5 5 5   L 5 5 5 5 5 5     Sensation intact and symmetric to light touch throughout    LABS:  Recent Labs   Lab 09/19/21  0726 09/18/21  0745 09/17/21  0715    136 138   POTASSIUM 4.2 4.0 4.1   CHLORIDE 107 107 108   CO2 23 23 25   ANIONGAP 6 6 5   GLC 96 98 90   BUN 16 16 17   CR 0.84 0.76 0.79   ALEXA 8.7 8.8 8.6       Recent Labs   Lab 09/19/21  0726   WBC 9.7   RBC 4.01   HGB 11.8   HCT 34.3*   MCV 86   MCH 29.4   MCHC 34.4   RDW 12.4          IMAGING:  No results found for this or any previous visit (from the past 24 hour(s)).

## 2021-09-20 NOTE — PLAN OF CARE
Status: S/p redo of supracerebellar infratentorial approach for tumor with endoscopic assistance.   Vitals: VSS on RA  Neuros: AOx4. R eye dysconjugate at baseline. Cognitive delay at baseline. Strengths 5/5.   IV: PIV SL  Labs/Electrolytes: WNL  Resp/trach: LS clear, no SOB reported  Diet: Regular, orders independently, good PO.   Bowel status: BS+, LBM 9/19, declined bowel meds today.   : Voiding, up to the toilet.  Skin: Posterior head incision, staples, open to air. Some bruising throughout.   Pain: 4/10 in back of head and neck. Lidocaine patches in place.   Activity: SBA with GB and walker. Walked around unit with PT.   Social: Spoke with family on personal phone.   Plan: Discharge around 5pm. Mother to  from 3 hours away.

## 2021-09-20 NOTE — PLAN OF CARE
Status: Pt s/p redo supracerebellar infratentorial approach for tumor with endoscopic assistance on 9/10.   Vitals: VSS on RA.   Neuros: A/O4, 5/5 strengths throughout. R eye dysconjugate, baseline cognitive delay.   IV: PIV SL.   Labs/Electrolytes: Redraws scheduled for tomorrow.  Resp/trach: LS clear throughout.   Diet: Regular diet, tolerating well.   Bowel status: Has had regular BMs and passing gas. Two BMs today.   : Voiding spontaneously .   Skin: Posterior head incision SARAHI with sutures, bruising to abd and upper extremities.   Pain: Well controlled with Flexeril,PRN tylenol administered with relief. Mild headache and neck pain.   Activity: Up with assist of 1, GB and walker. Had shower this evening.   Plan: Therapies recommending home with assist vs TCU. Continue to monitor and follow POC. Medically ready for discharge per MD notes.

## 2021-09-21 ENCOUNTER — TELEPHONE (OUTPATIENT)
Dept: NEUROSURGERY | Facility: CLINIC | Age: 31
End: 2021-09-21

## 2021-09-21 NOTE — PLAN OF CARE
Physical Therapy Discharge Summary    Reason for therapy discharge:    Discharged to home with home therapy.    Progress towards therapy goal(s). See goals on Care Plan in [facility] electronic health record for goal details.  Goals partially met.  Barriers to achieving goals:   discharge from facility.    Therapy recommendation(s):    Continued therapy is recommended.  Rationale/Recommendations:  Pt overall near baseline with functional mobility. Recommend home health PT to assess safe functional mobility and reduce risk of falls at home.

## 2021-09-21 NOTE — PROGRESS NOTES
Care plan and education completed. See discharge note, no acute changes other than complaint of headache prior to discharge, treated with ibuprofen. Gave HS medication flexeril only per pt request. Discharged to home with all belongings in stow.

## 2021-09-21 NOTE — PLAN OF CARE
All LDAs out. AVS discussed with mom and pt. Pt and mom verbalized understanding of discharge instructions with teach back. Pt assured staff that she didn't want meds as she had enough meds at home. Pt left with no meds. C/o HA, relieved with ibuprofen/tylenol. All belongings gathered by patient and mom, left with mom approx 2100.

## 2021-09-21 NOTE — PLAN OF CARE
Occupational Therapy Discharge Summary    Reason for therapy discharge:    Discharged to home with home therapy.    Progress towards therapy goal(s). See goals on Care Plan in [facility] electronic health record for goal details.  Goals partially met.  Barriers to achieving goals:   discharge from facility.    Therapy recommendation(s):    Continued therapy is recommended.  Rationale/Recommendations:  Recommend HHOT to promote increased ADL independence. Family assist with higher level IADLs.

## 2021-09-21 NOTE — TELEPHONE ENCOUNTER
[facility] Call Center    Phone Message    May a detailed message be left on voicemail: yes     Reason for Call: Patient discharged yesterday.  Wondering when she should get stiches taken out.  Please reach out to patient.    Action Taken: Message routed to:  [facility] Neurosurgery    Travel Screening: Not Applicable

## 2021-09-21 NOTE — TELEPHONE ENCOUNTER
Writer spoke with pt and confirmed that pt will be seen by clinic in [place] on 9/24 to have stiches removed. Pt will also have a virtual visit with samantha on 9/24.    Anay Middleton   81.6

## 2021-09-21 NOTE — TELEPHONE ENCOUNTER
Writer spoke with pt and confirmed that pt will be seen by clinic in [place] on 9/24 to have stiches removed. Pt will also have a virtual visit with samantha on 9/24.    Anay Middleton

## 2021-09-21 NOTE — TELEPHONE ENCOUNTER
[facility] Call Center    Phone Message    May a detailed message be left on voicemail: yes     Reason for Call: Patient has an in clinic appointment with Gloria Astorga on 9/24/21.  She is wondering if she can change this to a video visit.  Patient just discharged yesterday from hospital and isn't feeling 100%.  Please reach out to patient.    Action Taken: Message routed to:  [facility] Neurosurgery    Travel Screening: Not Applicable

## 2021-09-24 ENCOUNTER — VIRTUAL VISIT (OUTPATIENT)
Dept: NEUROSURGERY | Facility: CLINIC | Age: 31
End: 2021-09-24
Payer: MEDICARE

## 2021-09-24 DIAGNOSIS — D32.9 MENINGIOMA (H): Primary | ICD-10-CM

## 2021-09-24 PROCEDURE — 99024 POSTOP FOLLOW-UP VISIT: CPT | Performed by: PHYSICIAN ASSISTANT

## 2021-09-24 NOTE — PROGRESS NOTES
Mita Higgins is a 31 year old who is being evaluated via a billable video visit.      How would you like to obtain your AVS? [place]  If the video visit is dropped, the invitation should be resent by: [phone]        Originating Location (pt. Location): Home    Distant Location (provider location):  [facility] NEUROSURGERY [facility]     Platform used for Video Visit: [facility]    Video visit switched to telephone visit secondary to technical issues today:  Mita Higgins is a 31 year old who is being evaluated via a billable telephone visit.      What phone number would you like to be contacted at? [phone]  How would you like to obtain your AVS? 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 She underwent a redo supracerebellar infratentorial craniotomy for tumor resection on 9/10/2021 with Dr. Pinzon. She had an uncomplicated hospital stay and discharged home with home health therapies on 2021. She got her sutures removed today close to home and that went well. Her head pain is 3/10, intermittent, and controlled well with tylenol and ibuprofen. She denies new fever, chills, headaches, vision changes, seizures, paresthesias, or weakness. Overall she feels like she's making good progress. She's walking around the house without difficulty.    Review of Systems:   Pertinent items are noted in HPI or as in patient entered ROS below, remainder of complete ROS is negative.     Physical Exam:   Limited by video  visit.  Neuro: The patient is fully oriented. Speech is normal.     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 She was encouraged to reach out sooner with any new questions or concerns.      Gloria Astorga PA-C  Department of Neurosurgery

## 2021-09-24 NOTE — LETTER
2021       RE: Mita Higgins  [address]  [place] [address]     Dear Colleague,    Thank you for referring your patient, Mita Higgins, to the [facility] NEUROSURGERY CLINIC [place] at [facility]. Please see a copy of my visit note below.    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 She underwent a redo supracerebellar infratentorial craniotomy for tumor resection on 9/10/2021 with Dr. Pinzon. She had an uncomplicated hospital stay and discharged home with home health therapies on 2021. She got her sutures removed today close to home and that went well. Her head pain is 3/10, intermittent, and controlled well with tylenol and ibuprofen. She denies new fever, chills, headaches, vision changes, seizures, paresthesias, or weakness. Overall she feels like she's making good progress. She's walking around the house without difficulty.    Review of Systems:   Pertinent items are noted in HPI or as in patient entered ROS below, remainder of complete ROS is negative.     Physical Exam:   Limited by video visit.  Neuro: The patient is fully oriented. Speech is normal.     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 She was encouraged to reach out sooner with any new questions or concerns.      Gloria Astorga  APPLE  Department of Neurosurgery        Again, thank you for allowing me to participate in the care of your patient.      Sincerely,    Gloria Astorga PA-C

## 2021-10-02 ENCOUNTER — HEALTH MAINTENANCE LETTER (OUTPATIENT)
Age: 31
End: 2021-10-02

## 2021-10-11 ENCOUNTER — TELEPHONE (OUTPATIENT)
Dept: NEUROSURGERY | Facility: CLINIC | Age: 31
End: 2021-10-11

## 2021-12-28 ENCOUNTER — OFFICE VISIT (OUTPATIENT)
Dept: NEUROSURGERY | Facility: CLINIC | Age: 31
End: 2021-12-28
Payer: MEDICARE

## 2021-12-28 ENCOUNTER — ANCILLARY PROCEDURE (OUTPATIENT)
Dept: MRI IMAGING | Facility: CLINIC | Age: 31
End: 2021-12-28
Attending: PHYSICIAN ASSISTANT
Payer: MEDICARE

## 2021-12-28 DIAGNOSIS — Z98.2 VP (VENTRICULOPERITONEAL) SHUNT STATUS: Primary | ICD-10-CM

## 2021-12-28 DIAGNOSIS — D32.9 MENINGIOMA (H): ICD-10-CM

## 2021-12-28 PROCEDURE — 99207 PR NO BILLABLE SERVICE THIS VISIT: CPT | Performed by: NURSE PRACTITIONER

## 2021-12-28 PROCEDURE — 70553 MRI BRAIN STEM W/O & W/DYE: CPT | Mod: GC | Performed by: RADIOLOGY

## 2021-12-28 PROCEDURE — A9585 GADOBUTROL INJECTION: HCPCS | Performed by: RADIOLOGY

## 2021-12-28 RX ORDER — GADOBUTROL 604.72 MG/ML
10 INJECTION INTRAVENOUS ONCE
Status: COMPLETED | OUTPATIENT
Start: 2021-12-28 | End: 2021-12-28

## 2021-12-28 RX ADMIN — GADOBUTROL 9 ML: 604.72 INJECTION INTRAVENOUS at 15:16

## 2021-12-28 NOTE — LETTER
12/28/2021       RE: Mita Higgins  [address]  [place] [address]     Dear Colleague,    Thank you for referring your patient, Mita Higgins, to the [facility] NEUROSURGERY CLINIC [place] at [facility]. Please see a copy of my visit note below.    [facility] Department of Neurosurgery      12/28/2021  Mita Higgins  31 year old    Procedure explained, questions answered.  Using the BalaBit , shunt valve was interrogated and found to be at 1.5. This was confirmed x 3.  Pt tolerated procedure well.       Meme Juan, CNP  Department of Neurosurgery

## 2021-12-28 NOTE — PROGRESS NOTES
[facility] Department of Neurosurgery      12/28/2021  Mita Higgins  31 year old    Procedure explained, questions answered.  Using the TagLabs , shunt valve was interrogated and found to be at 1.5. This was confirmed x 3.  Pt tolerated procedure well.       Meme Juan, CNP  Department of Neurosurgery

## 2021-12-28 NOTE — DISCHARGE INSTRUCTIONS
MRI Contrast Discharge Instructions    The IV contrast you received today will pass out of your body in your  urine. This will happen in the next 24 hours. You will not feel this process.  Your urine will not change color.    Drink at least 4 extra glasses of water or juice today (unless your doctor  has restricted your fluids). This reduces the stress on your kidneys.  You may take your regular medicines.    If you are on dialysis: It is best to have dialysis today.    If you have a reaction: Most reactions happen right away. If you have  any new symptoms after leaving the hospital (such as hives or swelling),  call your hospital at the correct number below. Or call your family doctor.  If you have breathing distress or wheezing, call 911.    Special instructions: ***    I have read and understand the above information.    Signature:______________________________________ Date:___________    Staff:__________________________________________ Date:___________     Time:__________    [place] [facility] Departments:    ___[place]: [phone]  ___[facility]: [phone]  ___[place]: [phone] ___[facility]: [phone]  ___[facility]: [phone]  ___[facility]: [phone]  ___[phone]  ___[facility]: [phone]  ___[phone]

## 2022-01-05 ENCOUNTER — VIRTUAL VISIT (OUTPATIENT)
Dept: NEUROSURGERY | Facility: CLINIC | Age: 32
End: 2022-01-05
Payer: COMMERCIAL

## 2022-01-05 DIAGNOSIS — D32.9 MENINGIOMA (H): Primary | ICD-10-CM

## 2022-01-05 PROCEDURE — 99214 OFFICE O/P EST MOD 30 MIN: CPT | Mod: 95 | Performed by: NEUROLOGICAL SURGERY

## 2022-01-05 NOTE — LETTER
[place] FOR SK[facility] BASE [facility] PITUITARY SURGERY  [facility] NEUROSURGERY CLINIC [address]  [address]LO[address]  [facility] [address]  Phone: [phone]  Fax: [phone]          2022    RE:   Mita Higgins  [address]  [place] [address]      Dear Colleague,    Thank you for referring your patient, Mita Higgins, to the [facility]. Please see a copy of my visit note below.        [facility]      Mita Higgins   31 year old female who is being evaluated via a video visit   : 1990  Referring provider: Dr. Barrett  2022      Reason for visit: bilateral petroclival meningioma s/p redo supracerebellar infratentorial craniotomy for tumor resection 9/10/21 , follow up visit    Dear Dr. Barrett,     It was a pleasure to see Ms. Higgins in Skull Base Neurosurgery clinic today regarding her extensive meningioma history in followup. As you know, Ms. Higgins is a 30 year old left-handed female who was diagnosed with a large bilateral petroclival meningioma after presenting with headaches. She initially underwent a right pterional craniotomy with Dr. Smith in  for biopsy, which revealed WHO grade 1 meningioma. She then was found with obstructive hydrocephalus and underwent a left frontal  shunt placement (Strata 1.5) with Dr. Jaffe in . Then later in , she underwent a right paramedian supracerebellar, infratentorial approach by Dr. Edwards and Dr. Anderson for debulking. The fourth nerve was cut during this operation in . She then recently underwent a redo torcular craniotomy for resection with me on 9/10/2021. She returns for scheduled followup.    Here she relays to me that she has been feeling well since surgery without any headaches, balance issues, or new visual field defects.      Review of Systems:   Pertinent items are noted in HPI, remainder of complete ROS is negative.       Physical Exam by Video:   Patient well appearing.   Fluent speech.   Stable dysconjugate gaze.  Face symmetric.  Surgical area appears well.     Imaging:  We reviewed her recent MRI  and compared it to the prior MRI from last year. This demonstrates good debulking of right perimesencephalic meningioma component, the target of her recent surgery.    Assessment:  1. bilateral petroclival meningioma s/p two surgeries (Jerry Anderson 2010, 2012)   2. Obstructive hydrocephalus s/p left frontal  shunt (Strata 1.5; Dr. Jaffe; 4/2012)  3. Fourth nerve palsy  4. Right frontal convexity meningioma, resected 9/10/21  5. 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 Bilateral hip osteonecrosis s/p ADDY  7. Headaches, followed by Neurology Headache Clinic    Plan:   1. We are pleased to see the results of her recent MRI. We will plan to see her back in 1 year with updated MRI.  2. I encouraged her to contact us should any questions or concerns arise in advance of her next appointment      It has been a pleasure to participate in the care of your patient. Please feel free to contact us if we may be of any assistance for Ms. Higgins.    Visit:  Video started at 1226  Video ended at 1233    Kris Pinzon MD   Department of Neurosurgery  [facility]  [facility]         Scribe Disclosure:  I, Spencer Bernabe, am serving as a scribe to document services personally performed by Kris Pinzon MD at this visit, based upon the provider's statements to me. All documentation has been reviewed by the aforementioned provider prior to being entered into the official medical record.          Patient statement: cannot visit in person due to Covid19    Physician has received verbal consent for a Video Visit from the patient? Yes    Patient would like the video invitation sent by: Am Well    Originating Location (pt. Location): Home    Distant Location (provider location):  [facility]  "[place] NEUROSURGERY [facility]     Mode of Communication:  Video Conference via [employer]ra[employer]      Mita STONE Ronaldo is a 31 year old female who is being evaluated via a billable video visit.      The patient has been notified of following:     \"This video visit will be conducted via a call between you and your physician/provider. We have found that certain health care needs can be provided without the need for an in-person physical exam.  This service lets us provide the care you need with a video conversation.  If a prescription is necessary we can send it directly to your pharmacy.  If lab work is needed we can place an order for that and you can then stop by our lab to have the test done at a later time.    If during the course of the call the physician/provider feels a video visit is not appropriate, you will not be charged for this service.\"         Again, thank you for allowing me to participate in the care of your patient.      Sincerely,    Kris Pinzon MD      "

## 2022-01-05 NOTE — PROGRESS NOTES
Mita Higgins is a 31 year old who is being evaluated via a billable video visit.      How would you like to obtain your AVS? MyChart  If the video visit is dropped, the invitation should be resent by: Send to e-mail at: [email or web address]  Will anyone else be joining your video visit? No  {If patient encounters technical issues they should call [phone] :[id number]}    Video Start Time: {video visit start/end time for provider to select:850276}  Video-Visit Details    Type of service:  Video Visit    Video End Time:{video visit start/end time for provider to select:187818}    Originating Location (pt. Location): Home    Distant Location (provider location):  [facility] NEUROSURGERY [facility]     Platform used for Video Visit: [employer]

## 2022-01-05 NOTE — PATIENT INSTRUCTIONS
Thank you for choosing [facility]. You were seen in the Neurosurgery Clinic today with Dr. Pinzon.    Next steps:  1. Return to clinic in 1 year with Dr. Pinzon.  2. MRI needed prior to appointment.    Please don't hesitate to reach out via MyChart or telephone if you have any questions after your visit.    Amy Flores RN Care Coordinator, Neurosurgery    Direct: [phone]  Neurosurgery Clinic: [phone]

## 2022-01-05 NOTE — Clinical Note
2022       RE: Mita Higgins  [address]  [place] [address]     Dear Colleague,    Thank you for referring your patient, Mita Higgins, to the [facility] NEUROSURGERY CLINIC [place] at [facility]. Please see a copy of my visit note below.      [facility]      Mita Higgins   31 year old female who is being evaluated via a video visit   : 1990  Referring provider: Dr. Barrett  2022      Reason for visit: bilateral petroclival meningioma s/p redo supracerebellar infratentorial craniotomy for tumor resection 9/10/21 , follow up visit    Dear Dr. Barrett,     It was a pleasure to see Ms. Higgins in Skull Base Neurosurgery clinic today regarding her extensive meningioma history as a new patient. As you know, Ms. Higgins is a 30 year old left-handed female who was diagnosed with a large bilateral petroclival meningioma after presenting with headaches. She is here today with her sister. She initially underwent a right pterional craniotomy with Dr. Smith in  for biopsy, which revealed WHO grade 1 meningioma. She then was found with obstructive hydrocephalus and underwent a left frontal  shunt placement (Strata 1.5) with Dr. Jaffe in . Then later in , she underwent a right paramedian supracerebellar, infratentorial approach by Dr. Edwards and Dr. Anderson for debulking. The fourth nerve was cut during this operation in . She then recently underwent       Here she relays to me that she has been feeling well since surgery without any headaches, balance issues, or new visual field defects.      Review of Systems:   Pertinent items are noted in HPI, remainder of complete ROS is negative.      Physical Exam by Video:   Patient well appearing.   Fluent speech.   Stable dysconjugate gaze test.  Surgical area appears well.     Imaging:  We reviewed her recent MRI  and compared it to the prior  MRI from last year. This demonstrates good debulking of right perimesencephalic meningioma component.     Assessment:  1. 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 Obstructive hydrocephalus s/p left frontal  shunt (Strata 1.5; Dr. Jaffe; 4/2012)  3. Right frontal convexity meningioma, resected 9/10/21  4. 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 Bilateral hip osteonecrosis s/p ADDY  6. Headaches, followed by Neurology Headache Clinic    Plan:   1. With well appearing MRI and good post surgical course, we can follow with her in an year for repeat MRI.   2. I encouraged her to contact us should any questions or concerns arise in advance of her next appointment      It has been a pleasure to participate in the care of your patient. Please feel free to contact us if we may be of any assistance for Ms. Higgins.    Visit:  Video started at 1226  Video ended at 1233    Kris Pinzon MD   Department of Neurosurgery  [facility]  [facility]         Scribe Disclosure:  I, Spencer Bernabe, am serving as a scribe to document services personally performed by Kris Pinzon MD at this visit, based upon the provider's statements to me. All documentation has been reviewed by the aforementioned provider prior to being entered into the official medical record.          Patient statement: cannot visit in person due to Covid19    Physician has received verbal consent for a Video Visit from the patient? Yes    Patient would like the video invitation sent by: Am Well    Originating Location (pt. Location): Home    Distant Location (provider location):  [facility] NEUROSURGERY CLINIC [place]     Mode of Communication:  Video Conference via Gavin STONE Ronaldo is a 31 year old female who is being evaluated via a billable video visit.      The patient has been notified of following:  "    \"This video visit will be conducted via a call between you and your physician/provider. We have found that certain health care needs can be provided without the need for an in-person physical exam.  This service lets us provide the care you need with a video conversation.  If a prescription is necessary we can send it directly to your pharmacy.  If lab work is needed we can place an order for that and you can then stop by our lab to have the test done at a later time.    If during the course of the call the physician/provider feels a video visit is not appropriate, you will not be charged for this service.\"        Mita Higgins is a 31 year old who is being evaluated via a billable video visit.      How would you like to obtain your AVS? MyChart  If the video visit is dropped, the invitation should be resent by: Send to e-mail at: [email or web address]  Will anyone else be joining your video visit? No  {If patient encounters technical issues they should call [phone] :[id number]}    Video Start Time: {video visit start/end time for provider to select:733904}  Video-Visit Details    Type of service:  Video Visit    Video End Time:{video visit start/end time for provider to select:248825}    Originating Location (pt. Location): Home    Distant Location (provider location):  [facility] NEUROSURGERY [facility]     Platform used for Video Visit: Thermedical        Again, thank you for allowing me to participate in the care of your patient.      Sincerely,    Kris Pinzon MD    "

## 2022-01-11 ENCOUNTER — PATIENT OUTREACH (OUTPATIENT)
Dept: CARE COORDINATION | Facility: CLINIC | Age: 32
End: 2022-01-11
Payer: COMMERCIAL

## 2022-01-11 NOTE — PROGRESS NOTES
Social Work Telephone Message Note  [facility]    Patient Name:  Mita Higgins  /Age:  1990 (31 year old)    Referral Source: Amy Flores RN  Reason for Referral:  transportation    Pt called neurosurgery clinic to review her appts and requesting information about transportation to appts. Her Mother who helped her passed away recently.   I left a message for Pt about [employer] transportation and my contact info to assist her as needed.     DELONTE Burt, [facility]    [facility]  [phone]

## 2022-04-26 ENCOUNTER — TELEPHONE (OUTPATIENT)
Dept: NEUROSURGERY | Facility: CLINIC | Age: 32
End: 2022-04-26
Payer: COMMERCIAL

## 2022-04-26 DIAGNOSIS — Z98.2 S/P VP SHUNT: Primary | ICD-10-CM

## 2022-04-26 DIAGNOSIS — D32.9 MENINGIOMA (H): ICD-10-CM

## 2022-04-26 NOTE — TELEPHONE ENCOUNTER
Received message from oncology team that patient called complaining of off and on headaches the past two weeks. Called patient to inquire more about symptoms. Left message with direct callback number.    Barb Bhat RN  RN Care Coordinator, Skull Base Surgery  [facility]  Direct: [phone]

## 2022-04-27 ENCOUNTER — TELEPHONE (OUTPATIENT)
Dept: NEUROSURGERY | Facility: CLINIC | Age: 32
End: 2022-04-27
Payer: COMMERCIAL

## 2022-04-27 NOTE — TELEPHONE ENCOUNTER
Called patient to discuss plan of care per Dr. Pinzon. She has been experiencing headaches that have been keeping her up at night. It was explained that the patient is recommended to complete a head CT to rule out any shunt malfunction. The patient is not agreeable to this plan. She states that she bumped her head 3x in the area of her shunt and believes that her setting has changed. She also reports that her shunt feels hot to the touch and she has been using ice to cool it down. She is requesting to have a MRI and to have her shunt setting reprogrammed.

## 2022-04-28 ENCOUNTER — TELEPHONE (OUTPATIENT)
Dept: NEUROSURGERY | Facility: CLINIC | Age: 32
End: 2022-04-28
Payer: COMMERCIAL

## 2022-04-28 NOTE — TELEPHONE ENCOUNTER
Talked with patient to discuss plan of care. She prefers to complete MRI at [facility] but first available appointment was 5/17/22. Patient would be willing to go to [facility] in [place] if she can get a sooner appointment. Will fax MRI order to [facility]. Writer will follow up with patient regarding next steps/shunt check and she is encouraged to call with any questions or concerns. Provided direct contact information.

## 2022-04-29 ENCOUNTER — HOSPITAL ENCOUNTER (OUTPATIENT)
Dept: MRI IMAGING | Facility: CLINIC | Age: 32
Discharge: HOME | End: 2022-04-29
Attending: PHYSICIAN ASSISTANT | Admitting: PHYSICIAN ASSISTANT
Payer: MEDICARE

## 2022-04-29 DIAGNOSIS — D42.9 MENINGIOMA, MULTIPLE (H): ICD-10-CM

## 2022-04-29 PROCEDURE — 255N000002 HC RX 255 OP 636: Performed by: PHYSICIAN ASSISTANT

## 2022-04-29 PROCEDURE — G1004 CDSM NDSC: HCPCS | Performed by: RADIOLOGY

## 2022-04-29 PROCEDURE — A9585 GADOBUTROL INJECTION: HCPCS | Performed by: PHYSICIAN ASSISTANT

## 2022-04-29 PROCEDURE — 70553 MRI BRAIN STEM W/O & W/DYE: CPT | Mod: 26 | Performed by: RADIOLOGY

## 2022-04-29 PROCEDURE — 70553 MRI BRAIN STEM W/O & W/DYE: CPT | Mod: MG

## 2022-04-29 RX ORDER — GADOBUTROL 604.72 MG/ML
9 INJECTION INTRAVENOUS ONCE
Status: COMPLETED | OUTPATIENT
Start: 2022-04-29 | End: 2022-04-29

## 2022-04-29 RX ADMIN — GADOBUTROL 9 ML: 604.72 INJECTION INTRAVENOUS at 14:14

## 2022-04-29 NOTE — PROCEDURES
NP at bedside to reprogram shunt following MRI.  Procedure explained, questions answered.  Using the Adaptive TCR , shunt valve was interrogated and found to be at 2.5.  Previous setting was 1.5.  Using the [employer] magnet, the shunt valve was dialed down to 1.5.  This was confirmed x 3.  Pt tolerated procedure well.

## 2022-05-02 ENCOUNTER — TELEPHONE (OUTPATIENT)
Dept: PEDIATRIC HEMATOLOGY/ONCOLOGY | Facility: CLINIC | Age: 32
End: 2022-05-02
Payer: COMMERCIAL

## 2022-05-02 NOTE — TELEPHONE ENCOUNTER
Called Mita to review MRI results and to see how she is doing.    MRI was stable and patient was able to get her shunt reprogrammed after.  Patient was very happy with results and that we fit her in.    We talked about staying hydrated, getting good sleep each night and using medication if needed for headaches.  She agreed with plan and will call with any concerns or issues.    INNA Heaton, RN  CNS Tumor Care Coordinator  Office: [phone]  E-mail: [email or web address]

## 2022-05-08 ENCOUNTER — HEALTH MAINTENANCE LETTER (OUTPATIENT)
Age: 32
End: 2022-05-08

## 2022-06-01 NOTE — LETTER
[facility] PEDS CNS TUMOR/NEUROFIBROMATOSIS      [facility]  9th Floor  [address] [place] [address]  Phone: [phone]     PHYSICIAN REFERRAL       DATE & TIME ISSUED: 2022 12:27 PM  PATIENT NAME: Mita Higgins   : 1990  [facility] MR#: [id number]  DX: Bilateral meningioma     Mita Higgins is moving to [place]  and needs to establish care with a neurosurgery and neuro oncology team due to yearly management of her meningiomas and her shunt.  We have included most recent notes.  Please reach out to patient to transfer/establish care and future follow up. Call INNA Heaton, RN CNS Tumor Care Coordinator Office: [phone], E-mail: [email or web address] with any questions or concerns.     Pt History   1. Bilateral petroclival meningioma s/p two surgeries (Jerry Anderson , )   2. Obstructive hydrocephalus s/p left frontal  shunt (Strata 1.5; Dr. Jaffe; 2012)  3. Fourth nerve palsy  4. Right frontal convexity meningioma, resected 9/10/21  5. 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 Bilateral hip osteonecrosis s/p ADDY  7. Headaches, followed by Neurology Headache Clinic         Pilo Barrett MD   Medical Director, Pediatric Neuro-oncology and Neurofibromatosis programs   Co-medical director, [facility] Pediatric Hematology Oncology

## 2022-06-01 NOTE — LETTER
[facility] PEDS CNS TUMOR/NEUROFIBROMATOSIS      [facility]  9th Floor  [address] [place] [address]  Phone: [phone]     PHYSICIAN REQUEST      DATE & TIME ISSUED: 2022 12:10 PM  PATIENT NAME: Mita Higgins   : 1990  [facility] MR#: [id number]  DX: Meningiomas / Intracranial Tumor       Our patient Mita Higgins has a shunt in place due to a bilateral petrosphenoclival meningioma. It is a Strata valve set at 2.0. We are requesting that the patient does not go through any metal detectors or machines at the airport.  Going through any security devices cause patient to have horrible headaches and have issues with her shunt.      If questions please call: INNA Heaton, RN      CNS Tumor Care Coordinator      Office: [phone]      E-mail: [email or web address]         Pilo Barrett MD   Medical Director, Pediatric Neuro-oncology and Neurofibromatosis programs   Co-medical director, [facility] Pediatric Hematology Oncology

## 2022-06-02 NOTE — PROGRESS NOTES
Talked with patient about upcoming move to [place]. This RN sent a transfer of care to [facility] where she previously received her radiation treatment.  Referral sent, and request to [facility] for medical records to be sent to them was done.    Also sent a letter to Mita for travel purposes. Neurosurgery stated that it is safe for her to go through airport security, but Mita complains that she gets headaches and her shunt is off when she does this.  Note was sent to Mita to prevent her from going through any security devices at the airport.

## 2022-06-13 ENCOUNTER — TELEPHONE (OUTPATIENT)
Dept: PEDIATRIC HEMATOLOGY/ONCOLOGY | Facility: CLINIC | Age: 32
End: 2022-06-13
Payer: COMMERCIAL

## 2022-06-13 NOTE — TELEPHONE ENCOUNTER
Called patient to make sure patient had arrived in [place] with no issues. Mita confirmed that she is currently living with her sister in [place] and things are well.    Informed her that all care has been transferred to [facility].  They have all current information.  Phone number to call if any issues arise is [phone].    Mita agrees with plan and will call if she has any further questions or concerns.    INNA Heaton, RN  CNS Tumor Care Coordinator  Office: [phone]  E-mail: [email or web address]

## 2023-01-14 ENCOUNTER — HEALTH MAINTENANCE LETTER (OUTPATIENT)
Age: 33
End: 2023-01-14

## 2023-03-28 NOTE — TELEPHONE ENCOUNTER
Patient called with an updated pharmacy He uses [facility] in [facility]. Updated in chart. Patient is calling stating that form needs to be re faxed [employer] for patient to get the form for the patient to send to  [facility] is the place that she will be sending her reimbursement form to. She is requesting that this paperwork be faxed to [phone] (not [phone])  no later than 1pm today.  Please call patient to provide status update. In order for patient to get paid they need the from.

## 2023-07-22 ENCOUNTER — HEALTH MAINTENANCE LETTER (OUTPATIENT)
Age: 33
End: 2023-07-22

## 2024-02-27 NOTE — LETTER
"4/21/2021       RE: Mita Higgins  [address] [place] [id number]     Dear Colleague,    Thank you for referring your patient, Mita Higgins, to the [facility] NEUROLOGY CLINIC [place] at [facility]. Please see a copy of my visit note below.    Mita is a 30 year old who is being evaluated via a billable video visit.      How would you like to obtain your AVS? MyChart  If the video visit is dropped, the invitation should be resent by: Send to e-mail at: [email or web address]  Will anyone else be joining your video visit? No      Video-Visit Details    Type of service:  Video Visit    Video Start Time: 1:45 PM  Video End Time:2:08 PM    Originating Location (pt. 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  It has been radiated a several years ago in [place].   Dr Barrett/Sakshi Rivas CNP on 12/4/2018 and brain MRI was stable, shunt reset on 12/4/2018      Today reports that Headaches depends on the stress level and sleep. If neighbor is loud-hard to sleep sometimes so she may get a headache.  Headache frequency -no too many this week.   Topiramate one tab 25 mg at bedtime and helps with headaches and helps with sleep, not interested in increasing dose .   Back in February -5 headaches and spaced out. Duration of headache a day and goes away. " Takes ibuprofen and helps.   Does not get more headaches. No changes in the headache medicine/treatment plan needed.   Reports that she would like people not to stress her out than she does not get too much headaches. Stressed not that often.   Reports that she goes for a walk and tries to take a deep breath.     Takes Strattera -if overheated and gets dizzy sometimes.     Headache treatment Plan discussed with the patient during today's visit:  Mindfulness and stress relieving modalities -relaxation techniques  Sleep routine   Stay hydrated  Headache prevention-topiramate 25 mg at bedtime and may increase it to 50 mg at bedtime if needed and tolerated.   Ibuprofen as needed with food. Limit use to no more than 14 days per month.   Follow up in 6-9 months if stable or sooner if needed for plan review and headache      Review of Systems   Constitutional, HEENT, cardiovascular, pulmonary, gi and gu systems are negative, except as otherwise noted.  Reports that menstrual cycle stopped and has been taking medroxyprogesterone and helped initially.     Allergies and Current meds reviewed and updated      Objective       Vitals:  No vitals were obtained today due to virtual visit.    Physical Exam   No headache today  GENERAL: Healthy, alert and no acute distress  EYES: Eyes grossly normal to inspection.  No discharge or erythema, or obvious scleral/conjunctival abnormalities.  RESP: No audible wheeze, cough, or visible cyanosis.  No visible retractions or increased work of breathing.    SKIN: Visible skin clear. No significant rash, abnormal pigmentation or lesions.  NEURO: symmetrical PSYCH: Mentation appears normal, affect normal/bright, judgement and insight intact, normal speech and appearance well-groomed.  Walks without difficulties in the room    I discussed all my recommendations with Mita Higgins who verbalizes understanding and comfortable with the plan.  All of patient's questions were answered from the best  of my knowledge.  Patient is in agreement with the plan.     32 minutes spent on the date of the encounter doing chart review, history and exam, documentation and further activities as noted above    LOUISE Pickett, CNP [facility]  Headache certified  [facility]             Female

## 2024-08-06 NOTE — PROGRESS NOTES
Discharge Diagnosis  Abdominal pain    Issues Requiring Follow-Up  Abdominal pain    Test Results Pending At Discharge  Pending Labs       Order Current Status    Stool Pathogen Panel, PCR Collected (08/06/24 1312)    C. difficile, PCR In process            Hospital Course   Patient arrived to [facility] yesterday afternoon for abdominal pain, nausea, vomiting, diarrhea after taking an increased dose of Ozempic. On arrival to ED, patient was found to have leukocytosis of 20.6 without evidence of acute pathology on CT A/P. Repeat CBC this AM showed normal WBC at 8.8. Patient observed to be tolerating full liquid diet. Will discharge patient on full liquids with protein shakes and patient should follow up with prescriber of Ozempic.     Pertinent Physical Exam At Time of Discharge  Physical Exam  Constitutional:       Appearance: Normal appearance.   HENT:      Head: Normocephalic and atraumatic.      Nose: Nose normal.      Mouth/Throat:      Mouth: Mucous membranes are moist.   Cardiovascular:      Rate and Rhythm: Normal rate.   Pulmonary:      Effort: Pulmonary effort is normal. No respiratory distress.   Abdominal:      General: There is no distension.      Palpations: Abdomen is soft.      Tenderness: There is no abdominal tenderness.   Musculoskeletal:         General: Normal range of motion.      Cervical back: Normal range of motion.   Skin:     General: Skin is warm and dry.   Neurological:      General: No focal deficit present.      Mental Status: She is alert and oriented to person, place, and time.   Psychiatric:         Mood and Affect: Mood normal.         Home Medications     Medication List      You have not been prescribed any medications.       Outpatient Follow-Up  No future appointments.    Fiorella Hoff PA-C   Mita Higgins is a 30 year old who is being evaluated via a billable video visit.      How would you like to obtain your AVS? MyChart  If the video visit is dropped, the invitation should be resent by: Send to e-mail at: [email or web address]  Will anyone else be joining your video visit? No      Video Start Time: 12:00p  Video-Visit Details    Type of service:  Video Visit    Video End Time:12:10p    Originating Location (pt. Location): Home    Distant Location (provider location):  [facility] NEUROSURGERY [facility]     Platform used for Video Visit: [employer]

## 2024-09-08 ENCOUNTER — HEALTH MAINTENANCE LETTER (OUTPATIENT)
Age: 34
End: 2024-09-08

## (undated) DEVICE — LINEN TOWEL PACK X6 WHITE 5487

## (undated) DEVICE — Device

## (undated) DEVICE — SYR 01ML 27GA 0.5" NDL TBC 309623

## (undated) DEVICE — CUSA 36KHZ TIP CVD EXT MICROTIP CLARITY C74115

## (undated) DEVICE — MARKER SPHERES PASSIVE MEDT PACK 5 8801075

## (undated) DEVICE — BUR STRK CARBIDE MATCH HEAD 3.0MM 5820-107-530C

## (undated) DEVICE — DRAPE POUCH IRR 1016

## (undated) DEVICE — WIPES FOLEY CARE SURESTEP PROVON DFC100

## (undated) DEVICE — DRAPE POUCH INSTRUMENT 1018

## (undated) DEVICE — SU NUROLON 4-0 TF CR 8X18" C584D

## (undated) DEVICE — DRAPE CRANIOTOMY W/POUCH 9450

## (undated) DEVICE — SU ETHILON 3-0 PS-1 18" 1663H

## (undated) DEVICE — DRAPE IOBAN INCISE 13X13" 6640EZ

## (undated) DEVICE — PREP CHLORAPREP CLEAR 3ML 260400

## (undated) DEVICE — SOL RINGERS LACTATED 1000ML BAG 07953-09

## (undated) DEVICE — RX BACITRACIN OINTMENT 0.9G 1/32OZ CUR001109

## (undated) DEVICE — SPONGE COTTONOID 1/2X3" 20-07S

## (undated) DEVICE — PACK CRANIOTOMY

## (undated) DEVICE — ESU ELEC BLADE 2.75" COATED/INSULATED E1455

## (undated) DEVICE — DRAPE MICROSCOPE LEICA 54X120" 09-MK653

## (undated) DEVICE — SOL WATER IRRIG 1000ML BOTTLE 2F7114

## (undated) DEVICE — BUR STRK 1.1MM STRAIGHT ROUTER 5820-070-011

## (undated) DEVICE — CUSA TUBE QUICK CONNECT CLARITY C7300

## (undated) DEVICE — NDL ANGIOCATH 14GA 1.25" 4048

## (undated) DEVICE — ESU CORD BIPOLAR AND IRR TUBING AESCULAP US355

## (undated) DEVICE — SPONGE COTTONOID 1/2X1 1/2" 20-06S

## (undated) DEVICE — BUR STRK 2.3MM TAPERED ROUTER - FA2 5407-FA2-023

## (undated) DEVICE — PAD CHUX UNDERPAD 23X24" 7136

## (undated) DEVICE — NDL 18GAX1.5" 305185

## (undated) DEVICE — ESU HOLSTER PLASTIC DISP E2400

## (undated) DEVICE — SU VICRYL 2-0 CT-2 CR 8X18" J726D

## (undated) DEVICE — PROTECTOR ARM ONE-STEP TRENDELENBURG 40418

## (undated) DEVICE — ANTIFOG SOLUTION W/FOAM PAD 31142527

## (undated) DEVICE — PREP DURAPREP 26ML APL 8630

## (undated) DEVICE — DRSG TEGADERM 4X4 3/4" 1626W

## (undated) DEVICE — SU SILK 2-0 TIE 12X30" A305H

## (undated) DEVICE — SPONGE SURGIFOAM 100 1974

## (undated) DEVICE — CUSA 36KHZ WRENCH TORQUE CLARITY C7602

## (undated) DEVICE — PREP SKIN SCRUB TRAY 4461A

## (undated) DEVICE — GLOVE PROTEXIS BLUE W/NEU-THERA 7.5  2D73EB75

## (undated) DEVICE — SUCTION MANIFOLD NEPTUNE 2 SYS 4 PORT 0702-020-000

## (undated) DEVICE — SU VICRYL 0 CT-1 CR 8X18" J740D

## (undated) DEVICE — ESU GROUND PAD ADULT W/CORD E7507

## (undated) DEVICE — SYR 10ML FINGER CONTROL W/O NDL 309695

## (undated) DEVICE — SPONGE SURGIFOAM 01GM POWDER 1978

## (undated) DEVICE — NDL BLUNT 18GA 1" W/O FILTER 305181

## (undated) DEVICE — SPONGE COTTONOID 1/2X1/2" 20-04S

## (undated) DEVICE — CLIP RANEY

## (undated) DEVICE — SYR 10ML LL W/O NDL 302995

## (undated) DEVICE — PREP POVIDONE IODINE SCRUB 7.5% 4OZ APL82212

## (undated) DEVICE — CATH TRAY FOLEY 16FR W/URINE METER STATLOCK 303316A

## (undated) DEVICE — DRAPE MAYO STAND 23X54 8337

## (undated) DEVICE — DRAPE SHEET REV FOLD 3/4 9349

## (undated) DEVICE — LINEN TOWEL PACK X5 5464

## (undated) DEVICE — TAPE MICROFOAM 4" 1528-4

## (undated) DEVICE — DRSG GAUZE 4X4" TRAY 6939

## (undated) DEVICE — PREP POVIDONE IODINE SOLUTION 10% 4OZ

## (undated) DEVICE — LABEL MEDICATION SYSTEM 3303-P

## (undated) DEVICE — BLADE KNIFE SURG 15 371115

## (undated) DEVICE — DECANTER BAG 2002S

## (undated) DEVICE — SHUNT BECKER EXTERNAL DRAIN 46128

## (undated) DEVICE — SOL NACL 0.9% 10ML VIAL 0409-4888-02

## (undated) DEVICE — PENCIL PRESHARPENED SOFT #2 1/PK  17701/50

## (undated) DEVICE — ESU CORD BIPOLAR GREEN 10-4000

## (undated) DEVICE — KIT ACCESSORY LUMBAR DRAIN 910121

## (undated) DEVICE — COVER CAMERA VIDEO LASER 9X96" 04-CC227

## (undated) DEVICE — LINEN TOWEL PACK X30 5481

## (undated) DEVICE — PIN SKULL MAYFIELD ADULT TITANIUM 3/PK A1120

## (undated) DEVICE — BONE WAX 2.5GM W31G

## (undated) DEVICE — SPONGE COTTONOID 1/4X1/4" 20-01S

## (undated) DEVICE — DRAPE XRAY CASSETTE 20X23

## (undated) DEVICE — SPONGE COTTONOID 1X3" 20-10S

## (undated) DEVICE — DRSG PRIMAPORE 03 1/8X6" 66000318

## (undated) DEVICE — DRAPE U SPLIT 74X120" 29440

## (undated) DEVICE — BLADE CLIPPER SGL USE 9680

## (undated) DEVICE — DRAPE STOCKINETTE IMPERVIOUS 10" 21048

## (undated) DEVICE — GLOVE PROTEXIS MICRO 7.5  2D73PM75

## (undated) DEVICE — SYR 30ML LL W/O NDL 302832

## (undated) DEVICE — ESU FCP CODMAN 9"X1.0MM VERSATRU 9009100SL

## (undated) DEVICE — SURGICEL HEMOSTAT 4X8" 1952

## (undated) DEVICE — BLADE KNIFE BEAVER MICROSHARP GREEN 377515

## (undated) RX ORDER — PROPOFOL 10 MG/ML
INJECTION, EMULSION INTRAVENOUS
Status: DISPENSED
Start: 2021-09-10

## (undated) RX ORDER — FENTANYL CITRATE 50 UG/ML
INJECTION, SOLUTION INTRAMUSCULAR; INTRAVENOUS
Status: DISPENSED
Start: 2021-09-10

## (undated) RX ORDER — CEFAZOLIN SODIUM 2 G/100ML
INJECTION, SOLUTION INTRAVENOUS
Status: DISPENSED
Start: 2021-09-10

## (undated) RX ORDER — BUPIVACAINE HYDROCHLORIDE AND EPINEPHRINE 5; 5 MG/ML; UG/ML
INJECTION, SOLUTION PERINEURAL
Status: DISPENSED
Start: 2021-09-10

## (undated) RX ORDER — HYDROMORPHONE HYDROCHLORIDE 1 MG/ML
INJECTION, SOLUTION INTRAMUSCULAR; INTRAVENOUS; SUBCUTANEOUS
Status: DISPENSED
Start: 2021-09-10

## (undated) RX ORDER — ONDANSETRON 2 MG/ML
INJECTION INTRAMUSCULAR; INTRAVENOUS
Status: DISPENSED
Start: 2021-09-10

## (undated) RX ORDER — ACETAMINOPHEN 325 MG/1
TABLET ORAL
Status: DISPENSED
Start: 2021-09-10

## (undated) RX ORDER — PAPAVERINE HYDROCHLORIDE 30 MG/ML
INJECTION INTRAMUSCULAR; INTRAVENOUS
Status: DISPENSED
Start: 2021-09-10

## (undated) RX ORDER — SODIUM CHLORIDE 9 MG/ML
INJECTION, SOLUTION INTRAVENOUS
Status: DISPENSED
Start: 2021-09-10